# Patient Record
Sex: FEMALE | Race: WHITE | NOT HISPANIC OR LATINO | Employment: FULL TIME | URBAN - METROPOLITAN AREA
[De-identification: names, ages, dates, MRNs, and addresses within clinical notes are randomized per-mention and may not be internally consistent; named-entity substitution may affect disease eponyms.]

---

## 2017-11-06 ENCOUNTER — OFFICE VISIT (OUTPATIENT)
Dept: BARIATRICS/WEIGHT MGMT | Facility: CLINIC | Age: 37
End: 2017-11-06

## 2017-11-06 ENCOUNTER — APPOINTMENT (OUTPATIENT)
Dept: LAB | Facility: HOSPITAL | Age: 37
End: 2017-11-06

## 2017-11-06 VITALS
BODY MASS INDEX: 27.99 KG/M2 | WEIGHT: 168 LBS | HEART RATE: 91 BPM | HEIGHT: 65 IN | SYSTOLIC BLOOD PRESSURE: 131 MMHG | TEMPERATURE: 98.5 F | DIASTOLIC BLOOD PRESSURE: 78 MMHG | RESPIRATION RATE: 16 BRPM

## 2017-11-06 DIAGNOSIS — E66.9 OBESITY, CLASS I, BMI 30-34.9: Primary | ICD-10-CM

## 2017-11-06 DIAGNOSIS — E55.9 VITAMIN D DEFICIENCY: ICD-10-CM

## 2017-11-06 DIAGNOSIS — Z71.3 DIETARY COUNSELING: ICD-10-CM

## 2017-11-06 PROCEDURE — 83013 H PYLORI (C-13) BREATH: CPT | Performed by: NURSE PRACTITIONER

## 2017-11-06 PROCEDURE — 99213 OFFICE O/P EST LOW 20 MIN: CPT | Performed by: NURSE PRACTITIONER

## 2017-11-06 NOTE — PROGRESS NOTES
[facility] BARIATRIC [facility] BARIATRIC SURGERY  [address]  [facility] [address]  [phone]  [address]  [facility] [id number] [phone]  Dept: [phone]  11/6/2017      Christy Castillo.  [id number]  [id number]  1980  female      Chief Complaint   Patient presents with   • Follow-up     1.5 year followup RNMEREDITH RAMIREZ Post-Op Bariatric Surgery:   Christy Castillo is status post Laparoscopic Bypass revision procedure, performed on 5/2/16     HPI:   Today's weight is 168 lb (76.2 kg) pounds, today's BMI is Body mass index is 27.96 kg/(m^2)., she has a  loss of 12 pounds since the last visit and her weight loss since surgery is 83 pounds. The patient reports a decreased portion size and loss of appetite.      Christy Castillo denies nausea, vomiting, dysphagia, abdominal pain or heartburn.     Diet and Exercise: Diet history reviewed and discussed with the patient. Weight loss/gains to date discussed with the patient. The patient states they are eating 60-80 grams of protein per day. She reports eating 3 meals per day, a typical portion size of 1 cup, eating 2 snacks per day, drinking 5 or more 8-oz. glasses of water per day, no carbonated beverage consumption and exercising regularly- yoga once per week    Reports right sided abdominal pain that was waking her from sleep.    Supplements: [employer] PATCH with elemental iron since about 1 mo ago.     Review of Systems   Constitutional: Positive for appetite change. Negative for fatigue and unexpected weight change.   HENT: Negative.    Eyes: Negative.    Respiratory: Negative.    Cardiovascular: Negative.  Negative for leg swelling.   Gastrointestinal: Positive for vomiting (occaionally if she eats too quickly). Negative for abdominal distention, abdominal pain, constipation, diarrhea and nausea.   Genitourinary: Negative for difficulty urinating, frequency and urgency.   Musculoskeletal: Negative for back pain.    Skin: Negative.    Psychiatric/Behavioral: Negative.    All other systems reviewed and are negative.      Patient Active Problem List   Diagnosis   • Dietary counseling   • Edema   • Pain in joint   • Obesity, Class I, BMI 30-34.9   • Vitamin D deficiency   • Pseudotumor cerebri syndrome   • History of hypertension   • Alopecia       Past Medical History:   Diagnosis Date   • Arthritis    • Edema    • Fatigue    • Gastric band slippage     RESOLVED   • Hypertension    • Joint pain    • Joint pain, knee    • Obesity, morbid    • Plantar fasciitis     HISTORY   • PONV (postoperative nausea and vomiting)     SEVERE N/V WITH GAS WITH GASTRIC BANDING   • Pseudotumor cerebri    • Urge and stress incontinence        The following portions of the patient's history were reviewed and updated as appropriate: allergies, current medications, past family history, past medical history, past social history, past surgical history and problem list.    Vitals:    11/06/17 1616   BP: 131/78   Pulse: 91   Resp: 16   Temp: 98.5 °F (36.9 °C)       Physical Exam   Constitutional: She appears well-developed and well-nourished.   Neck: No thyromegaly present.   Cardiovascular: Normal rate, regular rhythm and normal heart sounds.    Pulmonary/Chest: Effort normal and breath sounds normal. No respiratory distress. She has no wheezes.   Abdominal: Soft. Bowel sounds are normal. She exhibits no distension. There is no tenderness. There is no guarding. No hernia.   Musculoskeletal: She exhibits no edema or tenderness.   Neurological: She is alert.   Skin: Skin is warm and dry. No rash noted. No erythema.   Psychiatric: She has a normal mood and affect. Her behavior is normal.   Nursing note and vitals reviewed.        Assessment:   Post-op, the patient is doing well.     Encounter Diagnoses   Name Primary?   • Obesity, Class I, BMI 30-34.9 Yes   • Dietary counseling    • Vitamin D deficiency        Plan:     Encouraged patient to be sure to get  plenty of lean protein per day through small frequent meals all with a protein source.   Activity restrictions: none.   Recommended patient be sure to get at least 70 grams of protein per day by eating small, frequent meals all with high lean protein choices. Be sure to limit/cut back on daily carbohydrate intake. Discussed with the patient the recommended amount of water per day to intake- half of body weight in ounces. Reviewed vitamin requirements. Be sure to do routine exercise, 150 minutes per week minimum, including both cardio and strength training.     Instructions / Recommendations: dietary counseling recommended, recommended a daily protein intake of  grams, vitamin supplement(s) recommended, recommended exercising at least 150 minutes per week, behavior modifications recommended and instructed to call the office for concerns, questions, or problems.     The patient was instructed to follow up in 6 months .     The patient was counseled regarding lab work, follow up, exercise, protein intake, fluid intake. Total time spent face to face was 20 minutes and 15 minutes was spent counseling.

## 2017-11-09 LAB — UREA BREATH TEST QL: NEGATIVE

## 2018-02-07 ENCOUNTER — APPOINTMENT (OUTPATIENT)
Dept: CT IMAGING | Facility: HOSPITAL | Age: 38
End: 2018-02-07

## 2018-02-07 ENCOUNTER — HOSPITAL ENCOUNTER (EMERGENCY)
Facility: HOSPITAL | Age: 38
Discharge: HOME | End: 2018-02-08
Attending: EMERGENCY MEDICINE | Admitting: EMERGENCY MEDICINE

## 2018-02-07 DIAGNOSIS — K80.50 BILIARY COLIC: Primary | ICD-10-CM

## 2018-02-07 LAB
ALBUMIN SERPL-MCNC: 4.1 G/DL (ref 3.5–5.2)
ALBUMIN/GLOB SERPL: 1.4 G/DL
ALP SERPL-CCNC: 65 U/L (ref 39–117)
ALT SERPL W P-5'-P-CCNC: 12 U/L (ref 1–33)
ANION GAP SERPL CALCULATED.3IONS-SCNC: 10 MMOL/L
AST SERPL-CCNC: 16 U/L (ref 1–32)
BACTERIA UR QL AUTO: ABNORMAL /HPF
BASOPHILS # BLD AUTO: 0.02 10*3/MM3 (ref 0–0.2)
BASOPHILS NFR BLD AUTO: 0.2 % (ref 0–1.5)
BILIRUB SERPL-MCNC: 0.2 MG/DL (ref 0.1–1.2)
BILIRUB UR QL STRIP: NEGATIVE
BUN BLD-MCNC: 15 MG/DL (ref 6–20)
BUN/CREAT SERPL: 20.5 (ref 7–25)
CALCIUM SPEC-SCNC: 8.8 MG/DL (ref 8.6–10.5)
CHLORIDE SERPL-SCNC: 104 MMOL/L (ref 98–107)
CLARITY UR: CLEAR
CO2 SERPL-SCNC: 28 MMOL/L (ref 22–29)
COLOR UR: YELLOW
CREAT BLD-MCNC: 0.73 MG/DL (ref 0.57–1)
DEPRECATED RDW RBC AUTO: 60.1 FL (ref 37–54)
EOSINOPHIL # BLD AUTO: 0.11 10*3/MM3 (ref 0–0.7)
EOSINOPHIL NFR BLD AUTO: 1.2 % (ref 0.3–6.2)
ERYTHROCYTE [DISTWIDTH] IN BLOOD BY AUTOMATED COUNT: 17.8 % (ref 11.7–13)
GFR SERPL CREATININE-BSD FRML MDRD: 89 ML/MIN/1.73
GLOBULIN UR ELPH-MCNC: 2.9 GM/DL
GLUCOSE BLD-MCNC: 89 MG/DL (ref 65–99)
GLUCOSE UR STRIP-MCNC: NEGATIVE MG/DL
HCG SERPL QL: NEGATIVE
HCT VFR BLD AUTO: 39.7 % (ref 35.6–45.5)
HGB BLD-MCNC: 12.5 G/DL (ref 11.9–15.5)
HGB UR QL STRIP.AUTO: ABNORMAL
HOLD SPECIMEN: NORMAL
HOLD SPECIMEN: NORMAL
HYALINE CASTS UR QL AUTO: ABNORMAL /LPF
IMM GRANULOCYTES # BLD: 0 10*3/MM3 (ref 0–0.03)
IMM GRANULOCYTES NFR BLD: 0 % (ref 0–0.5)
KETONES UR QL STRIP: NEGATIVE
LEUKOCYTE ESTERASE UR QL STRIP.AUTO: NEGATIVE
LIPASE SERPL-CCNC: 28 U/L (ref 13–60)
LYMPHOCYTES # BLD AUTO: 2.39 10*3/MM3 (ref 0.9–4.8)
LYMPHOCYTES NFR BLD AUTO: 27 % (ref 19.6–45.3)
MCH RBC QN AUTO: 28.3 PG (ref 26.9–32)
MCHC RBC AUTO-ENTMCNC: 31.5 G/DL (ref 32.4–36.3)
MCV RBC AUTO: 89.8 FL (ref 80.5–98.2)
MONOCYTES # BLD AUTO: 0.68 10*3/MM3 (ref 0.2–1.2)
MONOCYTES NFR BLD AUTO: 7.7 % (ref 5–12)
NEUTROPHILS # BLD AUTO: 5.66 10*3/MM3 (ref 1.9–8.1)
NEUTROPHILS NFR BLD AUTO: 63.9 % (ref 42.7–76)
NITRITE UR QL STRIP: NEGATIVE
PH UR STRIP.AUTO: 6 [PH] (ref 5–8)
PLATELET # BLD AUTO: 235 10*3/MM3 (ref 140–500)
PMV BLD AUTO: 9.8 FL (ref 6–12)
POTASSIUM BLD-SCNC: 4.2 MMOL/L (ref 3.5–5.2)
PROT SERPL-MCNC: 7 G/DL (ref 6–8.5)
PROT UR QL STRIP: NEGATIVE
RBC # BLD AUTO: 4.42 10*6/MM3 (ref 3.9–5.2)
RBC # UR: ABNORMAL /HPF
REF LAB TEST METHOD: ABNORMAL
SODIUM BLD-SCNC: 142 MMOL/L (ref 136–145)
SP GR UR STRIP: 1.02 (ref 1–1.03)
SQUAMOUS #/AREA URNS HPF: ABNORMAL /HPF
UROBILINOGEN UR QL STRIP: ABNORMAL
WBC NRBC COR # BLD: 8.86 10*3/MM3 (ref 4.5–10.7)
WBC UR QL AUTO: ABNORMAL /HPF
WHOLE BLOOD HOLD SPECIMEN: NORMAL
WHOLE BLOOD HOLD SPECIMEN: NORMAL

## 2018-02-07 PROCEDURE — 99284 EMERGENCY DEPT VISIT MOD MDM: CPT

## 2018-02-07 PROCEDURE — 80053 COMPREHEN METABOLIC PANEL: CPT | Performed by: EMERGENCY MEDICINE

## 2018-02-07 PROCEDURE — 81003 URINALYSIS AUTO W/O SCOPE: CPT | Performed by: EMERGENCY MEDICINE

## 2018-02-07 PROCEDURE — 36415 COLL VENOUS BLD VENIPUNCTURE: CPT | Performed by: EMERGENCY MEDICINE

## 2018-02-07 PROCEDURE — 84703 CHORIONIC GONADOTROPIN ASSAY: CPT | Performed by: EMERGENCY MEDICINE

## 2018-02-07 PROCEDURE — 83690 ASSAY OF LIPASE: CPT | Performed by: EMERGENCY MEDICINE

## 2018-02-07 PROCEDURE — 81015 MICROSCOPIC EXAM OF URINE: CPT | Performed by: EMERGENCY MEDICINE

## 2018-02-07 PROCEDURE — 85025 COMPLETE CBC W/AUTO DIFF WBC: CPT | Performed by: EMERGENCY MEDICINE

## 2018-02-07 RX ORDER — SODIUM CHLORIDE 0.9 % (FLUSH) 0.9 %
10 SYRINGE (ML) INJECTION AS NEEDED
Status: DISCONTINUED | OUTPATIENT
Start: 2018-02-07 | End: 2018-02-08 | Stop reason: HOSPADM

## 2018-02-08 ENCOUNTER — APPOINTMENT (OUTPATIENT)
Dept: CT IMAGING | Facility: HOSPITAL | Age: 38
End: 2018-02-08

## 2018-02-08 VITALS
WEIGHT: 175 LBS | RESPIRATION RATE: 16 BRPM | DIASTOLIC BLOOD PRESSURE: 81 MMHG | OXYGEN SATURATION: 97 % | TEMPERATURE: 98.4 F | HEART RATE: 53 BPM | SYSTOLIC BLOOD PRESSURE: 117 MMHG | BODY MASS INDEX: 29.16 KG/M2 | HEIGHT: 65 IN

## 2018-02-08 PROCEDURE — 0 IOPAMIDOL 61 % SOLUTION: Performed by: EMERGENCY MEDICINE

## 2018-02-08 PROCEDURE — 74177 CT ABD & PELVIS W/CONTRAST: CPT

## 2018-02-08 RX ORDER — DICYCLOMINE HCL 20 MG
20 TABLET ORAL EVERY 6 HOURS PRN
Qty: 30 TABLET | Refills: 0 | Status: SHIPPED | OUTPATIENT
Start: 2018-02-08 | End: 2022-02-11

## 2018-02-08 RX ORDER — ONDANSETRON 4 MG/1
4 TABLET, ORALLY DISINTEGRATING ORAL EVERY 8 HOURS PRN
Qty: 15 TABLET | Refills: 0 | Status: SHIPPED | OUTPATIENT
Start: 2018-02-08 | End: 2022-02-11

## 2018-02-08 RX ADMIN — IOPAMIDOL 85 ML: 612 INJECTION, SOLUTION INTRAVENOUS at 00:53

## 2018-02-08 NOTE — ED NOTES
Pt c/o RUQ pain. Hx of gastric bypass in 2016 and gall stones. Pain began last night after she ate and again tonight after she ate with vomiting. Denies nausea or diarrhea. Feels better after vomiting.      Cinda Agudelo RN  02/07/18 1953

## 2018-02-08 NOTE — ED PROVIDER NOTES
EMERGENCY DEPARTMENT ENCOUNTER    CHIEF COMPLAINT  Chief Complaint: RUQ abd pain   History given by: Patient  History limited by:  N/A  Room Number: 34/34  PMD: ROYCE Doe      HPI:  Pt is a 37 y.o. female who presents complaining of RUQ pain, which began a couple days ago, but worsened tonight after eating dinner.  Pt also c/o vomiting bile, and constipation, but denies bowel problem or fever.  Pt reports a hx of gastric bypass surgery performed by Dr. Enrique (surgeon) in 2016, losing 100 lbs.     Duration:  Couple days, worsened tonight  Onset: gradual  Timing: waxing and waning, now constant  Location: RUQ abd  Radiation: none  Quality: pain  Intensity/Severity: moderate  Progression: unchanged  Associated Symptoms: vomiting bile, constipation  Aggravating Factors: eating  Alleviating Factors: none  Previous Episodes: none  Treatment before arrival: none    PAST MEDICAL HISTORY  Active Ambulatory Problems     Diagnosis Date Noted   • Dietary counseling 02/15/2016   • Edema 04/14/2016   • Pain in joint 04/14/2016   • Obesity, Class I, BMI 30-34.9 07/27/2016   • Vitamin D deficiency 07/27/2016   • Pseudotumor cerebri syndrome 03/25/2015   • History of hypertension 10/26/2016   • Alopecia 10/26/2016     Resolved Ambulatory Problems     Diagnosis Date Noted   • Morbid obesity with BMI of 40.0-44.9, adult 02/15/2016   • Essential hypertension 02/15/2016   • Fatigue 04/14/2016   • Heartburn 04/14/2016   • Post-op pain 05/05/2016   • Obesity, Class II, BMI 35-39.9 06/09/2016     Past Medical History:   Diagnosis Date   • Arthritis    • Edema    • Fatigue    • Gall stones    • Gastric band slippage    • Hypertension    • Joint pain    • Joint pain, knee    • Obesity, morbid    • Plantar fasciitis    • PONV (postoperative nausea and vomiting)    • Pseudotumor cerebri    • Urge and stress incontinence        PAST SURGICAL HISTORY  Past Surgical History:   Procedure Laterality Date   • COLONOSCOPY       Fiberoptic   • GASTRIC BANDING REMOVAL      Laparosc Restrictive Proc Adjustable Gastric Band Removal   • LAPAROSCOPIC GASTRIC BANDING      Laparosc Restrictive Proc Adjustable Gastric Band Placement   • CA LAP GASTRIC BYPASS/ANCA-EN-Y N/A 5/2/2016    Procedure: REVISION GASTRIC BYPASS LAPAROSCOPIC , PREVIOUS LAP BAND,WITH LYSIS OF ADHESIONS;  Surgeon: Quinn Enrique Jr., MD;  [place] [facility] [place]  Service: [place]       FAMILY HISTORY  Family History   Problem Relation Age of Onset   • Heart disease Other    • Hypertension Other    • Heart attack Other    • Sleep apnea Other    • Obesity Other      Morbid       SOCIAL HISTORY  Social History     Social History   • Marital status:      Spouse name: N/A   • Number of children: N/A   • Years of education: N/A     Occupational History   • Not on file.     Social History Main Topics   • Smoking status: Never Smoker   • Smokeless tobacco: Never Used   • Alcohol use Yes      Comment: Occasional use   • Drug use: No   • Sexual activity: Not on file     Other Topics Concern   • Not on file     Social History Narrative   • No narrative on file       ALLERGIES  Review of patient's allergies indicates no known allergies.    REVIEW OF SYSTEMS  Review of Systems   Constitutional: Negative for fever.   HENT: Negative for sore throat.    Eyes: Negative.    Respiratory: Negative for cough and shortness of breath.    Cardiovascular: Negative for chest pain.   Gastrointestinal: Positive for abdominal pain (RUQ), constipation and vomiting. Negative for diarrhea.   Genitourinary: Negative for dysuria.   Musculoskeletal: Negative for neck pain.   Skin: Negative for rash.   Allergic/Immunologic: Negative.    Neurological: Negative for weakness, numbness and headaches.   Hematological: Negative.    Psychiatric/Behavioral: Negative.    All other systems reviewed and are negative.      PHYSICAL EXAM  ED Triage Vitals   Temp Heart Rate Resp BP SpO2   02/07/18 1943 02/07/18  1943 02/07/18 1943 02/07/18 1953 02/07/18 1943   97.9 °F (36.6 °C) 59 16 158/91 100 %      Temp src Heart Rate Source Patient Position BP Location FiO2 (%)   02/07/18 1943 02/07/18 1943 02/07/18 2314 02/07/18 2314 --   Tympanic Monitor Lying Right arm        Physical Exam   Constitutional: She is oriented to person, place, and time and well-developed, well-nourished, and in no distress. No distress.   HENT:   Head: Normocephalic and atraumatic.   Eyes: EOM are normal. Pupils are equal, round, and reactive to light.   Neck: Normal range of motion. Neck supple.   Cardiovascular: Normal rate, regular rhythm and normal heart sounds.    Pulmonary/Chest: Effort normal and breath sounds normal. No respiratory distress.   Abdominal: Soft. There is no tenderness. There is no rebound and no guarding.   Musculoskeletal: Normal range of motion. She exhibits no edema.   Neurological: She is alert and oriented to person, place, and time. She has normal sensation and normal strength.   Skin: Skin is warm and dry. No rash noted.   Psychiatric: Mood and affect normal.   Nursing note and vitals reviewed.      LAB RESULTS  Lab Results (last 24 hours)     Procedure Component Value Units Date/Time    CBC & Differential [[id number]] Collected:  02/07/18 2001    Specimen:  Blood Updated:  02/07/18 2016    Narrative:       The following orders were created for panel order CBC & Differential.  Procedure                               Abnormality         Status                     ---------                               -----------         ------                     CBC Auto Differential[[id number]]        Abnormal            Final result                 Please view results for these tests on the individual orders.    Comprehensive Metabolic Panel [[id number]] Collected:  02/07/18 2001    Specimen:  Blood Updated:  02/07/18 2037     Glucose 89 mg/dL      BUN 15 mg/dL      Creatinine 0.73 mg/dL      Sodium 142 mmol/L      Potassium 4.2 mmol/L       Chloride 104 mmol/L      CO2 28.0 mmol/L      Calcium 8.8 mg/dL      Total Protein 7.0 g/dL      Albumin 4.10 g/dL      ALT (SGPT) 12 U/L      AST (SGOT) 16 U/L      Alkaline Phosphatase 65 U/L      Total Bilirubin 0.2 mg/dL      eGFR Non African Amer 89 mL/min/1.73      Globulin 2.9 gm/dL      A/G Ratio 1.4 g/dL      BUN/Creatinine Ratio 20.5     Anion Gap 10.0 mmol/L     Lipase [[id number]]  (Normal) Collected:  02/07/18 2001    Specimen:  Blood Updated:  02/07/18 2037     Lipase 28 U/L     hCG, Serum, Qualitative [[id number]]  (Normal) Collected:  02/07/18 2001    Specimen:  Blood Updated:  02/07/18 2046     HCG Qualitative Negative    CBC Auto Differential [[id number]]  (Abnormal) Collected:  02/07/18 2001    Specimen:  Blood Updated:  02/07/18 2016     WBC 8.86 10*3/mm3      RBC 4.42 10*6/mm3      Hemoglobin 12.5 g/dL      Hematocrit 39.7 %      MCV 89.8 fL      MCH 28.3 pg      MCHC 31.5 (L) g/dL      RDW 17.8 (H) %      RDW-SD 60.1 (H) fl      MPV 9.8 fL      Platelets 235 10*3/mm3      Neutrophil % 63.9 %      Lymphocyte % 27.0 %      Monocyte % 7.7 %      Eosinophil % 1.2 %      Basophil % 0.2 %      Immature Grans % 0.0 %      Neutrophils, Absolute 5.66 10*3/mm3      Lymphocytes, Absolute 2.39 10*3/mm3      Monocytes, Absolute 0.68 10*3/mm3      Eosinophils, Absolute 0.11 10*3/mm3      Basophils, Absolute 0.02 10*3/mm3      Immature Grans, Absolute 0.00 10*3/mm3     Urinalysis With / Culture If Indicated - Urine, Clean Catch [[id number]]  (Abnormal) Collected:  02/07/18 2008    Specimen:  Urine from Urine, Clean Catch Updated:  02/07/18 2025     Color, UA Yellow     Appearance, UA Clear     pH, UA 6.0     Specific Gravity, UA 1.020     Glucose, UA Negative     Ketones, UA Negative     Bilirubin, UA Negative     Blood, UA Moderate (2+) (A)     Protein, UA Negative     Leuk Esterase, UA Negative     Nitrite, UA Negative     Urobilinogen, UA 0.2 E.U./dL    Urinalysis, Microscopic Only - Urine, Clean Catch  [[id number]]  (Abnormal) Collected:  02/07/18 2008    Specimen:  Urine from Urine, Clean Catch Updated:  02/07/18 2038     RBC, UA 6-12 (A) /HPF      WBC, UA 0-2 /HPF      Bacteria, UA None Seen /HPF      Squamous Epithelial Cells, UA 0-2 /HPF      Hyaline Casts, UA 0-2 /LPF      Methodology Manual Light Microscopy          I ordered the above labs and reviewed the results    RADIOLOGY  CT Abdomen Pelvis With Contrast   Final Result   IMPRESSION :    1. Minimal diverticulosis, Severe constipation without obstruction.   2. Tiny nonenhancing right renal lesions, likely cysts.           This report was finalized on 2/8/2018 1:02 AM by Darinel Walls MD.          NM HIDA scan with pharmacological intervention    (Results Pending)        I ordered the above noted radiological studies. Interpreted by radiologist. Reviewed by me in PACS.       PROCEDURES  Procedures      PROGRESS AND CONSULTS  ED Course     0146  Discussed normal lab results and CT results showing no presence of obstruction from the gastric bypass surgery.  Discussed plan for discharge to get an outpatient HIDA scan and f/u with Dr. Enrique (surgeon) to further evaluate her gall bladder.  Pt understands and agrees with the plan, all questions answered.      MEDICAL DECISION MAKING  Results were reviewed/discussed with the patient and they were also made aware of online access. Pt also made aware that some labs, such as cultures, will not be resulted during ER visit and follow up with PMD is necessary.     MDM  Number of Diagnoses or Management Options  Biliary colic:      Amount and/or Complexity of Data Reviewed  Clinical lab tests: ordered and reviewed (UA: moderate blood.  Lipase: 28.   CBC: WBC 8.86.)  Tests in the radiology section of CPT®: ordered and reviewed (CT Abd/Pelvis shows minimal diverticulosis, Severe constipation without obstruction.)    Patient Progress  Patient progress: stable         DIAGNOSIS  Final diagnoses:   Biliary colic        DISPOSITION  DISCHARGE    Patient discharged in stable condition.    Reviewed implications of results, diagnosis, meds, responsibility to follow up, warning signs and symptoms of possible worsening, potential complications and reasons to return to ER.    Patient/Family voiced understanding of above instructions.    Discussed plan for discharge, as there is no emergent indication for admission.  Pt/family is agreeable and understands need for follow up and repeat testing.  Pt is aware that discharge does not mean that nothing is wrong but it indicates no emergency is present that requires admission and they must continue care with follow-up as given below or physician of their choice.     FOLLOW-UP  Juju Yuan, APRN  [address]  [place] [address]  [phone]               Medication List      New Prescriptions          dicyclomine 20 MG tablet   Commonly known as:  BENTYL   Take 1 tablet by mouth Every 6 (Six) Hours As Needed (cramping).       ondansetron ODT 4 MG disintegrating tablet   Commonly known as:  ZOFRAN-ODT   Take 1 tablet by mouth Every 8 (Eight) Hours As Needed for Nausea or   Vomiting.               Latest Documented Vital Signs:  As of 2:03 AM  BP- 118/84 HR- 53 Temp- 97.9 °F (36.6 °C) (Tympanic) O2 sat- 99%    --  Documentation assistance provided by deanne Astorga for Dr. Siegel.  Information recorded by the deanne was done at my direction and has been verified and validated by me.     Danae Astorga  02/08/18 0206       Hilton Siegel MD  02/08/18 0212

## 2018-02-09 ENCOUNTER — APPOINTMENT (OUTPATIENT)
Dept: NUCLEAR MEDICINE | Facility: HOSPITAL | Age: 38
End: 2018-02-09
Attending: EMERGENCY MEDICINE

## 2022-02-15 ENCOUNTER — OFFICE VISIT (OUTPATIENT)
Dept: PODIATRY | Facility: CLINIC | Age: 42
End: 2022-02-15

## 2022-02-15 VITALS
WEIGHT: 210 LBS | DIASTOLIC BLOOD PRESSURE: 93 MMHG | HEART RATE: 60 BPM | HEIGHT: 65 IN | BODY MASS INDEX: 34.99 KG/M2 | SYSTOLIC BLOOD PRESSURE: 134 MMHG

## 2022-02-15 DIAGNOSIS — M79.671 RIGHT FOOT PAIN: Primary | ICD-10-CM

## 2022-02-15 DIAGNOSIS — S92.351A CLOSED DISPLACED FRACTURE OF FIFTH METATARSAL BONE OF RIGHT FOOT, INITIAL ENCOUNTER: ICD-10-CM

## 2022-02-15 PROCEDURE — 99204 OFFICE O/P NEW MOD 45 MIN: CPT | Performed by: PODIATRIST

## 2022-02-15 NOTE — PROGRESS NOTES
02/15/2022  Foot and Ankle Surgery - New Patient   Provider: Dr. Juan Nesbitt DPM  Location: [facility]    Subjective:  Christy Castillo is a 41 y.o. female.     Chief Complaint   Patient presents with   • Right Foot - Pain   • Establish Care     No pcp per patient        HPI: Patient is a 41-year-old female that presents after recent injury involving her right foot.  Patient accidentally kicked an object causing pain to the foot.  She reported to the urgent care center after the injury.  Imaging was performed showing fifth metatarsal fracture.  She was placed into a cam boot and referred to me for management.  Today, she continues to have pain and swelling.  She has noted bruising to the plantar aspect of the foot.  She denies any previous injuries.    No Known Allergies    Past Medical History:   Diagnosis Date   • Arthritis    • Edema    • Fatigue    • Gall stones    • Gastric band slippage     RESOLVED   • Hypertension    • Joint pain    • Joint pain, knee    • Obesity, morbid (HCC)    • Plantar fasciitis     HISTORY   • PONV (postoperative nausea and vomiting)     SEVERE N/V WITH GAS WITH GASTRIC BANDING   • Pseudotumor cerebri    • Urge and stress incontinence        Past Surgical History:   Procedure Laterality Date   • COLONOSCOPY      Fiberoptic   • GASTRIC BANDING REMOVAL      Laparosc Restrictive Proc Adjustable Gastric Band Removal   • LAPAROSCOPIC GASTRIC BANDING      Laparosc Restrictive Proc Adjustable Gastric Band Placement   • [place] LAP GASTRIC BYPASS/ANCA-EN-Y N/A 5/2/2016    Procedure: REVISION GASTRIC BYPASS LAPAROSCOPIC , PREVIOUS LAP BAND,WITH LYSIS OF ADHESIONS;  Surgeon: Quinn Enrique Jr., MD;  [place] [facility] [place]  Service: General       Family History   Problem Relation Age of Onset   • Heart disease Other    • Hypertension Other    • Heart attack Other    • Sleep apnea Other    • Obesity Other         Morbid       Social History     Socioeconomic History   • Marital  "status:    Tobacco Use   • Smoking status: Never Smoker   • Smokeless tobacco: Never Used   Vaping Use   • Vaping Use: Never used   Substance and Sexual Activity   • Alcohol use: Yes     Comment: Occasional use   • Drug use: No   • Sexual activity: Defer        Current Outpatient Medications on File Prior to Visit   Medication Sig Dispense Refill   • Multiple Vitamins-Minerals (MULTIVITAMIN ADULT PO) Take  by mouth Daily.     • omeprazole (priLOSEC) 20 MG capsule Take 20 mg by mouth Daily.       No current facility-administered medications on file prior to visit.       Review of Systems:  General: Denies fever, chills, fatigue, and weakness.  Eyes: Denies vision loss, blurry vision, and excessive redness.  ENT: Denies hearing issues and difficulty swallowing.  Cardiovascular: Denies palpitations, chest pain, or syncopal episodes.  Respiratory: Denies shortness of breath, wheezing, and coughing.  GI: Denies abdominal pain, nausea, and vomiting.   : Denies frequency, hematuria, and urgency.  Musculoskeletal: + Right foot pain  Derm: Denies rash, open wounds, or suspicious lesions.  Neuro: Denies headaches, numbness, loss of coordination, and tremors.  Psych: Denies anxiety and depression.  Endocrine: Denies temperature intolerance and changes in appetite.  Heme: Denies bleeding disorders or abnormal bruising.     Objective   /93   Pulse 60   Ht 165.1 cm (65\")   Wt 95.3 kg (210 lb)   BMI 34.95 kg/m²     Foot/Ankle Exam:       General:   Appearance: appears stated age and healthy    Orientation: AAOx3    Affect: appropriate    Gait: antalgic      VASCULAR      Right Foot Vascularity   Normal vascular exam    Dorsalis pedis:  2+  Posterior tibial:  2+  Skin Temperature: warm    Edema Grading:  None  CFT:  < 3 seconds  Pedal Hair Growth:  Present  Varicosities: none        NEUROLOGIC     Right Foot Neurologic   Light touch sensation:  Normal  Hot/Cold sensation: normal    Achilles reflex:  2+     " MUSCULOSKELETAL      Right Foot Musculoskeletal   Ecchymosis:  Plantar fascia and dorsal foot  Tenderness: 5th MTPJ    Arch:  Normal     DERMATOLOGIC     Right Foot Dermatologic   Skin: skin intact        Right Foot Additional Comments Instability, muscle strength, and range of motion testing deferred secondary to guarding.  No ankle or proximal leg pain.  No deformity noted.      Assessment/Plan   Diagnoses and all orders for this visit:    1. Right foot pain (Primary)    2. Closed displaced fracture of fifth metatarsal bone of right foot, initial encounter  -     COVID PRE-OP / PRE-PROCEDURE SCREENING ORDER (NO ISOLATION) - Swab, Nasopharynx; Future  -     CBC (No Diff); Future  -     Basic Metabolic Panel; Future  -     ECG 12 Lead; Future  -     XR Chest 2 View; Future  -     Case Request; Standing  -     Case Request    Other orders  -     Follow Anesthesia Guidelines / Standing Orders; Future  -     Obtain Informed Consent; Future  -     Provide NPO Instructions to Patient; Future  -     Chlorhexidine Skin Prep; Future      Patient presents after recent injury involving her right foot.  Imaging was independently reviewed showing displaced fracture involving the distal shaft of the fifth metatarsal.  I discussed the imaging, diagnosis, and treatment options at length.  We reviewed operative and nonoperative management for this fracture.  I have recommended that we proceed with open reduction internal fixation given the fracture gapping, displacement, and patient's activity level.  I do feel that surgery will allow for more predictable recovery.  We did discuss procedure, risk, goals, and recovery at length.  Patient understands and agrees.  We will plan for the near future.  I have asked that she remain mostly off weightbearing at this time.  She is to call with any additional issues or concerns.  Greater than 45 minutes was spent before, during, and after evaluation for patient care.    Orders Placed This  Encounter   Procedures   • COVID PRE-OP / PRE-PROCEDURE SCREENING ORDER (NO ISOLATION) - Swab, Nasopharynx     Standing Status:   Future     Standing Expiration Date:   2/15/2023     Order Specific Question:   Please select your location:     Answer:    [place]     Order Specific Question:   COVID Screening Order:     Answer:   In-House: EMERGENT/PREOP-CEPHEID, 3-4 HR TAT [[id number]]     Order Specific Question:   Employed in healthcare setting?     Answer:   No     Order Specific Question:   Symptomatic for COVID-19 as defined by CDC?     Answer:   No     Order Specific Question:   Hospitalized for COVID-19?     Answer:   No     Order Specific Question:   Admitted to ICU for COVID-19?     Answer:   No     Order Specific Question:   Resident in a congregate (group) care setting?     Answer:   No     Order Specific Question:   Pregnant?     Answer:   Unknown     Order Specific Question:   Release to patient     Answer:   Immediate   • XR Chest 2 View     Standing Status:   Future     Standing Expiration Date:   2/15/2023     Order Specific Question:   Reason for Exam:     Answer:   Preop     Order Specific Question:   Patient Pregnant     Answer:   Unknown   • CBC (No Diff)     Standing Status:   Future     Standing Expiration Date:   2/15/2023     Order Specific Question:   Release to patient     Answer:   Immediate   • Basic Metabolic Panel     Standing Status:   Future     Standing Expiration Date:   2/15/2023     Order Specific Question:   Release to patient     Answer:   Immediate   • Follow Anesthesia Guidelines / Standing Orders     Standing Status:   Future   • Obtain Informed Consent     Standing Status:   Future     Order Specific Question:   Informed Consent Given For     Answer:   Open reduction internal fixation of right fifth metatarsal fracture.   • Provide NPO Instructions to Patient     Standing Status:   Future   • Chlorhexidine Skin Prep     Chlorhexidine Skin Prep and Instructions For All Patients  Having A Procedure Requiring an Outward Incision if Not Allergic. If Allergic, Give Antibacterial Skin Wipes and Instructions. Do Not Use For Facial Cases or on Any Mucus Membranes.     Standing Status:   Future   • ECG 12 Lead     Standing Status:   Future     Standing Expiration Date:   2/15/2023     Order Specific Question:   Reason for Exam:     Answer:   Preop        Note is dictated utilizing voice recognition software. Unfortunately this leads to occasional typographical errors. I apologize in advance if the situation occurs. If questions occur please do not hesitate to call our office.

## 2022-02-17 PROBLEM — S92.351A CLOSED DISPLACED FRACTURE OF FIFTH METATARSAL BONE OF RIGHT FOOT: Status: ACTIVE | Noted: 2022-02-17

## 2022-02-18 ENCOUNTER — PATIENT ROUNDING (BHMG ONLY) (OUTPATIENT)
Dept: PODIATRY | Facility: CLINIC | Age: 42
End: 2022-02-18

## 2022-02-18 NOTE — PROGRESS NOTES
A my chart message has been sent to the patient for [place] [facility] [place]

## 2022-02-21 ENCOUNTER — HOSPITAL ENCOUNTER (OUTPATIENT)
Dept: GENERAL RADIOLOGY | Facility: HOSPITAL | Age: 42
Discharge: HOME | End: 2022-02-21

## 2022-02-21 ENCOUNTER — LAB (OUTPATIENT)
Dept: LAB | Facility: HOSPITAL | Age: 42
End: 2022-02-21

## 2022-02-21 ENCOUNTER — HOSPITAL ENCOUNTER (OUTPATIENT)
Dept: CARDIOLOGY | Facility: HOSPITAL | Age: 42
Discharge: HOME | End: 2022-02-21

## 2022-02-21 DIAGNOSIS — S92.351A CLOSED DISPLACED FRACTURE OF FIFTH METATARSAL BONE OF RIGHT FOOT, INITIAL ENCOUNTER: ICD-10-CM

## 2022-02-21 LAB
ANION GAP SERPL CALCULATED.3IONS-SCNC: 10.3 MMOL/L (ref 5–15)
BUN SERPL-MCNC: 9 MG/DL (ref 6–20)
BUN/CREAT SERPL: 13.4 (ref 7–25)
CALCIUM SPEC-SCNC: 9.2 MG/DL (ref 8.6–10.5)
CHLORIDE SERPL-SCNC: 100 MMOL/L (ref 98–107)
CO2 SERPL-SCNC: 27.7 MMOL/L (ref 22–29)
CREAT SERPL-MCNC: 0.67 MG/DL (ref 0.57–1)
DEPRECATED RDW RBC AUTO: 42.2 FL (ref 37–54)
ERYTHROCYTE [DISTWIDTH] IN BLOOD BY AUTOMATED COUNT: 15.4 % (ref 12.3–15.4)
GFR SERPL CREATININE-BSD FRML MDRD: 96 ML/MIN/1.73
GLUCOSE SERPL-MCNC: 82 MG/DL (ref 65–99)
HCT VFR BLD AUTO: 32.3 % (ref 34–46.6)
HGB BLD-MCNC: 9.5 G/DL (ref 12–15.9)
MCH RBC QN AUTO: 22.9 PG (ref 26.6–33)
MCHC RBC AUTO-ENTMCNC: 29.4 G/DL (ref 31.5–35.7)
MCV RBC AUTO: 77.8 FL (ref 79–97)
MRSA DNA SPEC QL NAA+PROBE: NORMAL
PLATELET # BLD AUTO: 372 10*3/MM3 (ref 140–450)
PMV BLD AUTO: 9.8 FL (ref 6–12)
POTASSIUM SERPL-SCNC: 4 MMOL/L (ref 3.5–5.2)
QT INTERVAL: 433 MS
RBC # BLD AUTO: 4.15 10*6/MM3 (ref 3.77–5.28)
SODIUM SERPL-SCNC: 138 MMOL/L (ref 136–145)
WBC NRBC COR # BLD: 8.19 10*3/MM3 (ref 3.4–10.8)

## 2022-02-21 PROCEDURE — 71046 X-RAY EXAM CHEST 2 VIEWS: CPT

## 2022-02-21 PROCEDURE — 93005 ELECTROCARDIOGRAM TRACING: CPT | Performed by: PODIATRIST

## 2022-02-21 PROCEDURE — 36415 COLL VENOUS BLD VENIPUNCTURE: CPT

## 2022-02-21 PROCEDURE — 87641 MR-STAPH DNA AMP PROBE: CPT

## 2022-02-21 PROCEDURE — 80048 BASIC METABOLIC PNL TOTAL CA: CPT

## 2022-02-21 PROCEDURE — 85027 COMPLETE CBC AUTOMATED: CPT

## 2022-02-21 PROCEDURE — 93010 ELECTROCARDIOGRAM REPORT: CPT | Performed by: INTERNAL MEDICINE

## 2022-03-14 ENCOUNTER — OFFICE VISIT (OUTPATIENT)
Dept: PODIATRY | Facility: CLINIC | Age: 42
End: 2022-03-14

## 2022-03-14 VITALS
WEIGHT: 210 LBS | SYSTOLIC BLOOD PRESSURE: 131 MMHG | BODY MASS INDEX: 34.99 KG/M2 | HEIGHT: 65 IN | DIASTOLIC BLOOD PRESSURE: 84 MMHG | HEART RATE: 52 BPM

## 2022-03-14 DIAGNOSIS — S92.351D CLOSED DISPLACED FRACTURE OF FIFTH METATARSAL BONE OF RIGHT FOOT WITH ROUTINE HEALING, SUBSEQUENT ENCOUNTER: ICD-10-CM

## 2022-03-14 DIAGNOSIS — M79.671 RIGHT FOOT PAIN: Primary | ICD-10-CM

## 2022-03-14 PROCEDURE — 99213 OFFICE O/P EST LOW 20 MIN: CPT | Performed by: PODIATRIST

## 2022-03-15 NOTE — PROGRESS NOTES
"03/14/2022  Foot and Ankle Surgery - Established Patient/Follow-up  Provider: Dr. Juan Nesbitt DPM  Location: [facility]    Subjective:  Christy Castillo is a 41 y.o. female.     Chief Complaint   Patient presents with   • Right Foot - Pain   • Follow-up     No pcp per patient        HPI: Patient returns approximately 4 weeks after injury for evaluation of her right fifth metatarsal fracture.  Patient states that she is doing well.  She has remained in the cam boot with decreased overall activity.  She did perform preoperative testing, but states that her hemoglobin was 9.5 at that time.  She has noticed less pain to the foot and no other issues today.    Allergies   Allergen Reactions   • Other Nausea And Vomiting     Anesthesia gas     Only gas-- not iv       Current Outpatient Medications on File Prior to Visit   Medication Sig Dispense Refill   • Multiple Vitamins-Minerals (MULTIVITAMIN ADULT PO) Take  by mouth Daily.     • omeprazole (priLOSEC) 20 MG capsule Take 20 mg by mouth Daily.       No current facility-administered medications on file prior to visit.       Objective   /84   Pulse 52   Ht 165.1 cm (65\")   Wt 95.3 kg (210 lb)   BMI 34.95 kg/m²     General:   Appearance: appears stated age and healthy    Orientation: AAOx3    Affect: appropriate    Gait: antalgic       VASCULAR       Right Foot Vascularity   Normal vascular exam    Dorsalis pedis:  2+  Posterior tibial:  2+  Skin Temperature: warm    Edema Grading:  None  CFT:  < 3 seconds  Pedal Hair Growth:  Present  Varicosities: none        NEUROLOGIC      Right Foot Neurologic   Light touch sensation:  Normal  Hot/Cold sensation: normal    Achilles reflex:  2+      MUSCULOSKELETAL       Right Foot Musculoskeletal   Ecchymosis:  Plantar fascia and dorsal foot  Tenderness: 5th MTPJ    Arch:  Normal      DERMATOLOGIC      Right Foot Dermatologic   Skin: skin intact       No progressive deformity.  Mild discomfort with palpation to " the lateral column of the foot    Assessment/Plan   Diagnoses and all orders for this visit:    1. Right foot pain (Primary)  -     XR Foot 3+ View Right    2. Closed displaced fracture of fifth metatarsal bone of right foot with routine healing, subsequent encounter      Imaging was performed and independently reviewed showing no progressive displacement.  Minimal interval signs of healing are noted to the distal aspect of the fracture.  Given that we are more than 4 weeks out from surgery, I do feel that proceeding with conservative care is the most appropriate option.  I have asked that she remain in the cam boot with decreased overall activity.  She is to continue range of motion manual therapy exercises.  She is to avoid high-impact and excessive activity.  I would like to see her in 4 weeks for reevaluation and imaging.  Greater than 20 minutes was spent before, during, and after evaluation for patient care.    Orders Placed This Encounter   Procedures   • XR Foot 3+ View Right     Order Specific Question:   Reason for Exam:     Answer:   right 5th  metararsal pain. room 14 wb     Order Specific Question:   Patient Pregnant     Answer:   No     Order Specific Question:   Does this patient have a diabetic monitoring/medication delivering device on?     Answer:   No     Order Specific Question:   Release to patient     Answer:   Immediate          Note is dictated utilizing voice recognition software. Unfortunately this leads to occasional typographical errors. I apologize in advance if the situation occurs. If questions occur please do not hesitate to call our office.

## 2022-04-07 ENCOUNTER — OFFICE VISIT (OUTPATIENT)
Dept: FAMILY MEDICINE CLINIC | Facility: CLINIC | Age: 42
End: 2022-04-07

## 2022-04-07 VITALS
HEART RATE: 64 BPM | RESPIRATION RATE: 18 BRPM | OXYGEN SATURATION: 100 % | BODY MASS INDEX: 35.35 KG/M2 | HEIGHT: 65 IN | WEIGHT: 212.2 LBS | SYSTOLIC BLOOD PRESSURE: 140 MMHG | DIASTOLIC BLOOD PRESSURE: 95 MMHG

## 2022-04-07 DIAGNOSIS — D50.8 OTHER IRON DEFICIENCY ANEMIA: ICD-10-CM

## 2022-04-07 DIAGNOSIS — E55.9 VITAMIN D DEFICIENCY: ICD-10-CM

## 2022-04-07 DIAGNOSIS — Z13.29 SCREENING FOR HYPOTHYROIDISM: ICD-10-CM

## 2022-04-07 DIAGNOSIS — Z00.00 PREVENTATIVE HEALTH CARE: ICD-10-CM

## 2022-04-07 DIAGNOSIS — Z13.1 SCREENING FOR DIABETES MELLITUS: ICD-10-CM

## 2022-04-07 DIAGNOSIS — E53.8 VITAMIN B12 DEFICIENCY: ICD-10-CM

## 2022-04-07 DIAGNOSIS — Z12.31 SCREENING MAMMOGRAM FOR BREAST CANCER: Primary | ICD-10-CM

## 2022-04-07 LAB
25(OH)D3 SERPL-MCNC: 25.3 NG/ML (ref 30–100)
ALBUMIN SERPL-MCNC: 4 G/DL (ref 3.5–5.2)
ALBUMIN/GLOB SERPL: 1.3 G/DL
ALP SERPL-CCNC: 82 U/L (ref 39–117)
ALT SERPL W P-5'-P-CCNC: 11 U/L (ref 1–33)
ANION GAP SERPL CALCULATED.3IONS-SCNC: 13.5 MMOL/L (ref 5–15)
AST SERPL-CCNC: 17 U/L (ref 1–32)
BASOPHILS # BLD AUTO: 0.04 10*3/MM3 (ref 0–0.2)
BASOPHILS NFR BLD AUTO: 0.5 % (ref 0–1.5)
BILIRUB SERPL-MCNC: 0.3 MG/DL (ref 0–1.2)
BUN SERPL-MCNC: 7 MG/DL (ref 6–20)
BUN/CREAT SERPL: 12.3 (ref 7–25)
CALCIUM SPEC-SCNC: 9.1 MG/DL (ref 8.6–10.5)
CHLORIDE SERPL-SCNC: 102 MMOL/L (ref 98–107)
CHOLEST SERPL-MCNC: 153 MG/DL (ref 0–200)
CO2 SERPL-SCNC: 24.5 MMOL/L (ref 22–29)
CREAT SERPL-MCNC: 0.57 MG/DL (ref 0.57–1)
DEPRECATED RDW RBC AUTO: 43.4 FL (ref 37–54)
EGFRCR SERPLBLD CKD-EPI 2021: 115.8 ML/MIN/1.73
EOSINOPHIL # BLD AUTO: 0.11 10*3/MM3 (ref 0–0.4)
EOSINOPHIL NFR BLD AUTO: 1.5 % (ref 0.3–6.2)
ERYTHROCYTE [DISTWIDTH] IN BLOOD BY AUTOMATED COUNT: 16 % (ref 12.3–15.4)
FERRITIN SERPL-MCNC: 6.31 NG/ML (ref 13–150)
GLOBULIN UR ELPH-MCNC: 3 GM/DL
GLUCOSE SERPL-MCNC: 78 MG/DL (ref 65–99)
HBA1C MFR BLD: 5.5 % (ref 3.5–5.6)
HCT VFR BLD AUTO: 34 % (ref 34–46.6)
HDLC SERPL-MCNC: 39 MG/DL (ref 40–60)
HGB BLD-MCNC: 10.1 G/DL (ref 12–15.9)
IMM GRANULOCYTES # BLD AUTO: 0.01 10*3/MM3 (ref 0–0.05)
IMM GRANULOCYTES NFR BLD AUTO: 0.1 % (ref 0–0.5)
IRON 24H UR-MRATE: 27 MCG/DL (ref 37–145)
IRON SATN MFR SERPL: 5 % (ref 20–50)
LDLC SERPL CALC-MCNC: 96 MG/DL (ref 0–100)
LDLC/HDLC SERPL: 2.44 {RATIO}
LYMPHOCYTES # BLD AUTO: 1.63 10*3/MM3 (ref 0.7–3.1)
LYMPHOCYTES NFR BLD AUTO: 21.8 % (ref 19.6–45.3)
MCH RBC QN AUTO: 22.6 PG (ref 26.6–33)
MCHC RBC AUTO-ENTMCNC: 29.7 G/DL (ref 31.5–35.7)
MCV RBC AUTO: 76.2 FL (ref 79–97)
MONOCYTES # BLD AUTO: 0.63 10*3/MM3 (ref 0.1–0.9)
MONOCYTES NFR BLD AUTO: 8.4 % (ref 5–12)
NEUTROPHILS NFR BLD AUTO: 5.06 10*3/MM3 (ref 1.7–7)
NEUTROPHILS NFR BLD AUTO: 67.7 % (ref 42.7–76)
NRBC BLD AUTO-RTO: 0 /100 WBC (ref 0–0.2)
PLATELET # BLD AUTO: 379 10*3/MM3 (ref 140–450)
PMV BLD AUTO: 10.1 FL (ref 6–12)
POTASSIUM SERPL-SCNC: 4 MMOL/L (ref 3.5–5.2)
PROT SERPL-MCNC: 7 G/DL (ref 6–8.5)
RBC # BLD AUTO: 4.46 10*6/MM3 (ref 3.77–5.28)
SODIUM SERPL-SCNC: 140 MMOL/L (ref 136–145)
T4 FREE SERPL-MCNC: 1.03 NG/DL (ref 0.93–1.7)
TIBC SERPL-MCNC: 550 MCG/DL (ref 298–536)
TRANSFERRIN SERPL-MCNC: 369 MG/DL (ref 200–360)
TRIGL SERPL-MCNC: 95 MG/DL (ref 0–150)
TSH SERPL DL<=0.05 MIU/L-ACNC: 1.01 UIU/ML (ref 0.27–4.2)
VIT B12 BLD-MCNC: 336 PG/ML (ref 211–946)
VLDLC SERPL-MCNC: 18 MG/DL (ref 5–40)
WBC NRBC COR # BLD: 7.48 10*3/MM3 (ref 3.4–10.8)

## 2022-04-07 PROCEDURE — 82607 VITAMIN B-12: CPT | Performed by: NURSE PRACTITIONER

## 2022-04-07 PROCEDURE — 80061 LIPID PANEL: CPT | Performed by: NURSE PRACTITIONER

## 2022-04-07 PROCEDURE — 99203 OFFICE O/P NEW LOW 30 MIN: CPT | Performed by: NURSE PRACTITIONER

## 2022-04-07 PROCEDURE — 83540 ASSAY OF IRON: CPT | Performed by: NURSE PRACTITIONER

## 2022-04-07 PROCEDURE — 82306 VITAMIN D 25 HYDROXY: CPT | Performed by: NURSE PRACTITIONER

## 2022-04-07 PROCEDURE — 82728 ASSAY OF FERRITIN: CPT | Performed by: NURSE PRACTITIONER

## 2022-04-07 PROCEDURE — 84466 ASSAY OF TRANSFERRIN: CPT | Performed by: NURSE PRACTITIONER

## 2022-04-07 PROCEDURE — 83036 HEMOGLOBIN GLYCOSYLATED A1C: CPT | Performed by: NURSE PRACTITIONER

## 2022-04-07 PROCEDURE — 84439 ASSAY OF FREE THYROXINE: CPT | Performed by: NURSE PRACTITIONER

## 2022-04-07 PROCEDURE — 80050 GENERAL HEALTH PANEL: CPT | Performed by: NURSE PRACTITIONER

## 2022-04-12 ENCOUNTER — OFFICE VISIT (OUTPATIENT)
Dept: PODIATRY | Facility: CLINIC | Age: 42
End: 2022-04-12

## 2022-04-12 VITALS
HEIGHT: 65 IN | HEART RATE: 89 BPM | DIASTOLIC BLOOD PRESSURE: 87 MMHG | SYSTOLIC BLOOD PRESSURE: 153 MMHG | BODY MASS INDEX: 35.32 KG/M2 | WEIGHT: 212 LBS

## 2022-04-12 DIAGNOSIS — M79.671 RIGHT FOOT PAIN: Primary | ICD-10-CM

## 2022-04-12 DIAGNOSIS — S92.351D CLOSED DISPLACED FRACTURE OF FIFTH METATARSAL BONE OF RIGHT FOOT WITH ROUTINE HEALING, SUBSEQUENT ENCOUNTER: ICD-10-CM

## 2022-04-12 PROCEDURE — 99213 OFFICE O/P EST LOW 20 MIN: CPT | Performed by: PODIATRIST

## 2022-04-12 NOTE — PROGRESS NOTES
"04/12/2022  Foot and Ankle Surgery - Established Patient/Follow-up  Provider: Dr. Juan Nesbitt DPM  Location: [facility]    Subjective:  Christy Castillo is a 41 y.o. female.     Chief Complaint   Patient presents with   • Right Foot - Pain   • Follow-up     Last pcp appt with amilcar mojica  4/7/2022       HPI: Patient returns for follow-up regarding her right fifth metatarsal fracture.  She states that she has been wearing the cam boot and is doing quite well.  She is very eager to return to her regular shoe and normal activity.    Allergies   Allergen Reactions   • Other Nausea And Vomiting     Anesthesia gas     Only gas-- not iv       Current Outpatient Medications on File Prior to Visit   Medication Sig Dispense Refill   • Multiple Vitamins-Minerals (MULTIVITAMIN ADULT PO) Take  by mouth Daily.     • omeprazole (priLOSEC) 20 MG capsule Take 20 mg by mouth Daily.       No current facility-administered medications on file prior to visit.       Objective   /87   Pulse 89   Ht 165.1 cm (65\")   Wt 96.2 kg (212 lb)   BMI 35.28 kg/m²     General:   Appearance: appears stated age and healthy    Orientation: AAOx3    Affect: appropriate    Gait: antalgic       VASCULAR       Right Foot Vascularity   Normal vascular exam    Dorsalis pedis:  2+  Posterior tibial:  2+  Skin Temperature: warm    Edema Grading:  None  CFT:  < 3 seconds  Pedal Hair Growth:  Present  Varicosities: none        NEUROLOGIC      Right Foot Neurologic   Light touch sensation:  Normal  Hot/Cold sensation: normal    Achilles reflex:  2+      MUSCULOSKELETAL       Right Foot Musculoskeletal   Ecchymosis: Resolved  Tenderness: Mostly resolved  Arch:  Normal      DERMATOLOGIC      Right Foot Dermatologic   Skin: skin intact       No significant pain with palpation    Assessment/Plan   Diagnoses and all orders for this visit:    1. Right foot pain (Primary)    2. Closed displaced fracture of fifth metatarsal bone of right foot " with routine healing, subsequent encounter  -     XR Foot 3+ View Right      Patient is doing quite well at this time.  Imaging was reviewed showing no significant changes and mild interval signs of healing.  She is doing much better.  I do feel that she can discontinue the cam boot and return to her regular shoe with baseline activity.  She is to call with any progressive issues or concerns.  I have recommended that she return in 6 weeks for reevaluation and imaging.  Greater than 20 minutes was spent before, during, and after evaluation for patient care.    Orders Placed This Encounter   Procedures   • XR Foot 3+ View Right     Order Specific Question:   Reason for Exam:     Answer:   closed displaced fx 5th metatarsal bone right foot   room 15 wb     Order Specific Question:   Patient Pregnant     Answer:   No     Order Specific Question:   Does this patient have a diabetic monitoring/medication delivering device on?     Answer:   No     Order Specific Question:   Release to patient     Answer:   Immediate          Note is dictated utilizing voice recognition software. Unfortunately this leads to occasional typographical errors. I apologize in advance if the situation occurs. If questions occur please do not hesitate to call our office.

## 2022-04-20 ENCOUNTER — APPOINTMENT (OUTPATIENT)
Dept: MAMMOGRAPHY | Facility: HOSPITAL | Age: 42
End: 2022-04-20

## 2022-04-20 ENCOUNTER — HOSPITAL ENCOUNTER (OUTPATIENT)
Dept: MAMMOGRAPHY | Facility: HOSPITAL | Age: 42
Discharge: HOME | End: 2022-04-20
Admitting: NURSE PRACTITIONER

## 2022-04-20 DIAGNOSIS — Z12.31 SCREENING MAMMOGRAM FOR BREAST CANCER: ICD-10-CM

## 2022-04-20 PROCEDURE — 77063 BREAST TOMOSYNTHESIS BI: CPT

## 2022-04-20 PROCEDURE — 77067 SCR MAMMO BI INCL CAD: CPT

## 2022-05-05 ENCOUNTER — OFFICE VISIT (OUTPATIENT)
Dept: FAMILY MEDICINE CLINIC | Facility: CLINIC | Age: 42
End: 2022-05-05

## 2022-05-05 VITALS
TEMPERATURE: 98 F | SYSTOLIC BLOOD PRESSURE: 132 MMHG | HEART RATE: 74 BPM | OXYGEN SATURATION: 100 % | BODY MASS INDEX: 35.32 KG/M2 | WEIGHT: 212 LBS | RESPIRATION RATE: 18 BRPM | DIASTOLIC BLOOD PRESSURE: 92 MMHG | HEIGHT: 65 IN

## 2022-05-05 DIAGNOSIS — Z12.31 SCREENING MAMMOGRAM FOR BREAST CANCER: ICD-10-CM

## 2022-05-05 DIAGNOSIS — B37.9 YEAST INFECTION: ICD-10-CM

## 2022-05-05 DIAGNOSIS — D50.8 OTHER IRON DEFICIENCY ANEMIA: Primary | ICD-10-CM

## 2022-05-05 PROCEDURE — 99213 OFFICE O/P EST LOW 20 MIN: CPT | Performed by: NURSE PRACTITIONER

## 2022-05-05 RX ORDER — FLUCONAZOLE 150 MG/1
150 TABLET ORAL ONCE
Qty: 2 TABLET | Refills: 0 | Status: SHIPPED | OUTPATIENT
Start: 2022-05-05 | End: 2022-05-05

## 2022-05-05 NOTE — PROGRESS NOTES
"Chief Complaint  Follow-up (Labs and Mammo )    Subjective          Christy Castillo presents to [facility]  HTN monitor pressures for consistent elevations  Anemia r/t Gastric bypass has seen hematology  Is a nurse works at [employer]  Here to f/u for mammogram and labs reviewed in office  Have yeast infection symptoms will treat        Objective   Vital Signs:   /92   Pulse 74   Temp 98 °F (36.7 °C)   Resp 18   Ht 165.1 cm (65\")   Wt 96.2 kg (212 lb)   SpO2 100%   BMI 35.28 kg/m²     Physical Exam  Vitals and nursing note reviewed.   Constitutional:       Appearance: Normal appearance. She is well-developed.   HENT:      Head: Normocephalic and atraumatic.   Eyes:      Conjunctiva/sclera: Conjunctivae normal.      Pupils: Pupils are equal, round, and reactive to light.   Cardiovascular:      Rate and Rhythm: Normal rate and regular rhythm.      Heart sounds: Normal heart sounds.   Pulmonary:      Effort: Pulmonary effort is normal.      Breath sounds: Normal breath sounds.   Abdominal:      General: Bowel sounds are normal.      Palpations: Abdomen is soft.   Musculoskeletal:         General: Normal range of motion.      Cervical back: Normal range of motion and neck supple.   Skin:     General: Skin is warm and dry.   Neurological:      Mental Status: She is alert and oriented to person, place, and time.   Psychiatric:         Behavior: Behavior normal.         Thought Content: Thought content normal.         Judgment: Judgment normal.        Result Review :     MAMMO SCREENING DIGITAL TOMOSYNTHESIS BILATERAL W CAD-     Date of Exam: 4/20/2022 8:55 AM     Indication: Screening.  History of benign biopsy right breast  calcifications at [facility].     Comparison: 11/01/2016, 11/04/2016 at [facility].     Technique: MLO and CC views of bilateral breast(s) were obtained  according to routine screening breast mammography protocol with  tomosynthesis.       The " mammographic images were interpreted with the assistance of a  computer aided detection system.      FINDINGS:  There are scattered areas of fibroglandular density.      Benign-appearing coarse calcifications in the right breast. There are no  suspicious masses, suspicious microcalcifications, or architectural  distortion in either breast.      IMPRESSION:  No mammographic signs of malignancy. Recommend routine mammographic  screening.     BI-RADS ASSESSMENT: BI-RADS 2. Benign findings.     The patient's information is entered into a computerized reminder system  with a targeted due date for the next mammogram.     Note:  It has been reported that there is approximately a 15% false  negative in mammography.  Therefore, management of a palpable  abnormality should not be deferred because of a negative mammogram.           Electronically Signed By-Dara Schmitt MD On:4/20/2022 10:12 AM  This report was finalized on [id number] by  Dara Schmitt MD            Assessment and Plan    Diagnoses and all orders for this visit:    1. Other iron deficiency anemia (Primary)  -     Ambulatory Referral to Hematology    Other orders  -     fluconazole (Diflucan) 150 MG tablet; Take 1 tablet by mouth 1 (One) Time for 1 dose. May repeat in 72 hours if not effective  Dispense: 2 tablet; Refill: 0               Follow Up   Return in about 3 months (around 8/5/2022) for Recheck.  Patient was given instructions and counseling regarding her condition or for health maintenance advice. Please see specific information pulled into the AVS if appropriate.

## 2022-05-13 ENCOUNTER — TELEPHONE (OUTPATIENT)
Dept: FAMILY MEDICINE CLINIC | Facility: CLINIC | Age: 42
End: 2022-05-13

## 2022-05-26 ENCOUNTER — CONSULT (OUTPATIENT)
Dept: ONCOLOGY | Facility: CLINIC | Age: 42
End: 2022-05-26

## 2022-05-26 VITALS
TEMPERATURE: 98.8 F | HEIGHT: 65 IN | RESPIRATION RATE: 18 BRPM | HEART RATE: 68 BPM | WEIGHT: 207 LBS | DIASTOLIC BLOOD PRESSURE: 84 MMHG | OXYGEN SATURATION: 98 % | BODY MASS INDEX: 34.49 KG/M2 | SYSTOLIC BLOOD PRESSURE: 145 MMHG

## 2022-05-26 DIAGNOSIS — D50.9 IRON DEFICIENCY ANEMIA, UNSPECIFIED IRON DEFICIENCY ANEMIA TYPE: Primary | ICD-10-CM

## 2022-05-26 PROCEDURE — 99204 OFFICE O/P NEW MOD 45 MIN: CPT | Performed by: INTERNAL MEDICINE

## 2022-06-06 RX ORDER — ERGOCALCIFEROL 1.25 MG/1
50000 CAPSULE ORAL
Qty: 8 CAPSULE | Refills: 0 | Status: SHIPPED | OUTPATIENT
Start: 2022-06-06 | End: 2022-07-26

## 2022-06-07 PROBLEM — D50.9 IDA (IRON DEFICIENCY ANEMIA): Status: ACTIVE | Noted: 2022-06-07

## 2022-06-07 PROBLEM — K90.9 MALABSORPTION OF IRON: Status: ACTIVE | Noted: 2022-06-07

## 2022-06-15 DIAGNOSIS — D50.9 IRON DEFICIENCY ANEMIA, UNSPECIFIED IRON DEFICIENCY ANEMIA TYPE: Primary | ICD-10-CM

## 2022-06-15 NOTE — PROGRESS NOTES
Received a message from Juju saying that the pt needs updated lab work to move forward on [employer] auth. Per Dr. Lipscomb orders fro new labs have been placed. Attempted to call the pt to make her aware and make an apt for her. No answer. V/m was left with call back information.

## 2022-06-20 ENCOUNTER — TELEPHONE (OUTPATIENT)
Dept: ONCOLOGY | Facility: CLINIC | Age: 42
End: 2022-06-20

## 2022-06-20 NOTE — TELEPHONE ENCOUNTER
Called pt to make a lab apt to get updated iron levels for insurance to approve her IV iron. Pt is scheduled to come in for labs on 6/21. Pt follow up apt moved from 6/28 to 8/1 due to scheduling conflicts.

## 2022-06-21 ENCOUNTER — LAB (OUTPATIENT)
Dept: LAB | Facility: HOSPITAL | Age: 42
End: 2022-06-21

## 2022-06-21 DIAGNOSIS — D50.9 IRON DEFICIENCY ANEMIA, UNSPECIFIED IRON DEFICIENCY ANEMIA TYPE: ICD-10-CM

## 2022-06-21 LAB
BASOPHILS # BLD AUTO: 0.05 10*3/MM3 (ref 0–0.2)
BASOPHILS NFR BLD AUTO: 0.7 % (ref 0–1.5)
DEPRECATED RDW RBC AUTO: 46 FL (ref 37–54)
EOSINOPHIL # BLD AUTO: 0.16 10*3/MM3 (ref 0–0.4)
EOSINOPHIL NFR BLD AUTO: 2.3 % (ref 0.3–6.2)
ERYTHROCYTE [DISTWIDTH] IN BLOOD BY AUTOMATED COUNT: 18.1 % (ref 12.3–15.4)
FERRITIN SERPL-MCNC: 13.16 NG/ML (ref 13–150)
HCT VFR BLD AUTO: 31.2 % (ref 34–46.6)
HGB BLD-MCNC: 9.1 G/DL (ref 12–15.9)
IRON 24H UR-MRATE: 24 MCG/DL (ref 37–145)
IRON SATN MFR SERPL: 4 % (ref 20–50)
LYMPHOCYTES # BLD AUTO: 1.65 10*3/MM3 (ref 0.7–3.1)
LYMPHOCYTES NFR BLD AUTO: 24.1 % (ref 19.6–45.3)
MCH RBC QN AUTO: 21.3 PG (ref 26.6–33)
MCHC RBC AUTO-ENTMCNC: 29.2 G/DL (ref 31.5–35.7)
MCV RBC AUTO: 72.9 FL (ref 79–97)
MONOCYTES # BLD AUTO: 0.85 10*3/MM3 (ref 0.1–0.9)
MONOCYTES NFR BLD AUTO: 12.4 % (ref 5–12)
NEUTROPHILS NFR BLD AUTO: 4.15 10*3/MM3 (ref 1.7–7)
NEUTROPHILS NFR BLD AUTO: 60.5 % (ref 42.7–76)
PLATELET # BLD AUTO: 391 10*3/MM3 (ref 140–450)
PMV BLD AUTO: 8.3 FL (ref 6–12)
RBC # BLD AUTO: 4.28 10*6/MM3 (ref 3.77–5.28)
TIBC SERPL-MCNC: 547 MCG/DL (ref 298–536)
TRANSFERRIN SERPL-MCNC: 367 MG/DL (ref 200–360)
WBC NRBC COR # BLD: 6.86 10*3/MM3 (ref 3.4–10.8)

## 2022-06-21 PROCEDURE — 36415 COLL VENOUS BLD VENIPUNCTURE: CPT

## 2022-06-21 PROCEDURE — 83540 ASSAY OF IRON: CPT

## 2022-06-21 PROCEDURE — 84466 ASSAY OF TRANSFERRIN: CPT

## 2022-06-21 PROCEDURE — 82728 ASSAY OF FERRITIN: CPT

## 2022-06-21 PROCEDURE — 85025 COMPLETE CBC W/AUTO DIFF WBC: CPT

## 2022-07-01 ENCOUNTER — TELEPHONE (OUTPATIENT)
Dept: ONCOLOGY | Facility: CLINIC | Age: 42
End: 2022-07-01

## 2022-07-06 ENCOUNTER — TELEPHONE (OUTPATIENT)
Dept: ONCOLOGY | Facility: CLINIC | Age: 42
End: 2022-07-06

## 2022-07-29 ENCOUNTER — HOSPITAL ENCOUNTER (OUTPATIENT)
Dept: ONCOLOGY | Facility: HOSPITAL | Age: 42
Setting detail: INFUSION SERIES
Discharge: HOME | End: 2022-07-29

## 2022-07-29 VITALS
DIASTOLIC BLOOD PRESSURE: 81 MMHG | OXYGEN SATURATION: 100 % | HEIGHT: 65 IN | BODY MASS INDEX: 35.16 KG/M2 | RESPIRATION RATE: 18 BRPM | SYSTOLIC BLOOD PRESSURE: 129 MMHG | TEMPERATURE: 96.9 F | HEART RATE: 64 BPM | WEIGHT: 211 LBS

## 2022-07-29 DIAGNOSIS — K90.9 MALABSORPTION OF IRON: ICD-10-CM

## 2022-07-29 DIAGNOSIS — D50.9 IRON DEFICIENCY ANEMIA, UNSPECIFIED IRON DEFICIENCY ANEMIA TYPE: Primary | ICD-10-CM

## 2022-07-29 PROCEDURE — 25010000002 IRON SUCROSE PER 1 MG: Performed by: INTERNAL MEDICINE

## 2022-07-29 PROCEDURE — 96365 THER/PROPH/DIAG IV INF INIT: CPT

## 2022-07-29 PROCEDURE — 96366 THER/PROPH/DIAG IV INF ADDON: CPT

## 2022-07-29 RX ORDER — SODIUM CHLORIDE 9 MG/ML
250 INJECTION, SOLUTION INTRAVENOUS ONCE
Status: COMPLETED | OUTPATIENT
Start: 2022-07-29 | End: 2022-07-29

## 2022-07-29 RX ADMIN — SODIUM CHLORIDE 250 ML: 9 INJECTION, SOLUTION INTRAVENOUS at 10:42

## 2022-07-29 RX ADMIN — IRON SUCROSE 300 MG: 20 INJECTION, SOLUTION INTRAVENOUS at 10:46

## 2022-08-01 ENCOUNTER — APPOINTMENT (OUTPATIENT)
Dept: LAB | Facility: HOSPITAL | Age: 42
End: 2022-08-01

## 2022-08-05 ENCOUNTER — HOSPITAL ENCOUNTER (OUTPATIENT)
Dept: ONCOLOGY | Facility: HOSPITAL | Age: 42
Setting detail: INFUSION SERIES
Discharge: HOME | End: 2022-08-05

## 2022-08-05 VITALS
BODY MASS INDEX: 35.16 KG/M2 | DIASTOLIC BLOOD PRESSURE: 80 MMHG | WEIGHT: 211 LBS | SYSTOLIC BLOOD PRESSURE: 127 MMHG | HEIGHT: 65 IN | TEMPERATURE: 96.6 F | HEART RATE: 61 BPM

## 2022-08-05 DIAGNOSIS — D50.9 IRON DEFICIENCY ANEMIA, UNSPECIFIED IRON DEFICIENCY ANEMIA TYPE: Primary | ICD-10-CM

## 2022-08-05 DIAGNOSIS — K90.9 MALABSORPTION OF IRON: ICD-10-CM

## 2022-08-05 PROCEDURE — 96366 THER/PROPH/DIAG IV INF ADDON: CPT

## 2022-08-05 PROCEDURE — 96365 THER/PROPH/DIAG IV INF INIT: CPT

## 2022-08-05 PROCEDURE — 25010000002 IRON SUCROSE PER 1 MG: Performed by: INTERNAL MEDICINE

## 2022-08-05 RX ORDER — SODIUM CHLORIDE 9 MG/ML
250 INJECTION, SOLUTION INTRAVENOUS ONCE
Status: COMPLETED | OUTPATIENT
Start: 2022-08-05 | End: 2022-08-05

## 2022-08-05 RX ADMIN — SODIUM CHLORIDE 250 ML: 9 INJECTION, SOLUTION INTRAVENOUS at 09:14

## 2022-08-05 RX ADMIN — IRON SUCROSE 300 MG: 20 INJECTION, SOLUTION INTRAVENOUS at 09:15

## 2022-08-05 NOTE — PROGRESS NOTES
Patient here for IV venofer, patient has no complaints at this time, patient has AVS for next infusion.

## 2022-08-12 ENCOUNTER — HOSPITAL ENCOUNTER (OUTPATIENT)
Dept: ONCOLOGY | Facility: HOSPITAL | Age: 42
Setting detail: INFUSION SERIES
Discharge: HOME | End: 2022-08-12

## 2022-08-12 VITALS
HEIGHT: 65 IN | RESPIRATION RATE: 18 BRPM | HEART RATE: 66 BPM | TEMPERATURE: 97.1 F | DIASTOLIC BLOOD PRESSURE: 88 MMHG | SYSTOLIC BLOOD PRESSURE: 134 MMHG | BODY MASS INDEX: 35.16 KG/M2 | OXYGEN SATURATION: 97 % | WEIGHT: 211 LBS

## 2022-08-12 DIAGNOSIS — D50.9 IRON DEFICIENCY ANEMIA, UNSPECIFIED IRON DEFICIENCY ANEMIA TYPE: Primary | ICD-10-CM

## 2022-08-12 DIAGNOSIS — K90.9 MALABSORPTION OF IRON: ICD-10-CM

## 2022-08-12 PROCEDURE — 25010000002 IRON SUCROSE PER 1 MG: Performed by: INTERNAL MEDICINE

## 2022-08-12 PROCEDURE — 96365 THER/PROPH/DIAG IV INF INIT: CPT

## 2022-08-12 PROCEDURE — 96366 THER/PROPH/DIAG IV INF ADDON: CPT

## 2022-08-12 RX ORDER — SODIUM CHLORIDE 9 MG/ML
250 INJECTION, SOLUTION INTRAVENOUS ONCE
Status: COMPLETED | OUTPATIENT
Start: 2022-08-12 | End: 2022-08-12

## 2022-08-12 RX ADMIN — SODIUM CHLORIDE 250 ML: 9 INJECTION, SOLUTION INTRAVENOUS at 13:23

## 2022-08-12 RX ADMIN — IRON SUCROSE 300 MG: 20 INJECTION, SOLUTION INTRAVENOUS at 13:23

## 2022-08-19 ENCOUNTER — HOSPITAL ENCOUNTER (OUTPATIENT)
Dept: ONCOLOGY | Facility: HOSPITAL | Age: 42
Setting detail: INFUSION SERIES
Discharge: HOME | End: 2022-08-19

## 2022-08-19 VITALS
OXYGEN SATURATION: 98 % | BODY MASS INDEX: 35.49 KG/M2 | HEART RATE: 60 BPM | HEIGHT: 65 IN | DIASTOLIC BLOOD PRESSURE: 91 MMHG | TEMPERATURE: 97.1 F | SYSTOLIC BLOOD PRESSURE: 153 MMHG | WEIGHT: 213 LBS | RESPIRATION RATE: 18 BRPM

## 2022-08-19 DIAGNOSIS — K90.9 MALABSORPTION OF IRON: ICD-10-CM

## 2022-08-19 DIAGNOSIS — D50.9 IRON DEFICIENCY ANEMIA, UNSPECIFIED IRON DEFICIENCY ANEMIA TYPE: Primary | ICD-10-CM

## 2022-08-19 PROCEDURE — 25010000002 IRON SUCROSE PER 1 MG: Performed by: INTERNAL MEDICINE

## 2022-08-19 PROCEDURE — 96365 THER/PROPH/DIAG IV INF INIT: CPT

## 2022-08-19 PROCEDURE — 96366 THER/PROPH/DIAG IV INF ADDON: CPT

## 2022-08-19 RX ORDER — SODIUM CHLORIDE 9 MG/ML
250 INJECTION, SOLUTION INTRAVENOUS ONCE
Status: COMPLETED | OUTPATIENT
Start: 2022-08-19 | End: 2022-08-19

## 2022-08-19 RX ADMIN — IRON SUCROSE 300 MG: 20 INJECTION, SOLUTION INTRAVENOUS at 13:20

## 2022-08-19 RX ADMIN — SODIUM CHLORIDE 250 ML: 900 INJECTION, SOLUTION INTRAVENOUS at 13:20

## 2022-08-19 NOTE — PROGRESS NOTES
Pt here for C1 D4 venofer infusion. She denies having any issues. Peripheral IV site obtained and infusion administered without difficulty. AVS given at discharge and pt verbalized understanding.

## 2022-08-24 NOTE — PROGRESS NOTES
HEMATOLOGY ONCOLOGY OUTPATIENT FOLLOWUP      Patient name: Christy Castillo  : 1980  MRN: [id number]  Primary Care Physician: Alda Cardona APRN  Referring Physician: Alda Cardona AP*  Reason For Consult:     Chief Complaint   Patient presents with   • Follow-up     Iron deficiency Anemia       HPI:   History of Present Illness:  Christy Castillo is 42 y.o. female who presented to our office on 22 for consultation regarding Iron deficiency Anemia    Patient had labs drawn in 2022 -     Hb 9.5 , hct 32.3, MCV 77.8, plt 372, no prior CBC recently. 3-4 yrs ago hemoglobin was normal.  Patient was given oral iron.    2022 - Hb 10.1, hct 34, MCV 76.2, plt 379, vitamin B12 336, ferritin 6.31, iron sat 5, TIBC 550  Patient has been taking oral iron. No rectal bleeding.   2022 - iv venofer x4     2022 - Hb 11.6, WBC 7.94      Subjective:  • Fatigue has imrpoved.    The following portions of the patient's history were reviewed and updated as appropriate: allergies, current medications, past family history, past medical history, past social history, past surgical history and problem list.    Past Medical History:   Diagnosis Date   • Anemia ?    RNY gastric bypass. Periodically get ferritin infusions   • Edema    • Fatigue    • Gall stones    • Gastric band slippage     RESOLVED   • Hypertension     Atypical. Was in pain with this BP   • Joint pain    • Joint pain, knee    • Obesity, morbid (HCC)    • Plantar fasciitis     HISTORY   • PONV (postoperative nausea and vomiting)     SEVERE N/V WITH GAS WITH GASTRIC BANDING   • Pseudotumor cerebri    • Urge and stress incontinence        Past Surgical History:   Procedure Laterality Date   • BREAST BIOPSY Right     @[facility]---no clip placed   • CHOLECYSTECTOMY     • COLONOSCOPY      Fiberoptic   • GASTRIC BANDING REMOVAL      Laparosc Restrictive Proc Adjustable Gastric Band Removal   • LAPAROSCOPIC GASTRIC BANDING  "     Laparosc Restrictive Proc Adjustable Gastric Band Placement   • [place] LAP GASTRIC BYPASS/ANCA-EN-Y N/A 05/02/2016    Procedure: REVISION GASTRIC BYPASS LAPAROSCOPIC , PREVIOUS LAP BAND,WITH LYSIS OF ADHESIONS;  Surgeon: Quinn Enrique Jr., MD;  [place]: [facility] [place] [facility] [place];  Service: General         Current Outpatient Medications:   •  Cyanocobalamin 1000 MCG/ML kit, Inject  as directed., Disp: , Rfl:   •  Multiple Vitamins-Minerals (MULTIVITAMIN ADULT PO), Take  by mouth Daily., Disp: , Rfl:   •  omeprazole (priLOSEC) 20 MG capsule, Take 20 mg by mouth Daily., Disp: , Rfl:     Allergies   Allergen Reactions   • Other Nausea And Vomiting     Anesthesia gas     Only gas-- not iv       Family History   Problem Relation Age of Onset   • Heart disease Other    • Hypertension Other    • Heart attack Other    • Sleep apnea Other    • Obesity Other         Morbid   • Hypertension Mother    • Hypertension Father        Cancer-related family history is not on file.    Social History     Tobacco Use   • Smoking status: Never Smoker   • Smokeless tobacco: Never Used   Vaping Use   • Vaping Use: Never used   Substance Use Topics   • Alcohol use: Yes     Alcohol/week: 2.0 standard drinks     Types: 2 Drinks containing 0.5 oz of alcohol per week     Comment: Have cut down to zero   • Drug use: No     Social History     Social History Narrative   • Not on file          Objective:    Vitals:    08/25/22 1044   BP: 144/82   Pulse: 62   Resp: 18   Temp: 96.8 °F (36 °C)   SpO2: 100%   Weight: 96.6 kg (213 lb)   Height: 165.1 cm (65\")   PainSc: 0-No pain     Body mass index is 35.45 kg/m².  ECOG  (0) Fully active, able to carry on all predisease performance without restriction    Physical Exam:     Physical Exam  Constitutional:       Appearance: Normal appearance.   HENT:      Head: Normocephalic and atraumatic.      Mouth/Throat:      Mouth: Mucous membranes are moist.   Eyes:      Pupils: Pupils are equal, round, and reactive to " light.   Cardiovascular:      Rate and Rhythm: Normal rate and regular rhythm.      Pulses: Normal pulses.      Heart sounds: No murmur heard.  Pulmonary:      Effort: Pulmonary effort is normal.      Breath sounds: Normal breath sounds.   Abdominal:      General: There is no distension.      Palpations: Abdomen is soft. There is no mass.      Tenderness: There is no abdominal tenderness.   Musculoskeletal:         General: Normal range of motion.      Cervical back: Normal range of motion and neck supple.   Skin:     General: Skin is warm.   Neurological:      General: No focal deficit present.      Mental Status: She is alert.   Psychiatric:         Mood and Affect: Mood normal.           Lab Results - Last 18 Months   Lab Units 08/25/22  1041 06/21/22  0916 04/07/22  0932   WBC 10*3/mm3 7.94 6.86 7.48   HEMOGLOBIN g/dL 11.6* 9.1* 10.1*   HEMATOCRIT % 37.9 31.2* 34.0   PLATELETS 10*3/mm3 223 391 379   MCV fL 83.1 72.9* 76.2*     Lab Results - Last 18 Months   Lab Units 04/07/22  0932 02/21/22  1550   SODIUM mmol/L 140 138   POTASSIUM mmol/L 4.0 4.0   CHLORIDE mmol/L 102 100   CO2 mmol/L 24.5 27.7   BUN mg/dL 7 9   CREATININE mg/dL 0.57 0.67   CALCIUM mg/dL 9.1 9.2   BILIRUBIN mg/dL 0.3  --    ALK PHOS U/L 82  --    ALT (SGPT) U/L 11  --    AST (SGOT) U/L 17  --    GLUCOSE mg/dL 78 82       Lab Results   Component Value Date    GLUCOSE 78 04/07/2022    BUN 7 04/07/2022    CREATININE 0.57 04/07/2022    EGFRIFNONA 96 02/21/2022    BCR 12.3 04/07/2022    K 4.0 04/07/2022    CO2 24.5 04/07/2022    CALCIUM 9.1 04/07/2022    ALBUMIN 4.00 04/07/2022    AST 17 04/07/2022    ALT 11 04/07/2022       No results for input(s): APTT, INR, PTT in the last 29109 hours.    Lab Results   Component Value Date    IRON 24 (L) 06/21/2022    TIBC 547 (H) 06/21/2022    FERRITIN 13.16 06/21/2022       Lab Results   Component Value Date    FOLATE 5.70 11/04/2016       No results found for: OCCULTBLD    No results found for:  RETICCTPCT    Lab Results   Component Value Date    ATKSFJVZ70 336 04/07/2022     No results found for: SPEP, UPEP  No results found for: LDH, URICACID  No results found for: MARTIN, RF, SEDRATE  No results found for: FIBRINOGEN, HAPTOGLOBIN    No results found for: PTT, INR  No results found for:   No results found for: CEA  No components found for: CA-19-9  No results found for: PSA   No results found for: AFPTM, EV8491, HCGTM, SPEP, MIKEY         Assessment & Plan     Patient is a 42 yr old female with microcytic anemia secondary to iron deficiency.     Iron deficiency Anemia  Microcytic anemia with elevated TIBC and low iron sat points to severe iron deficiency.  With oral iron malabsorption, was given iv iron infusion with venofer for 4 doses.  Now improvement   Likely reason is poor absorption with gastric bypass.  Now repeat in 4 months    Vitamin B12 injection  Currently on monthly B12  Repeat levels prior       Thank you very much for providing the opportunity to participate in this patient’s care. Please do not hesitate to call if there are any other questions  George Lipscomb MD

## 2022-08-25 ENCOUNTER — OFFICE VISIT (OUTPATIENT)
Dept: ONCOLOGY | Facility: CLINIC | Age: 42
End: 2022-08-25

## 2022-08-25 ENCOUNTER — APPOINTMENT (OUTPATIENT)
Dept: LAB | Facility: HOSPITAL | Age: 42
End: 2022-08-25

## 2022-08-25 VITALS
HEIGHT: 65 IN | DIASTOLIC BLOOD PRESSURE: 82 MMHG | HEART RATE: 62 BPM | TEMPERATURE: 96.8 F | BODY MASS INDEX: 35.49 KG/M2 | WEIGHT: 213 LBS | OXYGEN SATURATION: 100 % | SYSTOLIC BLOOD PRESSURE: 144 MMHG | RESPIRATION RATE: 18 BRPM

## 2022-08-25 DIAGNOSIS — D50.9 IRON DEFICIENCY ANEMIA, UNSPECIFIED IRON DEFICIENCY ANEMIA TYPE: Primary | ICD-10-CM

## 2022-08-25 LAB
HOLD SPECIMEN: NORMAL
HOLD SPECIMEN: NORMAL

## 2022-08-25 PROCEDURE — 99213 OFFICE O/P EST LOW 20 MIN: CPT | Performed by: INTERNAL MEDICINE

## 2022-09-29 ENCOUNTER — APPOINTMENT (OUTPATIENT)
Dept: VACCINE CLINIC | Facility: HOSPITAL | Age: 42
End: 2022-09-29

## 2022-10-06 ENCOUNTER — HOSPITAL ENCOUNTER (EMERGENCY)
Facility: HOSPITAL | Age: 42
Discharge: HOME | End: 2022-10-06
Attending: EMERGENCY MEDICINE | Admitting: EMERGENCY MEDICINE

## 2022-10-06 ENCOUNTER — OFFICE VISIT (OUTPATIENT)
Dept: FAMILY MEDICINE CLINIC | Facility: CLINIC | Age: 42
End: 2022-10-06

## 2022-10-06 ENCOUNTER — APPOINTMENT (OUTPATIENT)
Dept: CT IMAGING | Facility: HOSPITAL | Age: 42
End: 2022-10-06

## 2022-10-06 VITALS
RESPIRATION RATE: 18 BRPM | HEIGHT: 65 IN | SYSTOLIC BLOOD PRESSURE: 180 MMHG | WEIGHT: 213 LBS | DIASTOLIC BLOOD PRESSURE: 120 MMHG | BODY MASS INDEX: 35.49 KG/M2 | HEART RATE: 83 BPM | TEMPERATURE: 98.2 F | OXYGEN SATURATION: 97 %

## 2022-10-06 VITALS
HEART RATE: 93 BPM | DIASTOLIC BLOOD PRESSURE: 84 MMHG | SYSTOLIC BLOOD PRESSURE: 145 MMHG | WEIGHT: 213.41 LBS | TEMPERATURE: 98 F | RESPIRATION RATE: 17 BRPM | OXYGEN SATURATION: 100 % | BODY MASS INDEX: 35.56 KG/M2 | HEIGHT: 65 IN

## 2022-10-06 DIAGNOSIS — K57.92 DIVERTICULITIS: Primary | ICD-10-CM

## 2022-10-06 DIAGNOSIS — R10.32 LEFT LOWER QUADRANT PAIN: ICD-10-CM

## 2022-10-06 DIAGNOSIS — R10.32 LLQ ABDOMINAL PAIN: ICD-10-CM

## 2022-10-06 DIAGNOSIS — R19.15 HYPOACTIVE BOWEL SOUNDS: Primary | ICD-10-CM

## 2022-10-06 DIAGNOSIS — R03.0 ELEVATED BLOOD PRESSURE READING: ICD-10-CM

## 2022-10-06 LAB
ALBUMIN SERPL-MCNC: 3.9 G/DL (ref 3.5–5.2)
ALBUMIN/GLOB SERPL: 1.1 G/DL
ALP SERPL-CCNC: 82 U/L (ref 39–117)
ALT SERPL W P-5'-P-CCNC: 15 U/L (ref 1–33)
ANION GAP SERPL CALCULATED.3IONS-SCNC: 12 MMOL/L (ref 5–15)
ANISOCYTOSIS BLD QL: NORMAL
AST SERPL-CCNC: 17 U/L (ref 1–32)
B-HCG UR QL: NEGATIVE
BACTERIA UR QL AUTO: ABNORMAL /HPF
BASOPHILS # BLD AUTO: 0 10*3/MM3 (ref 0–0.2)
BASOPHILS NFR BLD AUTO: 0.4 % (ref 0–1.5)
BILIRUB SERPL-MCNC: 0.7 MG/DL (ref 0–1.2)
BILIRUB UR QL STRIP: NEGATIVE
BUN SERPL-MCNC: 7 MG/DL (ref 6–20)
BUN/CREAT SERPL: 10.1 (ref 7–25)
CALCIUM SPEC-SCNC: 9.4 MG/DL (ref 8.6–10.5)
CHLORIDE SERPL-SCNC: 98 MMOL/L (ref 98–107)
CLARITY UR: CLEAR
CO2 SERPL-SCNC: 27 MMOL/L (ref 22–29)
COLOR UR: ABNORMAL
CREAT SERPL-MCNC: 0.69 MG/DL (ref 0.57–1)
DEPRECATED RDW RBC AUTO: 68.3 FL (ref 37–54)
EGFRCR SERPLBLD CKD-EPI 2021: 109.9 ML/MIN/1.73
EOSINOPHIL # BLD AUTO: 0 10*3/MM3 (ref 0–0.4)
EOSINOPHIL NFR BLD AUTO: 0.4 % (ref 0.3–6.2)
ERYTHROCYTE [DISTWIDTH] IN BLOOD BY AUTOMATED COUNT: 22.6 % (ref 12.3–15.4)
GLOBULIN UR ELPH-MCNC: 3.6 GM/DL
GLUCOSE SERPL-MCNC: 100 MG/DL (ref 65–99)
GLUCOSE UR STRIP-MCNC: NEGATIVE MG/DL
HCT VFR BLD AUTO: 39.9 % (ref 34–46.6)
HGB BLD-MCNC: 12.6 G/DL (ref 12–15.9)
HGB UR QL STRIP.AUTO: ABNORMAL
HOLD SPECIMEN: NORMAL
HYALINE CASTS UR QL AUTO: ABNORMAL /LPF
KETONES UR QL STRIP: ABNORMAL
LEUKOCYTE ESTERASE UR QL STRIP.AUTO: NEGATIVE
LIPASE SERPL-CCNC: 16 U/L (ref 13–60)
LYMPHOCYTES # BLD AUTO: 1.8 10*3/MM3 (ref 0.7–3.1)
LYMPHOCYTES NFR BLD AUTO: 15.3 % (ref 19.6–45.3)
MCH RBC QN AUTO: 28 PG (ref 26.6–33)
MCHC RBC AUTO-ENTMCNC: 31.6 G/DL (ref 31.5–35.7)
MCV RBC AUTO: 88.7 FL (ref 79–97)
MONOCYTES # BLD AUTO: 1 10*3/MM3 (ref 0.1–0.9)
MONOCYTES NFR BLD AUTO: 8.2 % (ref 5–12)
NEUTROPHILS NFR BLD AUTO: 75.7 % (ref 42.7–76)
NEUTROPHILS NFR BLD AUTO: 8.9 10*3/MM3 (ref 1.7–7)
NITRITE UR QL STRIP: NEGATIVE
NRBC BLD AUTO-RTO: 0 /100 WBC (ref 0–0.2)
PH UR STRIP.AUTO: 6.5 [PH] (ref 5–8)
PLAT MORPH BLD: NORMAL
PLATELET # BLD AUTO: 273 10*3/MM3 (ref 140–450)
PMV BLD AUTO: 7.3 FL (ref 6–12)
POTASSIUM SERPL-SCNC: 3.7 MMOL/L (ref 3.5–5.2)
PROT SERPL-MCNC: 7.5 G/DL (ref 6–8.5)
PROT UR QL STRIP: NEGATIVE
RBC # BLD AUTO: 4.5 10*6/MM3 (ref 3.77–5.28)
RBC # UR STRIP: ABNORMAL /HPF
REF LAB TEST METHOD: ABNORMAL
SODIUM SERPL-SCNC: 137 MMOL/L (ref 136–145)
SP GR UR STRIP: 1.02 (ref 1–1.03)
SQUAMOUS #/AREA URNS HPF: ABNORMAL /HPF
UROBILINOGEN UR QL STRIP: ABNORMAL
WBC # UR STRIP: ABNORMAL /HPF
WBC MORPH BLD: NORMAL
WBC NRBC COR # BLD: 11.7 10*3/MM3 (ref 3.4–10.8)
WHOLE BLOOD HOLD COAG: NORMAL

## 2022-10-06 PROCEDURE — 81001 URINALYSIS AUTO W/SCOPE: CPT | Performed by: PHYSICIAN ASSISTANT

## 2022-10-06 PROCEDURE — 25010000002 PIPERACILLIN SOD-TAZOBACTAM PER 1 G: Performed by: PHYSICIAN ASSISTANT

## 2022-10-06 PROCEDURE — 85007 BL SMEAR W/DIFF WBC COUNT: CPT | Performed by: PHYSICIAN ASSISTANT

## 2022-10-06 PROCEDURE — 96365 THER/PROPH/DIAG IV INF INIT: CPT

## 2022-10-06 PROCEDURE — 80053 COMPREHEN METABOLIC PANEL: CPT | Performed by: PHYSICIAN ASSISTANT

## 2022-10-06 PROCEDURE — 99284 EMERGENCY DEPT VISIT MOD MDM: CPT

## 2022-10-06 PROCEDURE — 99213 OFFICE O/P EST LOW 20 MIN: CPT | Performed by: NURSE PRACTITIONER

## 2022-10-06 PROCEDURE — 87040 BLOOD CULTURE FOR BACTERIA: CPT | Performed by: PHYSICIAN ASSISTANT

## 2022-10-06 PROCEDURE — 83690 ASSAY OF LIPASE: CPT | Performed by: PHYSICIAN ASSISTANT

## 2022-10-06 PROCEDURE — 81025 URINE PREGNANCY TEST: CPT | Performed by: PHYSICIAN ASSISTANT

## 2022-10-06 PROCEDURE — 85025 COMPLETE CBC W/AUTO DIFF WBC: CPT | Performed by: PHYSICIAN ASSISTANT

## 2022-10-06 PROCEDURE — 36415 COLL VENOUS BLD VENIPUNCTURE: CPT

## 2022-10-06 PROCEDURE — 74176 CT ABD & PELVIS W/O CONTRAST: CPT

## 2022-10-06 RX ORDER — AMOXICILLIN AND CLAVULANATE POTASSIUM 875; 125 MG/1; MG/1
1 TABLET, FILM COATED ORAL 3 TIMES DAILY
Qty: 30 TABLET | Refills: 0 | Status: SHIPPED | OUTPATIENT
Start: 2022-10-06 | End: 2022-10-16

## 2022-10-06 RX ORDER — TRAMADOL HYDROCHLORIDE 50 MG/1
50 TABLET ORAL EVERY 6 HOURS PRN
Qty: 12 TABLET | Refills: 0 | Status: SHIPPED | OUTPATIENT
Start: 2022-10-06 | End: 2023-02-20

## 2022-10-06 RX ORDER — FLUCONAZOLE 150 MG/1
150 TABLET ORAL ONCE
Qty: 1 TABLET | Refills: 0 | Status: SHIPPED | OUTPATIENT
Start: 2022-10-06 | End: 2022-10-07

## 2022-10-06 RX ORDER — SODIUM CHLORIDE 0.9 % (FLUSH) 0.9 %
10 SYRINGE (ML) INJECTION AS NEEDED
Status: DISCONTINUED | OUTPATIENT
Start: 2022-10-06 | End: 2022-10-06 | Stop reason: HOSPADM

## 2022-10-06 RX ADMIN — PIPERACILLIN AND TAZOBACTAM 3.38 G: 3; .375 INJECTION, POWDER, FOR SOLUTION INTRAVENOUS at 16:20

## 2022-10-06 NOTE — ED PROVIDER NOTES
Subjective   History of Present Illness  Chief Complaint: Abdominal pain    Patient is a 42-year-old  female history of anemia, hypertension, presents to the ER with complaints of left lower quadrant abdominal pain for few days.  Patient describes pain as a constant aching pain in the left lower quadrant that does not radiate that she currently rates a 5/10.  She reports nausea, no vomiting.  Few episodes of diarrhea yesterday, not today.  No dysuria or hematuria.  No vaginal bleeding or discharge.  No chest pain shortness of breath headache or fever or chills.  Patient has had previous gastric bypass and cholecystectomy.    Location: Left lower quadrant    Quality: Aching    Duration: Few days    Timing: Intermittent    Severity: Mild to mod    Associated Symptoms: Diarrhea    PCP: Alda Cardona    History provided by:  Patient      Review of Systems   Constitutional: Positive for chills. Negative for fever.   HENT: Negative for sore throat and trouble swallowing.    Eyes: Negative.    Respiratory: Negative for shortness of breath and wheezing.    Cardiovascular: Negative for chest pain.   Gastrointestinal: Positive for abdominal pain and diarrhea. Negative for rectal pain and vomiting.   Endocrine: Negative.    Genitourinary: Negative for dysuria.   Musculoskeletal: Negative for myalgias.   Skin: Negative for rash.   Allergic/Immunologic: Negative.    Neurological: Negative for weakness and headaches.   Psychiatric/Behavioral: Negative for behavioral problems.   All other systems reviewed and are negative.      Past Medical History:   Diagnosis Date   • Anemia 2017?    RNY gastric bypass. Periodically get ferritin infusions   • Edema    • Fatigue    • Gall stones    • Gastric band slippage     RESOLVED   • Hypertension     Atypical. Was in pain with this BP   • Joint pain    • Joint pain, knee    • Obesity, morbid (HCC)    • Plantar fasciitis     HISTORY   • PONV (postoperative nausea and vomiting)      SEVERE N/V WITH GAS WITH GASTRIC BANDING   • Pseudotumor cerebri    • Urge and stress incontinence        Allergies   Allergen Reactions   • Other Nausea And Vomiting     Anesthesia gas     Only gas-- not iv       Past Surgical History:   Procedure Laterality Date   • BREAST BIOPSY Right     @[facility]---no clip placed   • CHOLECYSTECTOMY     • COLONOSCOPY      Fiberoptic   • GASTRIC BANDING REMOVAL      Laparosc Restrictive Proc Adjustable Gastric Band Removal   • LAPAROSCOPIC GASTRIC BANDING      Laparosc Restrictive Proc Adjustable Gastric Band Placement   • [place] LAP GASTRIC BYPASS/ANCA-EN-Y N/A 05/02/2016    Procedure: REVISION GASTRIC BYPASS LAPAROSCOPIC , PREVIOUS LAP BAND,WITH LYSIS OF ADHESIONS;  Surgeon: Quinn Enrique Jr., MD;  [place] [facility] [place] [facility] [place]  Service: General       Family History   Problem Relation Age of Onset   • Heart disease Other    • Hypertension Other    • Heart attack Other    • Sleep apnea Other    • Obesity Other         Morbid   • Hypertension Mother    • Hypertension Father        Social History     Socioeconomic History   • Marital status:    Tobacco Use   • Smoking status: Never Smoker   • Smokeless tobacco: Never Used   Vaping Use   • Vaping Use: Never used   Substance and Sexual Activity   • Alcohol use: Yes     Alcohol/week: 2.0 standard drinks     Types: 2 Drinks containing 0.5 oz of alcohol per week     Comment: Have cut down to zero   • Drug use: No   • Sexual activity: Yes     Partners: Male     Birth control/protection: Surgical           Objective   Physical Exam  Vitals and nursing note reviewed.   Constitutional:       Appearance: Normal appearance. She is well-developed and normal weight. She is not ill-appearing or toxic-appearing.   HENT:      Head: Normocephalic and atraumatic.   Eyes:      Extraocular Movements: Extraocular movements intact.      Pupils: Pupils are equal, round, and reactive to light.   Cardiovascular:      Rate and Rhythm: Normal rate and  "regular rhythm.      Heart sounds: Normal heart sounds.   Pulmonary:      Effort: Pulmonary effort is normal. No respiratory distress.      Breath sounds: Normal breath sounds. No wheezing.   Abdominal:      General: Abdomen is flat. Bowel sounds are normal. There is no distension.      Palpations: Abdomen is soft.      Tenderness: There is abdominal tenderness in the left lower quadrant. There is no right CVA tenderness, left CVA tenderness, guarding or rebound.   Skin:     General: Skin is warm and dry.      Capillary Refill: Capillary refill takes less than 2 seconds.      Findings: No rash.   Neurological:      General: No focal deficit present.      Mental Status: She is alert and oriented to person, place, and time.   Psychiatric:         Behavior: Behavior normal.         Procedures           ED Course  ED Course as of 10/06/22 1815   Thu Oct 06, 2022   1345 RBC, UA(!): 31-50  Patient reports she is currently at the end of her menstrual cycle. [MC]      ED Course User Index  [MC] Dana Castaneda TONY Mitchell    /84   Pulse 93   Temp 98 °F (36.7 °C) (Oral)   Resp 17   Ht 165.1 cm (65\")   Wt 96.8 kg (213 lb 6.5 oz)   LMP 10/03/2022 (Exact Date)   SpO2 100%   BMI 35.51 kg/m²   Labs Reviewed   COMPREHENSIVE METABOLIC PANEL - Abnormal; Notable for the following components:       Result Value    Glucose 100 (*)     All other components within normal limits    Narrative:     GFR Normal >60  Chronic Kidney Disease <60  Kidney Failure <15     URINALYSIS W/ MICROSCOPIC IF INDICATED (NO CULTURE) - Abnormal; Notable for the following components:    Color, UA Dark Yellow (*)     Ketones, UA 15 mg/dL (1+) (*)     Blood, UA Moderate (2+) (*)     All other components within normal limits   CBC WITH AUTO DIFFERENTIAL - Abnormal; Notable for the following components:    WBC 11.70 (*)     RDW 22.6 (*)     RDW-SD 68.3 (*)     Lymphocyte % 15.3 (*)     Neutrophils, Absolute 8.90 (*)     Monocytes, Absolute 1.00 (*)     All " other components within normal limits    Narrative:     Appended report. These results have been appended to a previously verified report.   URINALYSIS, MICROSCOPIC ONLY - Abnormal; Notable for the following components:    RBC, UA 31-50 (*)     WBC, UA 0-2 (*)     All other components within normal limits   LIPASE - Normal   PREGNANCY, URINE - Normal   BLOOD CULTURE   BLOOD CULTURE   SCAN SLIDE    Narrative:     Slide Reviewed     CBC AND DIFFERENTIAL    Narrative:     The following orders were created for panel order CBC & Differential.  Procedure                               Abnormality         Status                     ---------                               -----------         ------                     CBC Auto Differential[[id number]]        Abnormal            Final result               Scan Slide[[id number]]                                       Final result                 Please view results for these tests on the individual orders.   EXTRA TUBES    Narrative:     The following orders were created for panel order Extra Tubes.  Procedure                               Abnormality         Status                     ---------                               -----------         ------                     Gold Top - SST[[id number]]                                   Final result               Light Blue Top[[id number]]                                   Final result                 Please view results for these tests on the individual orders.   GOLD TOP - SST   LIGHT BLUE TOP     Medications   sodium chloride 0.9 % flush 10 mL (has no administration in time range)   piperacillin-tazobactam (ZOSYN) IVPB 3.375 g in 100 mL NS (CD) (0 g Intravenous Stopped 10/6/22 1650)     CT Abdomen Pelvis Without Contrast    Result Date: 10/6/2022  1. Acute diverticulitis at junction of sigmoid and descending colon. Small adjacent free fluid. No organized abscess. 2. Fatty liver. 3. Prior cholecystectomy and prior gastric bypass.   Electronically Signed By-Panfilo Ochoa MD On:10/6/2022 1:24 PM This report was finalized on [id number] by  Panfilo Ocoha MD.                                           MDM  Number of Diagnoses or Management Options  Diverticulitis  LLQ abdominal pain  Diagnosis management comments: MEDICAL DECISION  Epic Chart Review: No recent admissions  Comorbidities: Anemia, hypertension  Differentials: Diverticulitis, pyelonephritis, UTI, kidney stone; this list is not all inclusive and does not constitute the entirety of considered causes  Radiology interpretation:  Images reviewed by me and interpreted by radiologist, as above  Lab interpretation:  Labs viewed by me significant for, as above    While in the ED IV was placed and labs were obtained appropriate PPE was worn during exam and throughout all encounters with the patient.  Patient had the above evaluation.  IV established, lab work obtained.  Patient offered pain medication, she declined.  Urinalysis negative for UTI.  Urine pregnancy negative.  CBC mild leukocytosis 11,000.  CMP within normal limits.  Lipase normal.  CT imaging was obtained without contrast due to current IV contrast shortage and hospital protocol.  CT abdomen pelvis shows acute diverticulitis at junction of the sigmoid and descending colon, no organized abscess or perforation.  Blood cultures obtained and currently pending.  Patient given IV Zosyn while in the ER.  Patient discharged with prescription for Augmentin and tramadol.  Inspect reviewed.  Patient to follow-up with primary care for recheck.    Discharge plan and instructions were discussed with the patient who verbalized understanding and is in agreement with the plan, all questions were answered at this time.  Patient is aware of signs symptoms that would require immediate return to the emergency room.  Patient understands importance of following up with primary care provider for further evaluation and worsening concerns as well as blood  pressure recheck in the next 4 weeks.    Patient was discharged in improved stable condition with an upright steady gait.       Amount and/or Complexity of Data Reviewed  Clinical lab tests: reviewed and ordered  Tests in the radiology section of CPT®: reviewed and ordered    Patient Progress  Patient progress: stable      Final diagnoses:   Diverticulitis   LLQ abdominal pain       ED Disposition  ED Disposition     ED Disposition   Discharge    Condition   Stable    Comment   --             Alda Cardona, ROYCE  [address] [facility] [place] [address]  [phone]    Schedule an appointment as soon as possible for a visit in 2 days  Return to the ER for new or worsening symptoms, As needed, If symptoms worsen    Bhaskar Ireland MD  [address] [facility] [place] [address] [phone]    Schedule an appointment as soon as possible for a visit in 2 days  As needed, If symptoms worsen         Medication List      New Prescriptions    amoxicillin-clavulanate 875-125 MG per tablet  Commonly known as: AUGMENTIN  Take 1 tablet by mouth 3 (Three) Times a Day for 10 days.     fluconazole 150 MG tablet  Commonly known as: DIFLUCAN  Take 1 tablet by mouth 1 (One) Time for 1 dose.     traMADol 50 MG tablet  Commonly known as: ULTRAM  Take 1 tablet by mouth Every 6 (Six) Hours As Needed for Moderate Pain.           Where to Get Your Medications      These medications were sent to [facility] [address] [place] [address]    Hours: Mon-Fri 7:00AM-7:00PM Phone: [phone]   · amoxicillin-clavulanate 875-125 MG per tablet  · fluconazole 150 MG tablet  · traMADol 50 MG tablet          Dana Castaneda PA  10/06/22 2407

## 2022-10-06 NOTE — PROGRESS NOTES
Chief Complaint   Patient presents with   • Abdominal Pain     Patient is having lower left quadrant pain, radiating around to the back, this starting yesterday.         Subjective   Christy Castillo is a 42 y.o.  female who presents today for left lower quadrant pain   HPI   Pt diaphoretic,  hypoactive bowel sounds with guarding and severe pain blood pressure elevated  Recommend going to ER for work up due to severity of symtoms  This started last night and only has been worsening  Pt with hx of gastric bypass does not feel its related  Pt used heating pad with no relief.  No diarrhea no vomiting      Christy Castillo  has a past medical history of Anemia (2017?), Edema, Fatigue, Gall stones, Gastric band slippage, Hypertension, Joint pain, Joint pain, knee, Obesity, morbid (HCC), Plantar fasciitis, PONV (postoperative nausea and vomiting), Pseudotumor cerebri, and Urge and stress incontinence.    Allergies   Allergen Reactions   • Other Nausea And Vomiting     Anesthesia gas     Only gas-- not iv       Current Outpatient Medications:   •  Cyanocobalamin 1000 MCG/ML kit, Inject  as directed., Disp: , Rfl:   •  Multiple Vitamins-Minerals (MULTIVITAMIN ADULT PO), Take  by mouth Daily., Disp: , Rfl:   •  omeprazole (priLOSEC) 20 MG capsule, Take 20 mg by mouth Daily., Disp: , Rfl:   Past Medical History:   Diagnosis Date   • Anemia 2017?   • Edema    • Fatigue    • Gall stones    • Gastric band slippage    • Hypertension    • Joint pain    • Joint pain, knee    • Obesity, morbid (HCC)    • Plantar fasciitis    • PONV (postoperative nausea and vomiting)    • Pseudotumor cerebri    • Urge and stress incontinence      Past Surgical History:   Procedure Laterality Date   • BREAST BIOPSY Right     @[facility]---no clip placed   • CHOLECYSTECTOMY     • COLONOSCOPY      Fiberoptic   • GASTRIC BANDING REMOVAL      Laparosc Restrictive Proc Adjustable Gastric Band Removal   • LAPAROSCOPIC GASTRIC BANDING      Laparosc Restrictive Proc  Adjustable Gastric Band Placement   • DE LAP GASTRIC BYPASS/ANCA-EN-Y N/A 05/02/2016    Procedure: REVISION GASTRIC BYPASS LAPAROSCOPIC , PREVIOUS LAP BAND,WITH LYSIS OF ADHESIONS;  Surgeon: Quinn Enrique Jr., MD;  [place] [facility] [place] [facility] [place]: General     Social History     Socioeconomic History   • Marital status:    Tobacco Use   • Smoking status: Never Smoker   • Smokeless tobacco: Never Used   Vaping Use   • Vaping Use: Never used   Substance and Sexual Activity   • Alcohol use: Yes     Alcohol/week: 2.0 standard drinks     Types: 2 Drinks containing 0.5 oz of alcohol per week     Comment: Have cut down to zero   • Drug use: No   • Sexual activity: Yes     Partners: Male     Birth control/protection: Surgical     Family History   Problem Relation Age of Onset   • Heart disease Other    • Hypertension Other    • Heart attack Other    • Sleep apnea Other    • Obesity Other         Morbid   • Hypertension Mother    • Hypertension Father      PHQ-2 Depression Screening  Little interest or pleasure in doing things?     Feeling down, depressed, or hopeless?     PHQ-2 Total Score       PHQ-9 Depression Screening  Little interest or pleasure in doing things?     Feeling down, depressed, or hopeless?     Trouble falling or staying asleep, or sleeping too much?     Feeling tired or having little energy?     Poor appetite or overeating?     Feeling bad about yourself - or that you are a failure or have let yourself or your family down?     Trouble concentrating on things, such as reading the newspaper or watching television?     Moving or speaking so slowly that other people could have noticed? Or the opposite - being so fidgety or restless that you have been moving around a lot more than usual?     Thoughts that you would be better off dead, or of hurting yourself in some way?     PHQ-9 Total Score     If you checked off any problems, how difficult have these problems made it for you to do your work,  "take care of things at home, or get along with other people?         Family history, surgical history, past medical history, Allergies and med's reviewed with patient today and updated in [employer] EMR.     ROS:  Review of Systems    OBJECTIVE:  Vitals:    10/06/22 0957 10/06/22 1000 10/06/22 1003   BP: (!) 170/120 (!) 160/110 (!) 180/120   BP Location: Left arm Left arm Right arm   Patient Position: Sitting Sitting Sitting   Cuff Size: Adult Adult Adult   Pulse: 79 83 83   Resp: 18     Temp: 98.2 °F (36.8 °C)     TempSrc: Temporal     SpO2: 97%     Weight: 96.6 kg (213 lb)     Height: 165.1 cm (65\")       Body mass index is 35.45 kg/m².  Physical Exam  Vitals and nursing note reviewed.   Constitutional:       Appearance: Normal appearance. She is well-developed.   HENT:      Head: Normocephalic.   Eyes:      Pupils: Pupils are equal, round, and reactive to light.   Cardiovascular:      Rate and Rhythm: Normal rate and regular rhythm.   Pulmonary:      Effort: Pulmonary effort is normal.      Breath sounds: Normal breath sounds.   Abdominal:      General: Bowel sounds are normal.      Palpations: Abdomen is soft.      Tenderness: There is abdominal tenderness. There is guarding. There is no rebound.      Comments: Hypoactive bowel sounds left  Has not eaten today    Musculoskeletal:         General: Normal range of motion.      Cervical back: Normal range of motion and neck supple.   Skin:     General: Skin is warm and dry.   Neurological:      Mental Status: She is alert and oriented to person, place, and time.   Psychiatric:         Behavior: Behavior normal.         Thought Content: Thought content normal.         Judgment: Judgment normal.         ASSESSMENT/ PLAN:    Diagnoses and all orders for this visit:    1. Hypoactive bowel sounds (Primary)    2. Left lower quadrant pain        Orders Placed Today:     No orders of the defined types were placed in this encounter.       Management Plan:   Called and give report " to ER to [facility]  Pt felt comfortable enough to drive self  An After Visit Summary was printed and given to the patient at discharge.    Follow-up: No follow-ups on file.    ROYCE Arellano 10/6/2022 10:40 EDT  This note was electronically signed.

## 2022-10-06 NOTE — DISCHARGE INSTRUCTIONS
Take Augmentin antibiotics to completion.  Take tramadol as needed for pain.    Drink plenty of fluids  Follow-up with primary care for recheck return to the ER for new or worsening symptoms

## 2022-10-11 LAB
BACTERIA SPEC AEROBE CULT: NORMAL
BACTERIA SPEC AEROBE CULT: NORMAL

## 2022-12-13 ENCOUNTER — HOSPITAL ENCOUNTER (OUTPATIENT)
Facility: HOSPITAL | Age: 42
Discharge: HOME | End: 2022-12-13
Attending: EMERGENCY MEDICINE

## 2022-12-13 ENCOUNTER — APPOINTMENT (OUTPATIENT)
Dept: GENERAL RADIOLOGY | Facility: HOSPITAL | Age: 42
End: 2022-12-13

## 2022-12-13 VITALS
BODY MASS INDEX: 34.99 KG/M2 | OXYGEN SATURATION: 97 % | DIASTOLIC BLOOD PRESSURE: 94 MMHG | RESPIRATION RATE: 18 BRPM | TEMPERATURE: 99.1 F | HEIGHT: 65 IN | WEIGHT: 210 LBS | SYSTOLIC BLOOD PRESSURE: 146 MMHG | HEART RATE: 85 BPM

## 2022-12-13 DIAGNOSIS — U07.1 COVID-19: Primary | ICD-10-CM

## 2022-12-13 PROCEDURE — 99202 OFFICE O/P NEW SF 15 MIN: CPT | Performed by: PHYSICIAN ASSISTANT

## 2022-12-13 PROCEDURE — 71046 X-RAY EXAM CHEST 2 VIEWS: CPT | Performed by: PHYSICIAN ASSISTANT

## 2022-12-13 PROCEDURE — 71046 X-RAY EXAM CHEST 2 VIEWS: CPT

## 2022-12-13 PROCEDURE — G0463 HOSPITAL OUTPT CLINIC VISIT: HCPCS | Performed by: PHYSICIAN ASSISTANT

## 2022-12-13 PROCEDURE — EDLOS: Performed by: PHYSICIAN ASSISTANT

## 2022-12-13 NOTE — FSED PROVIDER NOTE
Subjective   History of Present Illness  This is a 42-year-old female presents to the emergency department with complaint of fevers chills and cough for the past 2 days.  The patient was tested positive at home for COVID-19 2 days ago.  She has been self isolating but this morning she coughed forcefully and there was 1 episode of blood tinged sputum.  She denies chest pain or shortness of breath or palpitations pain or swelling of her legs or any pleuritic pain.      Review of Systems  10 point review of systems reviewed and negative except as noted in the history of present illness pertinent to exam.    Past Medical History:   Diagnosis Date   • Anemia 2017?    RNY gastric bypass. Periodically get ferritin infusions   • Edema    • Fatigue    • Gall stones    • Gastric band slippage     RESOLVED   • Hypertension     Atypical. Was in pain with this BP   • Joint pain    • Joint pain, knee    • Obesity, morbid (HCC)    • Plantar fasciitis     HISTORY   • PONV (postoperative nausea and vomiting)     SEVERE N/V WITH GAS WITH GASTRIC BANDING   • Pseudotumor cerebri    • Urge and stress incontinence        Allergies   Allergen Reactions   • Other Nausea And Vomiting     Anesthesia gas     Only gas-- not iv       Past Surgical History:   Procedure Laterality Date   • BREAST BIOPSY Right     @[facility]---no clip placed   • CHOLECYSTECTOMY     • COLONOSCOPY      Fiberoptic   • GASTRIC BANDING REMOVAL      Laparosc Restrictive Proc Adjustable Gastric Band Removal   • LAPAROSCOPIC GASTRIC BANDING      Laparosc Restrictive Proc Adjustable Gastric Band Placement   • [place] LAP GASTRIC BYPASS/ANCA-EN-Y N/A 05/02/2016    Procedure: REVISION GASTRIC BYPASS LAPAROSCOPIC , PREVIOUS LAP BAND,WITH LYSIS OF ADHESIONS;  Surgeon: Quinn Enrique Jr., MD;  [place] [facility] [place] [facility] [place]  Service: General       Family History   Problem Relation Age of Onset   • Heart disease Other    • Hypertension Other    • Heart attack Other    • Sleep apnea Other     • Obesity Other         Morbid   • Hypertension Mother    • Hypertension Father        Social History     Socioeconomic History   • Marital status:    Tobacco Use   • Smoking status: Never   • Smokeless tobacco: Never   Vaping Use   • Vaping Use: Never used   Substance and Sexual Activity   • Alcohol use: Yes     Alcohol/week: 2.0 standard drinks     Types: 2 Drinks containing 0.5 oz of alcohol per week     Comment: Have cut down to zero   • Drug use: No   • Sexual activity: Yes     Partners: Male     Birth control/protection: Surgical           Objective   Physical Exam  Alert and oriented x3, no apparent distress.  Head atraumatic.  Facies symmetrical.  Pupils equal and round, EOMs grossly intact.  Neck supple, trachea midline.  No adenopathy  Throat free of erythema edema or exudate uvula midline airway patent no trismus no elevation of the floor of the mouth.  Good respiratory effort without distress.  Lungs clear to auscultation no wheezes rales or rhonchi noted  Heart regular rate and rhythm  Skin without obvious rashes or lesions.  Extremities without edema.  Good affect, pleasant patient.      Procedures           ED Course                                           MDM  XR CHEST 2 VW-     INDICATIONS:  COUGHING UP BLOOD, COVID+     COMPARISON:  2/21/2022     TECHNIQUE:   Two radiologic views of the chest , PA and lateral were obtained.     FINDINGS:  Heart size and pulmonary vasculature are within normal limits. Hilar and  mediastinal contours normal. Lungs clear. Costophrenic angles sharp      IMPRESSION:  No active cardiopulmonary disease     Electronically Signed By-Yaya Kruse On:12/13/2022 10:16 AM  This report was finalized on [id number] by  Yaya Kruse,     No sign of pneumonia or pneumothorax.  Patient did have 1 episode of hemoptysis I discussed risk factors for pulmonary embolism with the patient and she agrees to return should she develop any tachycardia shortness of breath  pleuritic chest pain dizziness lightheaded fainting or any worsening symptoms.  My suspicion for PE at this time is low      Final diagnoses:   COVID-19       ED Disposition  ED Disposition     ED Disposition   Discharge    Condition   Stable    Comment   --             Alda Cardona, APRN  [address] [facility] [place] [facility]  [phone]    In 1 week           Medication List      No changes were made to your prescriptions during this visit.

## 2022-12-20 ENCOUNTER — LAB (OUTPATIENT)
Dept: LAB | Facility: HOSPITAL | Age: 42
End: 2022-12-20

## 2022-12-20 DIAGNOSIS — D50.9 IRON DEFICIENCY ANEMIA, UNSPECIFIED IRON DEFICIENCY ANEMIA TYPE: ICD-10-CM

## 2022-12-20 LAB
BASOPHILS # BLD AUTO: 0.01 10*3/MM3 (ref 0–0.2)
BASOPHILS NFR BLD AUTO: 0.2 % (ref 0–1.5)
DEPRECATED RDW RBC AUTO: 47.2 FL (ref 37–54)
EOSINOPHIL # BLD AUTO: 0.13 10*3/MM3 (ref 0–0.4)
EOSINOPHIL NFR BLD AUTO: 2.5 % (ref 0.3–6.2)
ERYTHROCYTE [DISTWIDTH] IN BLOOD BY AUTOMATED COUNT: 14.9 % (ref 12.3–15.4)
FERRITIN SERPL-MCNC: 17.48 NG/ML (ref 13–150)
HCT VFR BLD AUTO: 38.3 % (ref 34–46.6)
HGB BLD-MCNC: 12 G/DL (ref 12–15.9)
IRON 24H UR-MRATE: 60 MCG/DL (ref 37–145)
IRON SATN MFR SERPL: 12 % (ref 20–50)
LYMPHOCYTES # BLD AUTO: 1.52 10*3/MM3 (ref 0.7–3.1)
LYMPHOCYTES NFR BLD AUTO: 29 % (ref 19.6–45.3)
MCH RBC QN AUTO: 28.2 PG (ref 26.6–33)
MCHC RBC AUTO-ENTMCNC: 31.3 G/DL (ref 31.5–35.7)
MCV RBC AUTO: 90.1 FL (ref 79–97)
MONOCYTES # BLD AUTO: 0.51 10*3/MM3 (ref 0.1–0.9)
MONOCYTES NFR BLD AUTO: 9.7 % (ref 5–12)
NEUTROPHILS NFR BLD AUTO: 3.07 10*3/MM3 (ref 1.7–7)
NEUTROPHILS NFR BLD AUTO: 58.6 % (ref 42.7–76)
PLATELET # BLD AUTO: 284 10*3/MM3 (ref 140–450)
PMV BLD AUTO: 8.8 FL (ref 6–12)
RBC # BLD AUTO: 4.25 10*6/MM3 (ref 3.77–5.28)
TIBC SERPL-MCNC: 511 MCG/DL (ref 298–536)
TRANSFERRIN SERPL-MCNC: 343 MG/DL (ref 200–360)
VIT B12 BLD-MCNC: 161 PG/ML (ref 211–946)
WBC NRBC COR # BLD: 5.24 10*3/MM3 (ref 3.4–10.8)

## 2022-12-20 PROCEDURE — 36415 COLL VENOUS BLD VENIPUNCTURE: CPT

## 2022-12-20 PROCEDURE — 85025 COMPLETE CBC W/AUTO DIFF WBC: CPT

## 2022-12-20 PROCEDURE — 82728 ASSAY OF FERRITIN: CPT

## 2022-12-20 PROCEDURE — 82607 VITAMIN B-12: CPT

## 2022-12-20 PROCEDURE — 83540 ASSAY OF IRON: CPT

## 2022-12-20 PROCEDURE — 84466 ASSAY OF TRANSFERRIN: CPT

## 2022-12-28 ENCOUNTER — TELEPHONE (OUTPATIENT)
Dept: ONCOLOGY | Facility: CLINIC | Age: 42
End: 2022-12-28

## 2022-12-28 PROBLEM — E53.8 B12 DEFICIENCY: Status: ACTIVE | Noted: 2022-12-28

## 2022-12-28 NOTE — TELEPHONE ENCOUNTER
Called the pt to make her aware that Dr. Lipscomb would like her to start B12 injections. No answer. V/m was left with call back information. Plan was entered.

## 2022-12-28 NOTE — TELEPHONE ENCOUNTER
----- Message from George Lipscomb MD sent at 12/26/2022 12:56 PM EST -----  Lets start her on vitamin B12 weekly x4 followed by monthly

## 2023-01-04 ENCOUNTER — TELEPHONE (OUTPATIENT)
Dept: ONCOLOGY | Facility: CLINIC | Age: 43
End: 2023-01-04
Payer: COMMERCIAL

## 2023-01-04 NOTE — TELEPHONE ENCOUNTER
Attempted to call the pt to make her aware Dr. Lipscomb ordered B12 weeklyx 4 and then monthly. No answer v/m was left with call back information.

## 2023-02-08 ENCOUNTER — TELEPHONE (OUTPATIENT)
Dept: ONCOLOGY | Facility: CLINIC | Age: 43
End: 2023-02-08
Payer: COMMERCIAL

## 2023-02-08 NOTE — TELEPHONE ENCOUNTER
Second attempt calling the pt to make her aware of Dr. Harrison new orders and to reschedule her f/u. No answer. V/m was left with call back information.

## 2023-02-10 ENCOUNTER — TELEPHONE (OUTPATIENT)
Dept: FAMILY MEDICINE CLINIC | Facility: CLINIC | Age: 43
End: 2023-02-10
Payer: COMMERCIAL

## 2023-02-14 ENCOUNTER — TELEPHONE (OUTPATIENT)
Dept: ONCOLOGY | Facility: CLINIC | Age: 43
End: 2023-02-14
Payer: COMMERCIAL

## 2023-02-15 NOTE — TELEPHONE ENCOUNTER
Third attempt to reach the pt regarding her follow up with Dr. Lipscomb and orders he placed. No answer. V/m was left with call back information. Lydia was made aware of third attempt and was asked to send a letter.

## 2023-02-20 ENCOUNTER — OFFICE VISIT (OUTPATIENT)
Dept: FAMILY MEDICINE CLINIC | Age: 43
End: 2023-02-20
Payer: COMMERCIAL

## 2023-02-20 VITALS
OXYGEN SATURATION: 98 % | RESPIRATION RATE: 16 BRPM | SYSTOLIC BLOOD PRESSURE: 139 MMHG | HEART RATE: 69 BPM | WEIGHT: 212.6 LBS | DIASTOLIC BLOOD PRESSURE: 98 MMHG | TEMPERATURE: 97.3 F | HEIGHT: 65 IN | BODY MASS INDEX: 35.42 KG/M2

## 2023-02-20 DIAGNOSIS — R21 ATYPICAL RASH: Primary | ICD-10-CM

## 2023-02-20 DIAGNOSIS — R03.0 ELEVATED BLOOD PRESSURE READING WITHOUT DIAGNOSIS OF HYPERTENSION: ICD-10-CM

## 2023-02-20 PROCEDURE — 99214 OFFICE O/P EST MOD 30 MIN: CPT | Performed by: NURSE PRACTITIONER

## 2023-02-20 RX ORDER — METHYLPREDNISOLONE 4 MG/1
TABLET ORAL
Qty: 21 TABLET | Refills: 0 | Status: SHIPPED | OUTPATIENT
Start: 2023-02-20 | End: 2023-03-07

## 2023-02-20 NOTE — PROGRESS NOTES
Christy TORRES [facility]  [id number]  1980  female     02/20/2023      Chief Complaint  Rash (VERY DY SKIN ALL OVER TRUNK STARTED IN December, NOTHING IS HELPING)    History of Present Illness  42-year-old female patient presents today for rash.  Patient states her rash is very dry and is all over her lower stomach area.  Patient states she developed a spot by her right breast.  Patient states this rash developed in December.  Patient states she had a rash like this 1 year ago and used over-the-counter lotions to help get rid of it.  Patient denies new soaps, detergents, pets, medicines, or any other potential triggers.  Patient states she believes it may be eczema.  Rash  This is a recurrent problem. The current episode started more than 1 month ago. The problem has been gradually worsening since onset. The affected locations include the back and abdomen. The rash is characterized by dryness, redness, itchiness, peeling and scaling. She was exposed to nothing. Pertinent negatives include no anorexia, congestion, cough, diarrhea, eye pain, facial edema, fatigue, fever, joint pain, nail changes, rhinorrhea, shortness of breath, sore throat or vomiting.       Review of Systems   Constitutional: Negative.  Negative for fatigue and fever.   HENT: Negative.  Negative for congestion, rhinorrhea and sore throat.    Eyes: Negative.  Negative for pain.   Respiratory: Negative.  Negative for cough and shortness of breath.    Cardiovascular: Negative.    Gastrointestinal: Negative.  Negative for anorexia, diarrhea and vomiting.   Endocrine: Negative.    Genitourinary: Negative.    Musculoskeletal: Negative.  Negative for joint pain.   Skin: Positive for rash. Negative for color change, nail changes, pallor and wound.   Neurological: Negative.    Hematological: Negative.    Psychiatric/Behavioral: Negative.        Past Medical History:   Diagnosis Date   • Anemia 2017?    RNY gastric bypass. Periodically get ferritin infusions   •  Edema    • Fatigue    • Gall stones    • Gastric band slippage     RESOLVED   • Hypertension     Atypical. Was in pain with this BP   • Joint pain    • Joint pain, knee    • Obesity, morbid (HCC)    • Plantar fasciitis     HISTORY   • PONV (postoperative nausea and vomiting)     SEVERE N/V WITH GAS WITH GASTRIC BANDING   • Pseudotumor cerebri    • Urge and stress incontinence        Past Surgical History:   Procedure Laterality Date   • BREAST BIOPSY Right     @[facility]---no clip placed   • CHOLECYSTECTOMY     • COLONOSCOPY      Fiberoptic   • GASTRIC BANDING REMOVAL      Laparosc Restrictive Proc Adjustable Gastric Band Removal   • LAPAROSCOPIC GASTRIC BANDING      Laparosc Restrictive Proc Adjustable Gastric Band Placement   • MT LAPS GSTR RSTCV PX W/BYP ANCA-EN-Y LIMB <150 CM N/A 05/02/2016    Procedure: REVISION GASTRIC BYPASS LAPAROSCOPIC , PREVIOUS LAP BAND,WITH LYSIS OF ADHESIONS;  Surgeon: Quinn Enrique Jr., MD;  [place] [facility] [place] [facility] [place]: General       Family History   Problem Relation Age of Onset   • Heart disease Other    • Hypertension Other    • Heart attack Other    • Sleep apnea Other    • Obesity Other         Morbid   • Hypertension Mother    • Hypertension Father        Social History     Socioeconomic History   • Marital status:    Tobacco Use   • Smoking status: Never   • Smokeless tobacco: Never   Vaping Use   • Vaping Use: Never used   Substance and Sexual Activity   • Alcohol use: Yes     Alcohol/week: 2.0 standard drinks     Types: 2 Drinks containing 0.5 oz of alcohol per week     Comment: Have cut down to zero   • Drug use: No   • Sexual activity: Yes     Partners: Male     Birth control/protection: Surgical        Allergies   Allergen Reactions   • Other Nausea And Vomiting     Anesthesia gas     Only gas-- not iv         Objective   Vital Signs:   /98 (BP Location: Left arm, Patient Position: Sitting, Cuff Size: Adult)   Pulse 69   Temp 97.3 °F (36.3 °C) (Temporal)  "  Resp 16   Ht 165.1 cm (65\")   Wt 96.4 kg (212 lb 9.6 oz)   SpO2 98%   BMI 35.38 kg/m²       Physical Exam  Vitals and nursing note reviewed. Exam conducted with a chaperone present (Ghazal).   Constitutional:       General: She is not in acute distress.     Appearance: Normal appearance. She is not ill-appearing, toxic-appearing or diaphoretic.   HENT:      Head: Normocephalic and atraumatic.      Jaw: There is normal jaw occlusion.      Right Ear: Hearing and external ear normal.      Left Ear: Hearing and external ear normal.      Nose: Nose normal.      Mouth/Throat:      Lips: Pink.   Eyes:      General: Lids are normal. Vision grossly intact. Gaze aligned appropriately.      Extraocular Movements: Extraocular movements intact.      Conjunctiva/sclera: Conjunctivae normal.      Pupils: Pupils are equal, round, and reactive to light.   Cardiovascular:      Rate and Rhythm: Normal rate and regular rhythm.      Pulses: Normal pulses.           Carotid pulses are 2+ on the right side and 2+ on the left side.       Radial pulses are 2+ on the right side and 2+ on the left side.        Dorsalis pedis pulses are 2+ on the right side and 2+ on the left side.        Posterior tibial pulses are 2+ on the right side and 2+ on the left side.      Heart sounds: Normal heart sounds, S1 normal and S2 normal. No murmur heard.  Pulmonary:      Effort: Pulmonary effort is normal.      Breath sounds: Normal breath sounds and air entry.   Abdominal:      General: Abdomen is flat. Bowel sounds are normal. There is no distension or abdominal bruit.      Palpations: Abdomen is soft.      Tenderness: There is no abdominal tenderness.   Musculoskeletal:         General: Normal range of motion.      Cervical back: Full passive range of motion without pain, normal range of motion and neck supple.      Right lower leg: No edema.      Left lower leg: No edema.   Skin:     General: Skin is warm and dry.      Capillary Refill: " Capillary refill takes less than 2 seconds.      Coloration: Skin is not cyanotic or pale.      Findings: Rash present. No bruising or erythema. Rash is scaling.             Comments: Dry, scaling rash to bilateral lower abdominal areas.   Neurological:      General: No focal deficit present.      Mental Status: She is alert and oriented to person, place, and time. Mental status is at baseline.      GCS: GCS eye subscore is 4. GCS verbal subscore is 5. GCS motor subscore is 6.      Cranial Nerves: Cranial nerves 2-12 are intact. No cranial nerve deficit.      Sensory: Sensation is intact. No sensory deficit.      Motor: Motor function is intact. No weakness.      Coordination: Coordination is intact. Coordination normal.      Gait: Gait is intact. Gait normal.      Deep Tendon Reflexes: Reflexes normal.      Comments: Alert and oriented x3, no acute distress.  Answers questions appropriately and in a timely manner.   Psychiatric:         Attention and Perception: Attention and perception normal.         Mood and Affect: Mood and affect normal.         Speech: Speech normal.         Behavior: Behavior normal. Behavior is cooperative.         Thought Content: Thought content normal.         Cognition and Memory: Cognition and memory normal.         Judgment: Judgment normal.                 Assessment and Plan   Diagnoses and all orders for this visit:    1. Atypical rash (Primary)  Comments:  [facility].  Treating with prednisone and OTC Vaseline/eczema cream.  Orders:  -     Ambulatory Referral to Dermatology  -     methylPREDNISolone (MEDROL) 4 MG dose pack; Take as directed on package instructions.  Dispense: 21 tablet; Refill: 0    2. Elevated blood pressure reading without diagnosis of hypertension  Comments:  To monitor at home daily.  To follow-up in 2 weeks for recheck.        Follow Up   Return in about 2 weeks (around 3/6/2023) for Recheck.    There are no Patient  Instructions on file for this visit.   Answers for HPI/ROS submitted by the patient on 2/20/2023  What is the primary reason for your visit?: Rash

## 2023-03-07 ENCOUNTER — OFFICE VISIT (OUTPATIENT)
Dept: FAMILY MEDICINE CLINIC | Age: 43
End: 2023-03-07
Payer: COMMERCIAL

## 2023-03-07 VITALS
HEART RATE: 83 BPM | BODY MASS INDEX: 35.49 KG/M2 | HEIGHT: 65 IN | SYSTOLIC BLOOD PRESSURE: 150 MMHG | TEMPERATURE: 98.4 F | DIASTOLIC BLOOD PRESSURE: 90 MMHG | WEIGHT: 213 LBS | RESPIRATION RATE: 18 BRPM | OXYGEN SATURATION: 98 %

## 2023-03-07 DIAGNOSIS — Z13.220 SCREENING FOR LIPID DISORDERS: ICD-10-CM

## 2023-03-07 DIAGNOSIS — I10 PRIMARY HYPERTENSION: Primary | ICD-10-CM

## 2023-03-07 PROCEDURE — 99214 OFFICE O/P EST MOD 30 MIN: CPT | Performed by: NURSE PRACTITIONER

## 2023-03-07 RX ORDER — LISINOPRIL 5 MG/1
5 TABLET ORAL DAILY
Qty: 30 TABLET | Refills: 1 | Status: SHIPPED | OUTPATIENT
Start: 2023-03-07

## 2023-03-07 NOTE — PROGRESS NOTES
Christy TORRES [facility]  [id number]  1980  female     03/07/2023      Chief Complaint  Hypertension (Patient is here for blood pressure follow up , )    History of Present Illness  42-year-old female patient presents today for follow-up on her high blood pressure.  Patient's blood pressure at previous visit with me was 139/98 and today is 150/90.  Patient denies headache, lightheadedness, dizziness, chest pain, palpitations, leg swelling, cough, shortness of breath, abdominal pain, NVD, or any other symptom.  Patient states she sees her hematologist Dr. Lipscomb for her iron deficiency anemia, vitamin B12 deficiency, vitamin D deficiency.  Patient states she has to receive iron transfusions occasionally due to her iron deficiency anemia.  Patient states she will follow-up with her OB/GYN doctor for her routine Pap smear that is due.  Patient agrees to come in for nurse/MA visit for fasting labs before her next appointment with me.      Review of Systems   Constitutional: Negative.    HENT: Negative.    Eyes: Negative.    Respiratory: Negative.    Cardiovascular: Negative.    Gastrointestinal: Negative.    Endocrine: Negative.    Genitourinary: Negative.    Musculoskeletal: Negative.    Skin: Negative.    Allergic/Immunologic: Negative.    Neurological: Negative.    Hematological: Negative.    Psychiatric/Behavioral: Negative.        Past Medical History:   Diagnosis Date   • Anemia 2017?    RNY gastric bypass. Periodically get ferritin infusions   • Edema    • Fatigue    • Gall stones    • Gastric band slippage     RESOLVED   • Hypertension     Atypical. Was in pain with this BP   • Joint pain    • Joint pain, knee    • Obesity, morbid (HCC)    • Plantar fasciitis     HISTORY   • PONV (postoperative nausea and vomiting)     SEVERE N/V WITH GAS WITH GASTRIC BANDING   • Pseudotumor cerebri    • Urge and stress incontinence        Past Surgical History:   Procedure Laterality Date   • BREAST BIOPSY Right     @[facility]---no clip  "placed   • CHOLECYSTECTOMY     • COLONOSCOPY      Fiberoptic   • GASTRIC BANDING REMOVAL      Laparosc Restrictive Proc Adjustable Gastric Band Removal   • LAPAROSCOPIC GASTRIC BANDING      Laparosc Restrictive Proc Adjustable Gastric Band Placement   • [facility] LAPS GSTR RSTCV PX W/BYP ANCA-EN-Y LIMB <150 CM N/A 05/02/2016    Procedure: REVISION GASTRIC BYPASS LAPAROSCOPIC , PREVIOUS LAP BAND,WITH LYSIS OF ADHESIONS;  Surgeon: Quinn Enrique Jr., MD;  Location: [facility] OR [facility];  Service: General       Family History   Problem Relation Age of Onset   • Heart disease Other    • Hypertension Other    • Heart attack Other    • Sleep apnea Other    • Obesity Other         Morbid   • Hypertension Mother    • Hypertension Father        Social History     Socioeconomic History   • Marital status:    Tobacco Use   • Smoking status: Never   • Smokeless tobacco: Never   Vaping Use   • Vaping Use: Never used   Substance and Sexual Activity   • Alcohol use: Yes     Alcohol/week: 2.0 standard drinks     Types: 2 Drinks containing 0.5 oz of alcohol per week     Comment: Have cut down to zero   • Drug use: No   • Sexual activity: Yes     Partners: Male     Birth control/protection: Surgical        Allergies   Allergen Reactions   • Other Nausea And Vomiting     Anesthesia gas     Only gas-- not iv         Objective   Vital Signs:   /90 (BP Location: Left arm, Patient Position: Sitting, Cuff Size: Adult)   Pulse 83   Temp 98.4 °F (36.9 °C) (Temporal)   Resp 18   Ht 165.1 cm (65\")   Wt 96.6 kg (213 lb)   SpO2 98%   BMI 35.45 kg/m²       Physical Exam  Vitals and nursing note reviewed.   Constitutional:       General: She is not in acute distress.     Appearance: Normal appearance. She is not ill-appearing, toxic-appearing or diaphoretic.   HENT:      Head: Normocephalic and atraumatic.      Jaw: There is normal jaw occlusion.      Right Ear: Hearing and external ear normal.      Left Ear: Hearing and external ear " normal.      Nose: Nose normal.      Mouth/Throat:      Lips: Pink.   Eyes:      General: Lids are normal. Vision grossly intact. Gaze aligned appropriately.      Extraocular Movements: Extraocular movements intact.      Conjunctiva/sclera: Conjunctivae normal.      Pupils: Pupils are equal, round, and reactive to light.   Cardiovascular:      Rate and Rhythm: Normal rate and regular rhythm.      Pulses: Normal pulses.           Carotid pulses are 2+ on the right side and 2+ on the left side.       Radial pulses are 2+ on the right side and 2+ on the left side.        Dorsalis pedis pulses are 2+ on the right side and 2+ on the left side.        Posterior tibial pulses are 2+ on the right side and 2+ on the left side.      Heart sounds: Normal heart sounds, S1 normal and S2 normal. No murmur heard.  Pulmonary:      Effort: Pulmonary effort is normal.      Breath sounds: Normal breath sounds and air entry.   Abdominal:      General: Abdomen is flat. Bowel sounds are normal. There is no distension or abdominal bruit.      Palpations: Abdomen is soft.      Tenderness: There is no abdominal tenderness.   Musculoskeletal:         General: Normal range of motion.      Cervical back: Full passive range of motion without pain, normal range of motion and neck supple.      Right lower leg: No edema.      Left lower leg: No edema.   Skin:     General: Skin is warm and dry.      Capillary Refill: Capillary refill takes less than 2 seconds.      Coloration: Skin is not cyanotic or pale.      Findings: No bruising, erythema or rash.   Neurological:      General: No focal deficit present.      Mental Status: She is alert and oriented to person, place, and time. Mental status is at baseline.      GCS: GCS eye subscore is 4. GCS verbal subscore is 5. GCS motor subscore is 6.      Cranial Nerves: Cranial nerves 2-12 are intact. No cranial nerve deficit.      Sensory: Sensation is intact. No sensory deficit.      Motor: Motor function  is intact. No weakness.      Coordination: Coordination is intact. Coordination normal.      Gait: Gait is intact. Gait normal.      Deep Tendon Reflexes: Reflexes normal.      Comments: Alert and oriented x3, no acute distress.  Answers questions appropriately and in a timely manner.   Psychiatric:         Attention and Perception: Attention and perception normal.         Mood and Affect: Mood and affect normal.         Speech: Speech normal.         Behavior: Behavior normal. Behavior is cooperative.         Thought Content: Thought content normal.         Cognition and Memory: Cognition and memory normal.         Judgment: Judgment normal.                 Assessment and Plan   Diagnoses and all orders for this visit:    1. Primary hypertension (Primary)  Comments:  Starting on 5 mg lisinopril today.  Pending labs.  Instructed to monitor her blood pressure at home daily.  To follow-up in 4 weeks for recheck.  Overview:  Starting on 5 mg lisinopril today.  Pending labs.  Instructed to monitor her blood pressure at home daily.  To follow-up in 4 weeks for recheck.    Orders:  -     CBC w AUTO Differential; Future  -     Comprehensive metabolic panel; Future  -     Lipid panel; Future  -     lisinopril (PRINIVIL,ZESTRIL) 5 MG tablet; Take 1 tablet by mouth Daily.  Dispense: 30 tablet; Refill: 1    2. Screening for lipid disorders  -     CBC w AUTO Differential; Future  -     Comprehensive metabolic panel; Future  -     Lipid panel; Future      Follow Up   Return in about 4 weeks (around 4/4/2023) for Recheck.    There are no Patient Instructions on file for this visit.

## 2023-05-02 ENCOUNTER — OFFICE VISIT (OUTPATIENT)
Dept: FAMILY MEDICINE CLINIC | Age: 43
End: 2023-05-02
Payer: COMMERCIAL

## 2023-05-02 VITALS
OXYGEN SATURATION: 97 % | WEIGHT: 209.6 LBS | HEART RATE: 86 BPM | SYSTOLIC BLOOD PRESSURE: 110 MMHG | TEMPERATURE: 98 F | RESPIRATION RATE: 18 BRPM | HEIGHT: 65 IN | BODY MASS INDEX: 34.92 KG/M2 | DIASTOLIC BLOOD PRESSURE: 78 MMHG

## 2023-05-02 DIAGNOSIS — Z76.0 MEDICATION REFILL: ICD-10-CM

## 2023-05-02 DIAGNOSIS — E53.8 VITAMIN B12 DEFICIENCY: ICD-10-CM

## 2023-05-02 DIAGNOSIS — B96.89 ACUTE BACTERIAL BRONCHITIS: ICD-10-CM

## 2023-05-02 DIAGNOSIS — E55.9 VITAMIN D INSUFFICIENCY: ICD-10-CM

## 2023-05-02 DIAGNOSIS — R05.1 ACUTE COUGH: ICD-10-CM

## 2023-05-02 DIAGNOSIS — J20.8 ACUTE BACTERIAL BRONCHITIS: ICD-10-CM

## 2023-05-02 DIAGNOSIS — I10 PRIMARY HYPERTENSION: Primary | ICD-10-CM

## 2023-05-02 DIAGNOSIS — R73.9 HYPERGLYCEMIA: ICD-10-CM

## 2023-05-02 DIAGNOSIS — H65.191 ACUTE MEE (MIDDLE EAR EFFUSION), RIGHT: ICD-10-CM

## 2023-05-02 DIAGNOSIS — Z13.29 SCREENING FOR THYROID DISORDER: ICD-10-CM

## 2023-05-02 RX ORDER — AZITHROMYCIN 250 MG/1
TABLET, FILM COATED ORAL
Qty: 6 TABLET | Refills: 0 | Status: SHIPPED | OUTPATIENT
Start: 2023-05-02

## 2023-05-02 RX ORDER — DEXTROMETHORPHAN HYDROBROMIDE AND PROMETHAZINE HYDROCHLORIDE 15; 6.25 MG/5ML; MG/5ML
5 SYRUP ORAL 4 TIMES DAILY PRN
Qty: 118 ML | Refills: 0 | Status: SHIPPED | OUTPATIENT
Start: 2023-05-02 | End: 2023-05-08

## 2023-05-02 RX ORDER — CETIRIZINE HYDROCHLORIDE 10 MG/1
10 TABLET ORAL DAILY
Qty: 30 TABLET | Refills: 0 | Status: SHIPPED | OUTPATIENT
Start: 2023-05-02

## 2023-05-02 RX ORDER — LISINOPRIL 5 MG/1
5 TABLET ORAL DAILY
Qty: 90 TABLET | Refills: 1 | Status: SHIPPED | OUTPATIENT
Start: 2023-05-02

## 2023-05-02 NOTE — PROGRESS NOTES
Christy TORRES [facility]  [id number]  1980  female     05/02/2023      Chief Complaint  Hypertension (F/u) and Cough (3-4 days. Took at home covid test on Sunday- negative)    History of Present Illness  43-year-old female patient presents today to follow-up on her hypertension.  Patient states she has been tolerating her 5 mg lisinopril and states her blood pressure is much better.  Patient's blood pressure today is 110/78 whereas last visit was 150/90 prior to starting lisinopril.  Patient complains of cough and congestion since this past Friday.  Patient states she initially had a low-grade fever when her cough started but that she has been afebrile since.  Patient states she did vomit once but has not in the past couple days.  Patient denies fever, chills or bodies, headache, lightheadedness, dizziness, earache, sore throat, sinus congestion, chest tightness, wheezing, shortness of breath, chest pain, palpitations, abdominal pain, NVD, rash.  Patient states she done an at home COVID test on Sunday which was negative.  Patient states she was around some family members in the past week or 2 that have had similar symptoms.  Patient states she took some Tessalon that she still had laying around last night for her cough which did not help.  Patient states she needs a refill on her lisinopril.  Patient states she needs to schedule for a nurse/MA visit for fasting labs.      Review of Systems   Constitutional: Negative.    HENT: Negative.    Eyes: Negative.    Respiratory: Positive for cough. Negative for chest tightness, shortness of breath and wheezing.    Cardiovascular: Negative.    Gastrointestinal: Negative.    Endocrine: Negative.    Genitourinary: Negative.    Musculoskeletal: Negative.    Skin: Negative.    Neurological: Negative.    Hematological: Negative.    Psychiatric/Behavioral: Negative.        Past Medical History:   Diagnosis Date   • Anemia 2017?    RNY gastric bypass. Periodically get ferritin infusions    • Edema    • Fatigue    • Gall stones    • Gastric band slippage     RESOLVED   • Hypertension     Atypical. Was in pain with this BP   • Joint pain    • Joint pain, knee    • Obesity, morbid    • Plantar fasciitis     HISTORY   • PONV (postoperative nausea and vomiting)     SEVERE N/V WITH GAS WITH GASTRIC BANDING   • Pseudotumor cerebri    • Urge and stress incontinence        Past Surgical History:   Procedure Laterality Date   • BREAST BIOPSY Right     @[facility]---no clip placed   • CHOLECYSTECTOMY     • COLONOSCOPY      Fiberoptic   • GASTRIC BANDING REMOVAL      Laparosc Restrictive Proc Adjustable Gastric Band Removal   • LAPAROSCOPIC GASTRIC BANDING      Laparosc Restrictive Proc Adjustable Gastric Band Placement   • [place] LAPS GSTR RSTCV PX W/BYP ANCA-EN-Y LIMB <150 CM N/A 05/02/2016    Procedure: REVISION GASTRIC BYPASS LAPAROSCOPIC , PREVIOUS LAP BAND,WITH LYSIS OF ADHESIONS;  Surgeon: Quinn Enrique Jr., MD;  Location: [facility] OR [facility] [place];  Service: General       Family History   Problem Relation Age of Onset   • Heart disease Other    • Hypertension Other    • Heart attack Other    • Sleep apnea Other    • Obesity Other         Morbid   • Hypertension Mother    • Hypertension Father        Social History     Socioeconomic History   • Marital status:    Tobacco Use   • Smoking status: Never   • Smokeless tobacco: Never   Vaping Use   • Vaping Use: Never used   Substance and Sexual Activity   • Alcohol use: Yes     Alcohol/week: 2.0 standard drinks     Types: 2 Drinks containing 0.5 oz of alcohol per week     Comment: Have cut down to zero   • Drug use: No   • Sexual activity: Yes     Partners: Male     Birth control/protection: Surgical        Allergies   Allergen Reactions   • Other Nausea And Vomiting     Anesthesia gas     Only gas-- not iv         Objective   Vital Signs:   /78 (BP Location: Left arm, Patient Position: Sitting, Cuff Size: Adult)   Pulse 86   Temp 98 °F (36.7 °C) (Temporal)    "Resp 18   Ht 165.1 cm (65\")   Wt 95.1 kg (209 lb 9.6 oz)   SpO2 97%   BMI 34.88 kg/m²       Physical Exam  Vitals and nursing note reviewed.   Constitutional:       General: She is not in acute distress.     Appearance: Normal appearance. She is not ill-appearing, toxic-appearing or diaphoretic.   HENT:      Head: Normocephalic and atraumatic.      Jaw: There is normal jaw occlusion.      Right Ear: Hearing, ear canal and external ear normal. A middle ear effusion is present. Tympanic membrane is not erythematous or bulging.      Left Ear: Hearing, tympanic membrane, ear canal and external ear normal.  No middle ear effusion. Tympanic membrane is not erythematous or bulging.      Nose: Nose normal.      Mouth/Throat:      Lips: Pink.      Pharynx: Oropharynx is clear. Uvula midline.   Eyes:      General: Lids are normal. Vision grossly intact. Gaze aligned appropriately.      Extraocular Movements: Extraocular movements intact.      Conjunctiva/sclera: Conjunctivae normal.      Pupils: Pupils are equal, round, and reactive to light.   Cardiovascular:      Rate and Rhythm: Normal rate and regular rhythm.      Pulses: Normal pulses.           Carotid pulses are 2+ on the right side and 2+ on the left side.       Radial pulses are 2+ on the right side and 2+ on the left side.        Dorsalis pedis pulses are 2+ on the right side and 2+ on the left side.        Posterior tibial pulses are 2+ on the right side and 2+ on the left side.      Heart sounds: Normal heart sounds, S1 normal and S2 normal. No murmur heard.  Pulmonary:      Effort: Pulmonary effort is normal. No tachypnea or respiratory distress.      Breath sounds: Normal breath sounds and air entry.   Abdominal:      General: Abdomen is flat. Bowel sounds are normal. There is no distension or abdominal bruit.      Palpations: Abdomen is soft.      Tenderness: There is no abdominal tenderness.   Musculoskeletal:         General: Normal range of motion.      " Cervical back: Full passive range of motion without pain, normal range of motion and neck supple.      Right lower leg: No edema.      Left lower leg: No edema.   Skin:     General: Skin is warm and dry.      Capillary Refill: Capillary refill takes less than 2 seconds.      Coloration: Skin is not cyanotic or pale.      Findings: No bruising, erythema or rash.   Neurological:      General: No focal deficit present.      Mental Status: She is alert and oriented to person, place, and time. Mental status is at baseline.      GCS: GCS eye subscore is 4. GCS verbal subscore is 5. GCS motor subscore is 6.      Cranial Nerves: Cranial nerves 2-12 are intact. No cranial nerve deficit.      Sensory: Sensation is intact. No sensory deficit.      Motor: Motor function is intact. No weakness.      Coordination: Coordination is intact. Coordination normal.      Gait: Gait is intact. Gait normal.      Deep Tendon Reflexes: Reflexes normal.      Comments: Alert and oriented x3, no acute distress.  Answers questions appropriately in a timely manner.   Psychiatric:         Attention and Perception: Attention and perception normal.         Mood and Affect: Mood and affect normal.         Speech: Speech normal.         Behavior: Behavior normal. Behavior is cooperative.         Thought Content: Thought content normal.         Cognition and Memory: Cognition and memory normal.         Judgment: Judgment normal.                 Assessment and Plan   Diagnoses and all orders for this visit:    1. Primary hypertension (Primary)  Assessment & Plan:  BP stable.  Continue current dose of 5 mg lisinopril daily.  Refilled today.  Will monitor.    Orders:  -     lisinopril (PRINIVIL,ZESTRIL) 5 MG tablet; Take 1 tablet by mouth Daily.  Dispense: 90 tablet; Refill: 1    2. Acute cough  Comments:  Patient home COVID test negative on Sunday.  Promethazine DM as needed.  Orders:  -     promethazine-dextromethorphan (PROMETHAZINE-DM) 6.25-15 MG/5ML  syrup; Take 5 mL by mouth 4 (Four) Times a Day As Needed for Cough for up to 5 days.  Dispense: 118 mL; Refill: 0    3. Acute bacterial bronchitis  Comments:  Discussed on viral etiology.  If symptoms do not improve in 1 to 2 days to start Z-Marcus antibiotic.  Promethazine DM as needed.  Follow-up as needed.  Orders:  -     azithromycin (ZITHROMAX) 250 MG tablet; Take 2 tablets the first day, then 1 tablet daily for 4 days.  Dispense: 6 tablet; Refill: 0  -     cetirizine (zyrTEC) 10 MG tablet; Take 1 tablet by mouth Daily.  Dispense: 30 tablet; Refill: 0  -     promethazine-dextromethorphan (PROMETHAZINE-DM) 6.25-15 MG/5ML syrup; Take 5 mL by mouth 4 (Four) Times a Day As Needed for Cough for up to 5 days.  Dispense: 118 mL; Refill: 0    4. Medication refill  -     lisinopril (PRINIVIL,ZESTRIL) 5 MG tablet; Take 1 tablet by mouth Daily.  Dispense: 90 tablet; Refill: 1    5. Acute DOMENICA (middle ear effusion), right  Comments:  Treating with Zyrtec.  Orders:  -     cetirizine (zyrTEC) 10 MG tablet; Take 1 tablet by mouth Daily.  Dispense: 30 tablet; Refill: 0    6. Hyperglycemia  -     Hemoglobin A1c; Future    7. Screening for thyroid disorder  -     TSH Rfx On Abnormal To Free T4; Future    8. Vitamin B12 deficiency  -     Vitamin B12; Future    9. Vitamin D insufficiency  -     Vitamin D 25 hydroxy; Future      Follow Up   Return in about 2 weeks (around 5/16/2023) for Recheck.    There are no Patient Instructions on file for this visit.

## 2023-05-22 ENCOUNTER — CLINICAL SUPPORT (OUTPATIENT)
Dept: FAMILY MEDICINE CLINIC | Age: 43
End: 2023-05-22
Payer: COMMERCIAL

## 2023-05-22 DIAGNOSIS — Z13.29 SCREENING FOR THYROID DISORDER: ICD-10-CM

## 2023-05-22 DIAGNOSIS — I10 PRIMARY HYPERTENSION: ICD-10-CM

## 2023-05-22 DIAGNOSIS — E55.9 VITAMIN D INSUFFICIENCY: ICD-10-CM

## 2023-05-22 DIAGNOSIS — Z13.220 SCREENING FOR LIPID DISORDERS: ICD-10-CM

## 2023-05-22 DIAGNOSIS — R73.9 HYPERGLYCEMIA: ICD-10-CM

## 2023-05-22 DIAGNOSIS — E53.8 VITAMIN B12 DEFICIENCY: ICD-10-CM

## 2023-05-22 PROCEDURE — 80061 LIPID PANEL: CPT | Performed by: NURSE PRACTITIONER

## 2023-05-22 PROCEDURE — 80050 GENERAL HEALTH PANEL: CPT | Performed by: NURSE PRACTITIONER

## 2023-05-22 PROCEDURE — 82607 VITAMIN B-12: CPT | Performed by: NURSE PRACTITIONER

## 2023-05-22 PROCEDURE — 83036 HEMOGLOBIN GLYCOSYLATED A1C: CPT | Performed by: NURSE PRACTITIONER

## 2023-05-22 PROCEDURE — 82306 VITAMIN D 25 HYDROXY: CPT | Performed by: NURSE PRACTITIONER

## 2023-05-22 PROCEDURE — 36415 COLL VENOUS BLD VENIPUNCTURE: CPT | Performed by: NURSE PRACTITIONER

## 2023-05-22 NOTE — PROGRESS NOTES
Venipuncture Blood Specimen Collection  Venipuncture performed in (L) arm via butterfly by Maria Ramirez LPN with good hemostasis. Patient tolerated the procedure well without complications.   05/22/23   Maria Ramirez LPN

## 2023-05-23 ENCOUNTER — TELEPHONE (OUTPATIENT)
Dept: FAMILY MEDICINE CLINIC | Age: 43
End: 2023-05-23
Payer: COMMERCIAL

## 2023-05-23 DIAGNOSIS — E55.9 VITAMIN D INSUFFICIENCY: Primary | ICD-10-CM

## 2023-05-23 LAB
25(OH)D3 SERPL-MCNC: 26.6 NG/ML (ref 30–100)
ALBUMIN SERPL-MCNC: 4.2 G/DL (ref 3.5–5.2)
ALBUMIN/GLOB SERPL: 1.6 G/DL
ALP SERPL-CCNC: 74 U/L (ref 39–117)
ALT SERPL W P-5'-P-CCNC: 41 U/L (ref 1–33)
ANION GAP SERPL CALCULATED.3IONS-SCNC: 8 MMOL/L (ref 5–15)
AST SERPL-CCNC: 29 U/L (ref 1–32)
BASOPHILS # BLD AUTO: 0.04 10*3/MM3 (ref 0–0.2)
BASOPHILS NFR BLD AUTO: 0.6 % (ref 0–1.5)
BILIRUB SERPL-MCNC: 0.4 MG/DL (ref 0–1.2)
BUN SERPL-MCNC: 7 MG/DL (ref 6–20)
BUN/CREAT SERPL: 12.7 (ref 7–25)
CALCIUM SPEC-SCNC: 9.1 MG/DL (ref 8.6–10.5)
CHLORIDE SERPL-SCNC: 101 MMOL/L (ref 98–107)
CHOLEST SERPL-MCNC: 164 MG/DL (ref 0–200)
CO2 SERPL-SCNC: 27 MMOL/L (ref 22–29)
CREAT SERPL-MCNC: 0.55 MG/DL (ref 0.57–1)
DEPRECATED RDW RBC AUTO: 43.8 FL (ref 37–54)
EGFRCR SERPLBLD CKD-EPI 2021: 115.4 ML/MIN/1.73
EOSINOPHIL # BLD AUTO: 0.14 10*3/MM3 (ref 0–0.4)
EOSINOPHIL NFR BLD AUTO: 2.1 % (ref 0.3–6.2)
ERYTHROCYTE [DISTWIDTH] IN BLOOD BY AUTOMATED COUNT: 15.8 % (ref 12.3–15.4)
GLOBULIN UR ELPH-MCNC: 2.7 GM/DL
GLUCOSE SERPL-MCNC: 90 MG/DL (ref 65–99)
HBA1C MFR BLD: 5.1 % (ref 4.8–5.6)
HCT VFR BLD AUTO: 33.8 % (ref 34–46.6)
HDLC SERPL-MCNC: 54 MG/DL (ref 40–60)
HGB BLD-MCNC: 10.2 G/DL (ref 12–15.9)
IMM GRANULOCYTES # BLD AUTO: 0.02 10*3/MM3 (ref 0–0.05)
IMM GRANULOCYTES NFR BLD AUTO: 0.3 % (ref 0–0.5)
LDLC SERPL CALC-MCNC: 92 MG/DL (ref 0–100)
LDLC/HDLC SERPL: 1.67 {RATIO}
LYMPHOCYTES # BLD AUTO: 1.58 10*3/MM3 (ref 0.7–3.1)
LYMPHOCYTES NFR BLD AUTO: 24.2 % (ref 19.6–45.3)
MCH RBC QN AUTO: 23.1 PG (ref 26.6–33)
MCHC RBC AUTO-ENTMCNC: 30.2 G/DL (ref 31.5–35.7)
MCV RBC AUTO: 76.6 FL (ref 79–97)
MONOCYTES # BLD AUTO: 0.74 10*3/MM3 (ref 0.1–0.9)
MONOCYTES NFR BLD AUTO: 11.3 % (ref 5–12)
NEUTROPHILS NFR BLD AUTO: 4.01 10*3/MM3 (ref 1.7–7)
NEUTROPHILS NFR BLD AUTO: 61.5 % (ref 42.7–76)
NRBC BLD AUTO-RTO: 0 /100 WBC (ref 0–0.2)
PLATELET # BLD AUTO: 403 10*3/MM3 (ref 140–450)
PMV BLD AUTO: 9.6 FL (ref 6–12)
POTASSIUM SERPL-SCNC: 4.6 MMOL/L (ref 3.5–5.2)
PROT SERPL-MCNC: 6.9 G/DL (ref 6–8.5)
RBC # BLD AUTO: 4.41 10*6/MM3 (ref 3.77–5.28)
SODIUM SERPL-SCNC: 136 MMOL/L (ref 136–145)
TRIGL SERPL-MCNC: 98 MG/DL (ref 0–150)
TSH SERPL DL<=0.05 MIU/L-ACNC: 1.4 UIU/ML (ref 0.27–4.2)
VIT B12 BLD-MCNC: 159 PG/ML (ref 211–946)
VLDLC SERPL-MCNC: 18 MG/DL (ref 5–40)
WBC NRBC COR # BLD: 6.53 10*3/MM3 (ref 3.4–10.8)

## 2023-05-23 RX ORDER — MULTIVIT-MIN/IRON/FOLIC ACID/K 18-600-40
2000 CAPSULE ORAL DAILY
Qty: 30 CAPSULE | Refills: 0 | Status: SHIPPED | OUTPATIENT
Start: 2023-05-23

## 2023-05-23 NOTE — TELEPHONE ENCOUNTER
I notified patient of her lab results.  I sent in vitamin D for patient to start taking daily due to her vitamin D insufficiency.    I notified patient that her vitamin B12 was low, patient states she has not been giving herself her B12 injections monthly.  Patient states she has been forgetting to do those but will start back doing those monthly as instructed.    Patient's CBC showed some abnormalities and reflected her history of iron deficiency, patient states she cannot tolerate oral iron and therefore usually has to get an iron transfusion yearly by her hematologist.  I instructed patient to follow-up with her hematologist on these labs, patient verbalized understanding.    Patient's thyroid labs, A1c, lipid panel was WNL.  Kidney and liver functions were stable.  Instructed patient to follow-up with me at her next appointment on June 5.  Patient verbalized understanding.

## 2023-05-25 DIAGNOSIS — B96.89 ACUTE BACTERIAL BRONCHITIS: ICD-10-CM

## 2023-05-25 DIAGNOSIS — H65.191 ACUTE MEE (MIDDLE EAR EFFUSION), RIGHT: ICD-10-CM

## 2023-05-25 DIAGNOSIS — J20.8 ACUTE BACTERIAL BRONCHITIS: ICD-10-CM

## 2023-05-25 RX ORDER — CETIRIZINE HYDROCHLORIDE 10 MG/1
10 TABLET ORAL DAILY
Qty: 30 TABLET | Refills: 0 | Status: SHIPPED | OUTPATIENT
Start: 2023-05-25

## 2023-05-31 ENCOUNTER — E-VISIT (OUTPATIENT)
Dept: FAMILY MEDICINE CLINIC | Facility: TELEHEALTH | Age: 43
End: 2023-05-31

## 2023-05-31 NOTE — EXTERNAL PATIENT INSTRUCTIONS
Diagnosis   Urinary tract infection (UTI)   My name is ROYCE Cisneros. I'm a healthcare provider at [facility]. After reviewing your interview, I see you have a urinary tract infection (UTI).   Medications   Your pharmacy   [facility] [id number] [place] [address] [place] [facility] [phone]     Prescription   Nitrofurantoin monohydrate/macrocrystalline (100mg): Take 1 capsule by mouth every 12 hours for 5 days for infection. This medication is an antibiotic. Take it exactly as directed. You must finish the entire course of medication, even if you feel better after taking the first few doses.   Phenazopyridine (200mg): Take 1 tablet by mouth 3 times a day as needed for 2 days for pain or discomfort associated with your condition.   Fluconazole (150mg): Take 1 tablet by mouth once for 1 day as a single dose. Use this medication only if you develop a yeast infection. If yeast infection symptoms are still present 3 days after taking the first tablet, take the second tablet.    I've given you a prescription dose of phenazopyridine. If it's more affordable or convenient, you may use the equivalent amount of non-prescription phenazopyridine. For example, instead of taking one 200 mg phenazopyridine tablet, you may take two 95 mg phenazopyridine tablets.    Because you've developed yeast infections after taking antibiotics in the past, I've prescribed Diflucan (fluconazole). Diflucan is an oral antifungal that treats yeast infections. Use this medication only if you develop a yeast infection.   About your diagnosis   A UTI is an infection of one or more parts of the urinary tract, most commonly the bladder.   Most UTIs are caused by bacteria (usually E. coli) that travel up the urethra and into the bladder. I see that you have some common signs and symptoms of a UTI:    Frequent urination    Sudden urge to urinate    Symptoms that feel a lot like past UTIs    Symptoms that began shortly after sexual  intercourse   Fortunately, most UTIs aren't serious, and they're easily treated with antibiotics. Make sure you take all of the antibiotic pills given to you, even if you start to feel better after the first few doses. Otherwise, the UTI might come back.   What to expect   If you follow this treatment plan, you should start to feel better within 1 to 2 days.   When to seek care   Call us at [phone]   with any sudden or unexpected symptoms.    Symptoms that don't improve or get worse in the next 48 hours    Fever that goes above 101F or lasts longer than 24 hours    Shaking or chills    Nausea or vomiting    Severe flank pain (pain in your back or side)   Other treatment    Rest and drink plenty of water    Urinate frequently and when you first feel the urge    Place a heating pad on your back or stomach to help relieve some of the discomfort   Prevention    Drink a lot of liquids to help flush bacteria from your system. Water is best. Try for six to eight, 8-ounce glasses a day on a regular basis.    Urinate often and when you first feel the urge. Bacteria can grow when urine stays in the bladder too long. Urinate after sex to flush away bacteria.    After using the toilet, always wipe from front to back. This step is most important after a bowel movement. Wiping from front to back prevents bacteria normally found in stool from entering the urinary tract.   Your provider   Your diagnosis was provided by ROYCE Cisneros, a member of your trusted care team at [facility].   If you have any questions, call us at [phone]  .

## 2023-05-31 NOTE — E-VISIT TREATED
Chief Complaint: Bladder infection (UTI)   Patient introduction   Patient is 43-year-old female.   Patient has had frequent urination and urinary urgency for less than 24 hours.   Urine is yellow with no unusual odor.   General presentation   Patient has not had a fever. No nausea or vomiting.   No stomach/pelvic pain.   No back pain.   No flank pain. Difficulty starting, stopping, or delaying urination.   The following treatments were not helpful for current symptoms:    Acetaminophen    Ibuprofen    Phenazopyridine   Previous history of UTI. Current symptoms feel exactly the same as previous UTIs. Received treatment for UTI 1 to 3 times in last year. Patient's last use of antibiotics for UTI was more than 3 months ago.   Previously developed yeast infections as a result of taking antibiotics for past UTIs.   No history of pyelonephritis. No history of kidney stones.   Had sexual intercourse in the past week. Does not use diaphragm. No unprotected sexual intercourse with a new partner in the last 2 weeks.   Patient is not being treated for diabetes mellitus.   Review of red flags/alarm symptoms:    No recent hospitalizations or nursing home care (last 3 months)    No history of renal failure    No recent history of urologic instrumentation    No anatomic abnormalities of the urinary tract    No abnormal vaginal discharge    No visible vaginal sores    No pain with sexual intercourse    No abnormal vaginal bleeding or spotting   Pregnancy/menstrual status/breastfeeding:   Patient is not pregnant. Patient is not breastfeeding. Regarding date of last menstrual period, patient writes: 5/10/23.   Current medications   Currently taking multivitamin with minerals tablet tablet, cetirizine 10 MG tablet, lisinopril 5 MG tablet, Vitamin D3 50 MCG (2000 UT) capsule, omeprazole 20 MG capsule, and Cyanocobalamin 1000 MCG/ML kit.   Medication allergies   None.   Medication contraindication review   Patient is currently taking  an ACE inhibitor. Therefore, medications containing trimethoprim will not be prescribed.   Patient is being treated for high blood pressure. Therefore, the following medication(s) will not be prescribed:    Ciprofloxacin   No known history of amoxicillin-clavulanate-associated cholestatic jaundice or nitrofurantoin-associated cholestatic jaundice.   Past medical history   Immune conditions: No immunocompromising conditions.   No history of cancer.   Patient did not request an excuse note.   Assessment   Uncomplicated acute UTI.   This is the likely diagnosis based on patient's interview responses, including:    Symptom profile    Previous history of UTI    Current symptoms are exactly the same as previous UTIs   No recent history of kidney stones.   Plan   Medications:    nitrofurantoin monohydrate/macrocrystals 100 mg capsule RX 100mg 1 cap PO q12h 5d for infection. This medication is an antibiotic. Take it exactly as directed. You must finish the entire course of medication, even if you feel better after taking the first few doses. Amount is 10 cap.    phenazopyridine 200 mg tablet RX 200mg 1 tab PO tid PRN 2d for pain or discomfort associated with your condition. Amount is 6 tab.    fluconazole 150 mg tablet RX 150mg 1 tab PO once 1d as a single dose for vaginal yeast infection. Repeat in 72 hours if symptoms persist. Amount is 2 tab.   The patient's prescriptions will be sent to:   [facility] [id number]   [address] [place] [address] [place] [facility]   Phone: [phone]     Fax: [phone]   Education:    Condition and causes    Prevention    Treatment and self-care    When to call provider   Follow-up:   Patient to follow up as needed for progression or lack of improvement in symptoms within 3d.   ----------   Electronically signed by ROYCE Cisneros on 2023-05-31 at 08:49AM   ----------   Patient Interview Transcript:   Knowing about your anatomy is important for diagnosing and treating UTIs. The  gender we have on file for you is female, but we realize that this might not tell the whole story. Would you like to tell us more about your anatomy?    No   Not selected:    Yes   Which of these symptoms do you have? Select all that apply.    Frequent urination    Sudden urge to urinate and it's hard to hold the urine in   Not selected:    Pain or burning while urinating   How long have you had these symptoms? Select one.    Less than 24 hours   Not selected:    1 to 3 days    4 to 6 days    7 to 10 days    More than 10 days   Since your current symptoms started, has it been difficult to start, stop, or delay urination? Select one.    Yes   Not selected:    No   What color is your urine? Select one.    Yellow   Not selected:    Clear    Cloudy    Pink or red   Does your urine smell strange (like ammonia) or stronger than usual? Select one.    No   Not selected:    Yes   Do you also have any of these symptoms? Select all that apply.    No   Not selected:    Fever    Nausea    Vomiting    Pain, pressure, or discomfort in the lower abdomen    Back pain   Do you have any flank pain? The flank is the side of the body between the ribs and the hips.    No   Not selected:    Yes, in my left flank    Yes, in my right flank    Yes, in both my left and right flanks   Do you have any of these vaginal symptoms? Select all that apply.    No   Not selected:    Abnormal vaginal itching    Unscheduled or abnormal vaginal bleeding or spotting    Pain during sex    Visible sores on the vagina    Abnormal vaginal discharge   In the past 2 weeks, have you had a medical device or instrument placed in your urinary tract? Examples include catheters, stents, and nephrostomy tubes. Select one.    No   Not selected:    Yes   Have you recently been hospitalized or been a resident of a nursing home or other long-term care facility? This doesn't include emergency room (ER) visits. Select one.    No   Not selected:    Yes, within the last 2  weeks    Yes, within the last 3 months   Have you ever had severe problems with your kidneys, such as kidney failure? Select one.    No, not that I recall   Not selected:    Yes   Kidney stones    No   Not selected:    Within the last year    More than a year ago   Kidney infection (pyelonephritis)    No   Not selected:    Within the last year    More than a year ago   Have you ever been diagnosed with any of these? Select all that apply.    No   Not selected:    Urinary reflux    Bladder diverticula    Single (or horseshoe) kidney    Duplicated urethra   Have you recently held your urine for a long time after you felt the urge to go? Select one.    No   Not selected:    Yes   Have you recently avoided eating or drinking so you wouldn't have the urge to urinate as often? Select one.    No   Not selected:    Yes   Do you use a diaphragm? Select one.    No   Not selected:    Yes   Have you gone through menopause? Select one.    No   Not selected:    Yes    I'm going through it now   Are you pregnant? Select one.    No   Not selected:    Yes   When was your last menstrual period? If you don't currently have periods or no longer have periods, please briefly explain.    5/10/23   Are you breastfeeding? Select one.    No   Not selected:    Yes   Have you had sexual intercourse in the past week? Recent sexual intercourse is a risk factor for urinary tract infections. Select one.    Yes   Not selected:    No   Have you had unprotected sexual intercourse with a new partner in the last 2 weeks? Select one.    No   Not selected:    Yes   Have you traveled to any of these countries within the last 3 months? Recent travel to these countries may affect which medication we recommend for your symptoms. Select all that apply.    None of these   Not selected:    [place]    [place]    [place]    [place]   Acetaminophen (Tylenol)    Not helpful   Not selected:    Helpful   Ibuprofen (Advil, Motrin)    Not helpful   Not selected:    Helpful    Phenazopyridine (Azo, Baridium, Pyridium, Uricalm, Uristat)    Not helpful   Not selected:    Helpful   Have you ever had a urinary tract infection (UTI)? A UTI is often called a bladder infection or acute cystitis. Select one.    Yes   Not selected:    No, not that I know of   How much do your current symptoms feel like past UTIs? Select one.    Exactly the same   Not selected:    Mostly the same    Somewhat the same    Totally different   In the past year, how many times have you taken antibiotics for a UTI? Select one.    1 to 3   Not selected:    0    4 or more   When did you last take antibiotics for a UTI? Select one.    More than 3 months ago   Not selected:    Within the last 3 months   Have you ever developed a yeast infection as a result of taking antibiotics? Select one.    Yes   Not selected:    No, not that I know of   UTIs may be more serious when other factors are present. Let's address those now. Are you being treated for type 1 or type 2 diabetes? Select one.    No   Not selected:    Yes   Do you have any of these conditions that can affect the immune system? Scroll to see all options. Select all that apply.    None of these   Not selected:    History of bone marrow transplant    Chronic kidney disease    Chronic liver disease (including cirrhosis)    HIV/AIDS    Inflammatory bowel disease (Crohn's disease or ulcerative colitis)    Lupus    Moderate to severe plaque psoriasis    Multiple sclerosis    Rheumatoid arthritis    Sickle cell anemia    Alpha or beta thalassemia    History of solid organ transplant (kidney, liver, or heart)    History of spleen removal    An autoimmune disorder not listed here (specify)    A condition requiring treatment with long-term use of oral steroids (such as prednisone, prednisolone, or dexamethasone) (specify)   Have you ever been diagnosed with cancer? Select one.    No   Not selected:    Yes, I have cancer now    Yes, but I'm in remission   These last few  questions will help us create the right treatment plan for you. Are you being treated for any of these conditions? Select all that apply.    High blood pressure   Not selected:    Yamilka-Danlos syndrome    Folate deficiency    G6PD deficiency    History of aortic aneurysm or dissection    Marfan syndrome    Megaloblastic anemia    Mono (mononucleosis)    Myasthenia gravis    Oliguria or anuria    Peripheral vascular disease    No   Have you ever had jaundice as a result of taking amoxicillin-clavulanate (Augmentin) or nitrofurantoin (Macrobid)? Select all that apply.    No   Not selected:    Yes, from amoxicillin-clavulanate (Augmentin)    Yes, from nitrofurantoin (Macrobid, Macrodantin)   Are you taking any of these medications? Select all that apply.    An ACE inhibitor such as lisinopril, enalapril, captopril, or benazepril   Not selected:    An angiotensin II receptor blocker (ARB) such as candesartan, irbesartan, losartan, or valsartan    No   Are you still taking these medications listed in your medical record? If you're not taking any of these, click Next. Select all that apply.    multivitamin with minerals tablet tablet    cetirizine 10 MG tablet    lisinopril 5 MG tablet    Vitamin D3 50 MCG (2000 UT) capsule    omeprazole 20 MG capsule    Cyanocobalamin 1000 MCG/ML kit   Are you taking any other medications, vitamins, or supplements? Select one.    No   Not selected:    Yes   Have you ever had an allergic or bad reaction to any medication? Select one.    No   Not selected:    Yes   Do you need a doctor's note? A doctor's note confirms that you received care today and states when you can return to school or work. It does not contain information about your diagnosis or treatment plan. Your provider will make the final decision on whether to give you a doctor's note. Doctor's notes CANNOT be backdated. Select one.    No   Not selected:    Today only (1 day)    Today and tomorrow (2 days)    3 days   Is there  anything you'd like to add about your symptoms? Please limit your comments to the symptoms asked about in this interview. If you include comments about other concerns, your provider may recommend that you be seen in person.   The patient did not enter any additional information.   ----------   Medical history   The following information was received from the EMR on May 31, 2023.   Allergies:    OTHER   - Allergy Type:   - Reaction: Nausea And Vomiting   - Severity: None   - Clinical Status: Active   - Verification Status: Confirmed   Medications:    MULTIVITAMIN ADULT PO   - Route: Oral   - Start Date: February 08, 2018   - End Date: None   - Status: Active    cetirizine (zyrTEC) tablet   - Route: Oral   - Start Date: May 25, 2023   - End Date: None   - Status: Active    lisinopril (PRINIVIL,ZESTRIL) tablet   - Route: Oral   - Start Date: May 02, 2023   - End Date: None   - Status: Active    VITAMIN D 50 MCG (2000 UT) PO CAPS   - Route: Oral   - Start Date: May 23, 2023   - End Date: None   - Status: Active    omeprazole (priLOSEC) capsule   - Route: Oral   - Start Date: February 11, 2022   - End Date: None   - Status: Active    CYANOCOBALAMIN 1000 MCG/ML IJ KIT   - Route: Injection   - Start Date: May 26, 2022   - End Date: None   - Status: Active    azithromycin (ZITHROMAX) tablet   - Route:   - Start Date: May 02, 2023   - End Date: None   - Status: Active   Problem list:    Morbid obesity with BMI of 40.0-44.9, adult   - Category: Problem List Item   - Health Status:   - Start Date: February 15, 2016   - End Date: July 27, 2016   - Status: Resolved    Essential hypertension   - Category: Problem List Item   - Health Status:   - Start Date: February 15, 2016   - End Date: October 26, 2016   - Status: Resolved    Dietary counseling   - Category: Problem List Item   - Health Status:   - Start Date: February 15, 2016   - End Date: None   - Status: Active    Edema   - Category: Problem List Item   - Health Status:    - Start Date: April 14, 2016   - End Date: None   - Status: Active    Fatigue   - Category: Problem List Item   - Health Status:   - Start Date: April 14, 2016   - End Date: June 09, 2016   - Status: Resolved    Heartburn   - Category: Problem List Item   - Health Status:   - Start Date: April 14, 2016   - End Date: July 27, 2016   - Status: Resolved    Pain in joint   - Category: Problem List Item   - Health Status:   - Start Date: April 14, 2016   - End Date: None   - Status: Active    BMI 39.0-39.9,adult   - Category: Problem List Item   - Health Status:   - Start Date: May 02, 2016   - End Date: None   - Status: Inactive    BMI 40.0-44.9, adult   - Category: Problem List Item   - Health Status:   - Start Date: May 02, 2016   - End Date: None   - Status: Inactive    Post-op pain   - Category: Problem List Item   - Health Status:   - Start Date: May 05, 2016   - End Date: July 27, 2016   - Status: Resolved    Obesity, Class II, BMI 35-39.9   - Category: Problem List Item   - Health Status:   - Start Date: June 09, 2016   - End Date: July 27, 2016   - Status: Resolved    Obesity, Class I, BMI 30-34.9   - Category: Problem List Item   - Health Status:   - Start Date: July 27, 2016   - End Date: None   - Status: Active    Vitamin D deficiency   - Category: Problem List Item   - Health Status:   - Start Date: July 27, 2016   - End Date: None   - Status: Active    Pseudotumor cerebri syndrome   - Category: Problem List Item   - Health Status:   - Start Date: October 26, 2016   - End Date: None   - Status: Active    History of hypertension   - Category: Problem List Item   - Health Status:   - Start Date: October 26, 2016   - End Date: None   - Status: Active    Alopecia   - Category: Problem List Item   - Health Status:   - Start Date: October 26, 2016   - End Date: None   - Status: Active    Closed displaced fracture of fifth metatarsal bone of right foot   - Category: Problem List Item   - Health Status:   - Start  Date: February 17, 2022   - End Date: None   - Status: Active    BASIL (iron deficiency anemia)   - Category: Problem List Item   - Health Status:   - Start Date: June 07, 2022   - End Date: None   - Status: Active    Malabsorption of iron   - Category: Problem List Item   - Health Status:   - Start Date: June 07, 2022   - End Date: None   - Status: Active    B12 deficiency   - Category: Problem List Item   - Health Status:   - Start Date: December 28, 2022   - End Date: None   - Status: Active    Primary hypertension   - Category: Problem List Item   - Health Status:   - Start Date: March 07, 2023   - End Date: None   - Status: Active

## 2023-06-05 ENCOUNTER — OFFICE VISIT (OUTPATIENT)
Dept: FAMILY MEDICINE CLINIC | Age: 43
End: 2023-06-05
Payer: COMMERCIAL

## 2023-06-05 VITALS
BODY MASS INDEX: 34.49 KG/M2 | RESPIRATION RATE: 16 BRPM | TEMPERATURE: 98 F | SYSTOLIC BLOOD PRESSURE: 105 MMHG | DIASTOLIC BLOOD PRESSURE: 68 MMHG | OXYGEN SATURATION: 98 % | WEIGHT: 207 LBS | HEART RATE: 98 BPM | HEIGHT: 65 IN

## 2023-06-05 DIAGNOSIS — E53.8 VITAMIN B12 DEFICIENCY: ICD-10-CM

## 2023-06-05 DIAGNOSIS — I10 PRIMARY HYPERTENSION: Primary | ICD-10-CM

## 2023-06-05 DIAGNOSIS — E55.9 VITAMIN D INSUFFICIENCY: ICD-10-CM

## 2023-06-05 PROCEDURE — 99214 OFFICE O/P EST MOD 30 MIN: CPT | Performed by: NURSE PRACTITIONER

## 2023-06-05 NOTE — PROGRESS NOTES
Christy TORRES [facility]  [id number]  1980  female     06/05/2023      Chief Complaint  Hypertension (Has been good )    History of Present Illness  43-year-old female patient presents today to follow-up on hypertension.  Patient states her blood pressure has been good.  Patient denies headache, lightheadedness, dizziness, tinnitus, blurry vision, chest pain, palpitation, leg swelling, cough, shortness of breath, abdominal pain, NVD.  Patient states she has her routine Pap and mammogram scheduled in November through [facility].  Reviewed patient's previous lab work with her today which showed vitamin B12 deficiency and vitamin D insufficiency.  Patient has a history of iron deficiency which was reflected on her CBC.  Patient states she is followed by hematology and occasionally has to get iron transfusions.    Review of Systems   Constitutional: Negative.    HENT: Negative.     Eyes: Negative.    Respiratory: Negative.     Cardiovascular: Negative.    Gastrointestinal: Negative.    Endocrine: Negative.    Genitourinary: Negative.    Musculoskeletal: Negative.    Skin: Negative.    Neurological: Negative.    Hematological: Negative.    Psychiatric/Behavioral: Negative.       Past Medical History:   Diagnosis Date    Anemia 2017?    RNY gastric bypass. Periodically get ferritin infusions    Edema     Fatigue     Gall stones     Gastric band slippage     RESOLVED    Hypertension     Atypical. Was in pain with this BP    Joint pain     Joint pain, knee     Obesity, morbid     Plantar fasciitis     HISTORY    PONV (postoperative nausea and vomiting)     SEVERE N/V WITH GAS WITH GASTRIC BANDING    Pseudotumor cerebri     Urge and stress incontinence        Past Surgical History:   Procedure Laterality Date    BREAST BIOPSY Right     @[facility]---no clip placed    CHOLECYSTECTOMY      COLONOSCOPY      Fiberoptic    GASTRIC BANDING REMOVAL      Laparosc Restrictive Proc Adjustable Gastric Band Removal    LAPAROSCOPIC GASTRIC  "BANDING      Laparosc Restrictive Proc Adjustable Gastric Band Placement    MS LAPS GSTR RSTCV PX W/BYP ANCA-EN-Y LIMB <150 CM N/A 05/02/2016    Procedure: REVISION GASTRIC BYPASS LAPAROSCOPIC , PREVIOUS LAP BAND,WITH LYSIS OF ADHESIONS;  Surgeon: Quinn Enrique Jr., MD;  Location: [facility] OR [facility];  Service: General       Family History   Problem Relation Age of Onset    Heart disease Other     Hypertension Other     Heart attack Other     Sleep apnea Other     Obesity Other         Morbid    Hypertension Mother     Hypertension Father        Social History     Socioeconomic History    Marital status:    Tobacco Use    Smoking status: Never    Smokeless tobacco: Never   Vaping Use    Vaping Use: Never used   Substance and Sexual Activity    Alcohol use: Yes     Alcohol/week: 2.0 standard drinks     Types: 2 Drinks containing 0.5 oz of alcohol per week     Comment: Have cut down to zero    Drug use: No    Sexual activity: Yes     Partners: Male     Birth control/protection: Surgical        Allergies   Allergen Reactions    Other Nausea And Vomiting     Anesthesia gas     Only gas-- not iv         Objective   Vital Signs:   /68 (BP Location: Left arm, Patient Position: Sitting, Cuff Size: Adult)   Pulse 98   Temp 98 °F (36.7 °C) (Temporal)   Resp 16   Ht 165.1 cm (65\")   Wt 93.9 kg (207 lb)   SpO2 98%   BMI 34.45 kg/m²       Physical Exam  Vitals and nursing note reviewed.   Constitutional:       General: She is not in acute distress.     Appearance: Normal appearance. She is not ill-appearing, toxic-appearing or diaphoretic.   HENT:      Head: Normocephalic and atraumatic.      Jaw: There is normal jaw occlusion.      Right Ear: Hearing and external ear normal.      Left Ear: Hearing and external ear normal.      Nose: Nose normal.      Mouth/Throat:      Lips: Pink.   Eyes:      General: Lids are normal. Vision grossly intact. Gaze aligned appropriately.      Extraocular Movements: Extraocular " movements intact.      Conjunctiva/sclera: Conjunctivae normal.      Pupils: Pupils are equal, round, and reactive to light.   Cardiovascular:      Rate and Rhythm: Normal rate and regular rhythm.      Pulses: Normal pulses.           Carotid pulses are 2+ on the right side and 2+ on the left side.       Radial pulses are 2+ on the right side and 2+ on the left side.        Dorsalis pedis pulses are 2+ on the right side and 2+ on the left side.        Posterior tibial pulses are 2+ on the right side and 2+ on the left side.      Heart sounds: Normal heart sounds, S1 normal and S2 normal. No murmur heard.  Pulmonary:      Effort: Pulmonary effort is normal.      Breath sounds: Normal breath sounds and air entry.   Abdominal:      General: Abdomen is flat. Bowel sounds are normal. There is no distension or abdominal bruit.      Palpations: Abdomen is soft.      Tenderness: There is no abdominal tenderness.   Musculoskeletal:         General: Normal range of motion.      Cervical back: Full passive range of motion without pain, normal range of motion and neck supple.      Right lower leg: No edema.      Left lower leg: No edema.   Skin:     General: Skin is warm and dry.      Capillary Refill: Capillary refill takes less than 2 seconds.      Coloration: Skin is not cyanotic or pale.      Findings: No bruising, erythema or rash.   Neurological:      General: No focal deficit present.      Mental Status: She is alert and oriented to person, place, and time. Mental status is at baseline.      GCS: GCS eye subscore is 4. GCS verbal subscore is 5. GCS motor subscore is 6.      Cranial Nerves: Cranial nerves 2-12 are intact. No cranial nerve deficit.      Sensory: Sensation is intact. No sensory deficit.      Motor: Motor function is intact. No weakness.      Coordination: Coordination is intact. Coordination normal.      Gait: Gait is intact. Gait normal.      Deep Tendon Reflexes: Reflexes normal.   Psychiatric:          Attention and Perception: Attention and perception normal.         Mood and Affect: Mood and affect normal.         Speech: Speech normal.         Behavior: Behavior normal. Behavior is cooperative.         Thought Content: Thought content normal.         Cognition and Memory: Cognition and memory normal.         Judgment: Judgment normal.               Assessment and Plan   Diagnoses and all orders for this visit:    1. Primary hypertension (Primary)  Assessment & Plan:  BP stable.  Continue current dose of 5 mg lisinopril daily.  Will monitor.      2. Vitamin D insufficiency  Assessment & Plan:  Continue current dose of vitamin D.  Pending labs.    Orders:  -     Vitamin D 25 hydroxy; Future    3. Vitamin B12 deficiency  Assessment & Plan:  Continue current dose of vitamin B12.  Pending labs.    Orders:  -     Vitamin B12; Future        Follow Up   Return in about 1 month (around 7/5/2023) for Annual physical.    There are no Patient Instructions on file for this visit.

## 2023-07-24 DIAGNOSIS — E55.9 VITAMIN D INSUFFICIENCY: ICD-10-CM

## 2023-07-24 RX ORDER — MULTIVIT-MIN/IRON/FOLIC ACID/K 18-600-40
2000 CAPSULE ORAL DAILY
Qty: 30 CAPSULE | Refills: 0 | Status: CANCELLED | OUTPATIENT
Start: 2023-07-24

## 2023-07-25 RX ORDER — MULTIVIT-MIN/IRON/FOLIC ACID/K 18-600-40
2000 CAPSULE ORAL DAILY
Qty: 30 CAPSULE | Refills: 0 | Status: SHIPPED | OUTPATIENT
Start: 2023-07-25

## 2023-07-25 RX ORDER — OMEPRAZOLE 20 MG/1
20 CAPSULE, DELAYED RELEASE ORAL DAILY
Qty: 30 CAPSULE | Refills: 0 | Status: SHIPPED | OUTPATIENT
Start: 2023-07-25

## 2023-08-11 DIAGNOSIS — Z12.31 ENCOUNTER FOR SCREENING MAMMOGRAM FOR MALIGNANT NEOPLASM OF BREAST: Primary | ICD-10-CM

## 2023-08-16 ENCOUNTER — TELEPHONE (OUTPATIENT)
Dept: ONCOLOGY | Facility: CLINIC | Age: 43
End: 2023-08-16
Payer: COMMERCIAL

## 2023-08-16 DIAGNOSIS — D50.9 IRON DEFICIENCY ANEMIA, UNSPECIFIED IRON DEFICIENCY ANEMIA TYPE: Primary | ICD-10-CM

## 2023-08-16 NOTE — TELEPHONE ENCOUNTER
Patient stated she is starting to have s/s like last time when her iron levels were low. After speaking with Dr. Lipscomb pt was added to the lab schedule at the [facility] office this Friday. Patient was also scheduled to see Dr. Lipscomb at the [facility] office on 8/31. She v/u of all appts and had no other questions.

## 2023-08-18 ENCOUNTER — CLINICAL SUPPORT (OUTPATIENT)
Dept: FAMILY MEDICINE CLINIC | Facility: CLINIC | Age: 43
End: 2023-08-18
Payer: COMMERCIAL

## 2023-08-18 DIAGNOSIS — D50.9 IRON DEFICIENCY ANEMIA, UNSPECIFIED IRON DEFICIENCY ANEMIA TYPE: ICD-10-CM

## 2023-08-18 DIAGNOSIS — E53.8 VITAMIN B12 DEFICIENCY: ICD-10-CM

## 2023-08-18 DIAGNOSIS — E55.9 VITAMIN D INSUFFICIENCY: ICD-10-CM

## 2023-08-18 PROCEDURE — 84466 ASSAY OF TRANSFERRIN: CPT | Performed by: INTERNAL MEDICINE

## 2023-08-18 PROCEDURE — 82607 VITAMIN B-12: CPT | Performed by: INTERNAL MEDICINE

## 2023-08-18 PROCEDURE — 85025 COMPLETE CBC W/AUTO DIFF WBC: CPT | Performed by: INTERNAL MEDICINE

## 2023-08-18 PROCEDURE — 36415 COLL VENOUS BLD VENIPUNCTURE: CPT | Performed by: INTERNAL MEDICINE

## 2023-08-18 PROCEDURE — 83540 ASSAY OF IRON: CPT | Performed by: INTERNAL MEDICINE

## 2023-08-18 PROCEDURE — 82306 VITAMIN D 25 HYDROXY: CPT | Performed by: INTERNAL MEDICINE

## 2023-08-18 PROCEDURE — 82728 ASSAY OF FERRITIN: CPT | Performed by: INTERNAL MEDICINE

## 2023-08-18 NOTE — PROGRESS NOTES
Venipuncture Blood Specimen Collection  Venipuncture performed in L ARM by Edith Rosado MA with good hemostasis. Patient tolerated the procedure well without complications.   08/18/23   Edith Rosado MA

## 2023-08-19 LAB
25(OH)D3 SERPL-MCNC: 37.5 NG/ML (ref 30–100)
BASOPHILS # BLD AUTO: 0.04 10*3/MM3 (ref 0–0.2)
BASOPHILS NFR BLD AUTO: 0.6 % (ref 0–1.5)
DEPRECATED RDW RBC AUTO: 44.8 FL (ref 37–54)
EOSINOPHIL # BLD AUTO: 0.16 10*3/MM3 (ref 0–0.4)
EOSINOPHIL NFR BLD AUTO: 2.3 % (ref 0.3–6.2)
ERYTHROCYTE [DISTWIDTH] IN BLOOD BY AUTOMATED COUNT: 16.7 % (ref 12.3–15.4)
FERRITIN SERPL-MCNC: 8.13 NG/ML (ref 13–150)
HCT VFR BLD AUTO: 33.5 % (ref 34–46.6)
HGB BLD-MCNC: 9.6 G/DL (ref 12–15.9)
IMM GRANULOCYTES # BLD AUTO: 0.02 10*3/MM3 (ref 0–0.05)
IMM GRANULOCYTES NFR BLD AUTO: 0.3 % (ref 0–0.5)
IRON 24H UR-MRATE: 21 MCG/DL (ref 37–145)
IRON SATN MFR SERPL: 4 % (ref 20–50)
LYMPHOCYTES # BLD AUTO: 1.46 10*3/MM3 (ref 0.7–3.1)
LYMPHOCYTES NFR BLD AUTO: 21.4 % (ref 19.6–45.3)
MCH RBC QN AUTO: 21.9 PG (ref 26.6–33)
MCHC RBC AUTO-ENTMCNC: 28.7 G/DL (ref 31.5–35.7)
MCV RBC AUTO: 76.3 FL (ref 79–97)
MONOCYTES # BLD AUTO: 0.55 10*3/MM3 (ref 0.1–0.9)
MONOCYTES NFR BLD AUTO: 8.1 % (ref 5–12)
NEUTROPHILS NFR BLD AUTO: 4.59 10*3/MM3 (ref 1.7–7)
NEUTROPHILS NFR BLD AUTO: 67.3 % (ref 42.7–76)
NRBC BLD AUTO-RTO: 0 /100 WBC (ref 0–0.2)
PLATELET # BLD AUTO: 398 10*3/MM3 (ref 140–450)
PMV BLD AUTO: 9.7 FL (ref 6–12)
RBC # BLD AUTO: 4.39 10*6/MM3 (ref 3.77–5.28)
TIBC SERPL-MCNC: 578 MCG/DL (ref 298–536)
TRANSFERRIN SERPL-MCNC: 388 MG/DL (ref 200–360)
VIT B12 BLD-MCNC: 154 PG/ML (ref 211–946)
WBC NRBC COR # BLD: 6.82 10*3/MM3 (ref 3.4–10.8)

## 2023-08-21 ENCOUNTER — TELEPHONE (OUTPATIENT)
Dept: ONCOLOGY | Facility: CLINIC | Age: 43
End: 2023-08-21
Payer: COMMERCIAL

## 2023-08-21 NOTE — TELEPHONE ENCOUNTER
----- Message from ROYCE Myers sent at 8/19/2023  1:04 PM EDT -----  The patient does have iron deficiency anemia again.  She already has a history of BASIL with malabsorption.  We can repeat the same iron replacement as Dr. Lipscomb ordered previously which was for Venofer 300 mg IV x4 weekly doses.  If we cannot get it started this week she would be able to get the second dose with her appointment with him next week.  Thank you

## 2023-08-21 NOTE — TELEPHONE ENCOUNTER
Attempted to call the patient and make her aware Dr. Lipscomb would like her to have IV iron. No answer. V/m was left with call back information. Plan was entered.

## 2023-08-29 NOTE — PROGRESS NOTES
HEMATOLOGY ONCOLOGY OUTPATIENT FOLLOWUP      Patient name: Christy Castillo  : 1980  MRN: [id number]  Primary Care Physician: Grover Ventura APRN  Referring Physician: Grover Ventura APRN  Reason For Consult:     Chief Complaint   Patient presents with    Follow-up     Iron deficiency anemia, unspecified iron deficiency anemia type         HPI:   History of Present Illness:  Christy Castillo is 43 y.o. female who presented to our office on 22 for consultation regarding Iron deficiency Anemia    Patient had labs drawn in 2022     Hb 9.5 , hct 32.3, MCV 77.8, plt 372, no prior CBC recently. 3-4 yrs ago hemoglobin was normal.  Patient was given oral iron.    2022 - Hb 10.1, hct 34, MCV 76.2, plt 379, vitamin B12 336, ferritin 6.31, iron sat 5, TIBC 550  Patient has been taking oral iron. No rectal bleeding.   2022 - iv venofer x4   2022 - Hb 11.6, WBC 7.94  2023 -iron saturation 4, TIBC 578, hemoglobin 9.6, hematocrit 33.5, platelet 398 vitamin B12 154    Subjective:  Fatigue has worsened.    The following portions of the patient's history were reviewed and updated as appropriate: allergies, current medications, past family history, past medical history, past social history, past surgical history and problem list.    Past Medical History:   Diagnosis Date    Anemia ?    RNY gastric bypass. Periodically get ferritin infusions    Edema     Fatigue     Gall stones     Gastric band slippage     RESOLVED    Hypertension     Atypical. Was in pain with this BP    Joint pain     Joint pain, knee     Obesity, morbid     Plantar fasciitis     HISTORY    PONV (postoperative nausea and vomiting)     SEVERE N/V WITH GAS WITH GASTRIC BANDING    Pseudotumor cerebri     Urge and stress incontinence        Past Surgical History:   Procedure Laterality Date    BREAST BIOPSY Right     @[facility]---no clip placed    CHOLECYSTECTOMY      COLONOSCOPY      Fiberoptic    GASTRIC BANDING  REMOVAL      Laparosc Restrictive Proc Adjustable Gastric Band Removal    LAPAROSCOPIC GASTRIC BANDING      Laparosc Restrictive Proc Adjustable Gastric Band Placement    [place] LAPS GSTR RSTCV PX W/BYP ANCA-EN-Y LIMB <150 CM N/A 05/02/2016    Procedure: REVISION GASTRIC BYPASS LAPAROSCOPIC , PREVIOUS LAP BAND,WITH LYSIS OF ADHESIONS;  Surgeon: Quinn Enrique Jr., MD;  [facility]: [facility] OR [facility];  Service: General         Current Outpatient Medications:     cetirizine (zyrTEC) 10 MG tablet, Take 1 tablet by mouth Daily., Disp: 30 tablet, Rfl: 0    Cholecalciferol (Vitamin D) 50 MCG (2000 UT) capsule, Take 1 capsule by mouth Daily., Disp: 30 capsule, Rfl: 0    Cyanocobalamin 1000 MCG/ML kit, Inject 1 mL as directed Every 30 (Thirty) Days. 1st of the month, Disp: , Rfl:     lisinopril (PRINIVIL,ZESTRIL) 5 MG tablet, Take 1 tablet by mouth Daily., Disp: 90 tablet, Rfl: 1    Multiple Vitamins-Minerals (MULTIVITAMIN ADULT PO), Take 1 tablet by mouth Daily., Disp: , Rfl:     omeprazole (priLOSEC) 20 MG capsule, Take 1 capsule by mouth Daily., Disp: 30 capsule, Rfl: 0  No current facility-administered medications for this visit.    Allergies   Allergen Reactions    Other Nausea And Vomiting     Anesthesia gas     Only gas-- not iv       Family History   Problem Relation Age of Onset    Heart disease Other     Hypertension Other     Heart attack Other     Sleep apnea Other     Obesity Other         Morbid    Hypertension Mother     Hypertension Father        Cancer-related family history is not on file.    Social History     Tobacco Use    Smoking status: Never    Smokeless tobacco: Never   Vaping Use    Vaping Use: Never used   Substance Use Topics    Alcohol use: Yes     Alcohol/week: 2.0 standard drinks     Types: 2 Drinks containing 0.5 oz of alcohol per week     Comment: Have cut down to zero    Drug use: No     Social History     Social History Narrative    Not on file          Objective:    Vitals:    08/31/23 1203  "  BP: 136/85   Pulse: 106   Resp: 18   Temp: 98.2 øF (36.8 øC)   SpO2: 100%   Weight: 93 kg (205 lb)   Height: 165.1 cm (65\")   PainSc: 0-No pain     Body mass index is 34.11 kg/mý.  ECOG  (0) Fully active, able to carry on all predisease performance without restriction    Physical Exam:     Physical Exam  Constitutional:       Appearance: Normal appearance.   HENT:      Head: Normocephalic and atraumatic.      Mouth/Throat:      Mouth: Mucous membranes are moist.   Eyes:      Pupils: Pupils are equal, round, and reactive to light.   Cardiovascular:      Rate and Rhythm: Normal rate and regular rhythm.      Pulses: Normal pulses.      Heart sounds: Normal heart sounds. No murmur heard.  Pulmonary:      Effort: Pulmonary effort is normal.      Breath sounds: Normal breath sounds.   Abdominal:      General: There is no distension.      Palpations: Abdomen is soft. There is no mass.      Tenderness: There is no abdominal tenderness.   Musculoskeletal:         General: Normal range of motion.      Cervical back: Normal range of motion and neck supple.   Skin:     General: Skin is warm.   Neurological:      General: No focal deficit present.      Mental Status: She is alert.   Psychiatric:         Mood and Affect: Mood normal.         Lab Results - Last 18 Months   Lab Units 08/18/23  0834 05/22/23  0850 12/20/22  0811   WBC 10*3/mm3 6.82 6.53 5.24   HEMOGLOBIN g/dL 9.6* 10.2* 12.0   HEMATOCRIT % 33.5* 33.8* 38.3   PLATELETS 10*3/mm3 398 403 284   MCV fL 76.3* 76.6* 90.1     Lab Results - Last 18 Months   Lab Units 05/22/23  0850 10/06/22  1228 04/07/22  0932   SODIUM mmol/L 136 137 140   POTASSIUM mmol/L 4.6 3.7 4.0   CHLORIDE mmol/L 101 98 102   CO2 mmol/L 27.0 27.0 24.5   BUN mg/dL 7 7 7   CREATININE mg/dL 0.55* 0.69 0.57   CALCIUM mg/dL 9.1 9.4 9.1   BILIRUBIN mg/dL 0.4 0.7 0.3   ALK PHOS U/L 74 82 82   ALT (SGPT) U/L 41* 15 11   AST (SGOT) U/L 29 17 17   GLUCOSE mg/dL 90 100* 78       Lab Results   Component Value " Date    GLUCOSE 90 05/22/2023    BUN 7 05/22/2023    CREATININE 0.55 (L) 05/22/2023    EGFRIFNONA 96 02/21/2022    BCR 12.7 05/22/2023    K 4.6 05/22/2023    CO2 27.0 05/22/2023    CALCIUM 9.1 05/22/2023    ALBUMIN 4.2 05/22/2023    AST 29 05/22/2023    ALT 41 (H) 05/22/2023       No results for input(s): APTT, INR, PTT in the last 59533 hours.    Lab Results   Component Value Date    IRON 21 (L) 08/18/2023    TIBC 578 (H) 08/18/2023    FERRITIN 8.13 (L) 08/18/2023       Lab Results   Component Value Date    FOLATE 5.70 11/04/2016       No results found for: OCCULTBLD    No results found for: RETICCTPCT    Lab Results   Component Value Date    [id number] 154 (L) 08/18/2023     No results found for: SPEP, UPEP  No results found for: LDH, URICACID  No results found for: MARTIN, RF, SEDRATE  No results found for: FIBRINOGEN, HAPTOGLOBIN    No results found for: PTT, INR  No results found for:   No results found for: CEA  No components found for: CA-19-9  No results found for: PSA   No results found for: AFPTM, XG6449, HCGTM, SPEP, MIKEY         Assessment & Plan     Patient is a 43 yr old female with microcytic anemia secondary to iron deficiency.     Iron deficiency Anemia  Microcytic anemia with elevated TIBC and low iron sat points to severe iron deficiency.  With oral iron malabsorption, was given iv iron infusion with venofer for 4 doses.  Now improvement   Likely reason is poor absorption with gastric bypass.  Now with low iron saturation, low ferritin patient will get IV iron infusion Venofer for 4 treatments  Repeat CBC and iron studies in 3 months and follow-up    Vitamin B12 injection  Levels have decreased she will need monthly vitamin B12 injection       Thank you very much for providing the opportunity to participate in this patient's care. Please do not hesitate to call if there are any other questions  George Lipscomb MD

## 2023-08-30 ENCOUNTER — HOSPITAL ENCOUNTER (OUTPATIENT)
Dept: ONCOLOGY | Facility: HOSPITAL | Age: 43
Discharge: HOME | End: 2023-08-30
Payer: COMMERCIAL

## 2023-08-30 VITALS
HEIGHT: 65 IN | SYSTOLIC BLOOD PRESSURE: 126 MMHG | WEIGHT: 207 LBS | TEMPERATURE: 98.4 F | RESPIRATION RATE: 16 BRPM | BODY MASS INDEX: 34.49 KG/M2 | DIASTOLIC BLOOD PRESSURE: 78 MMHG | HEART RATE: 71 BPM | OXYGEN SATURATION: 99 %

## 2023-08-30 DIAGNOSIS — D50.9 IRON DEFICIENCY ANEMIA, UNSPECIFIED IRON DEFICIENCY ANEMIA TYPE: ICD-10-CM

## 2023-08-30 DIAGNOSIS — K90.9 MALABSORPTION OF IRON: Primary | ICD-10-CM

## 2023-08-30 PROCEDURE — 96365 THER/PROPH/DIAG IV INF INIT: CPT

## 2023-08-30 PROCEDURE — 96366 THER/PROPH/DIAG IV INF ADDON: CPT

## 2023-08-30 PROCEDURE — 25010000002 IRON SUCROSE PER 1 MG: Performed by: INTERNAL MEDICINE

## 2023-08-30 RX ORDER — SODIUM CHLORIDE 9 MG/ML
250 INJECTION, SOLUTION INTRAVENOUS ONCE
Status: COMPLETED | OUTPATIENT
Start: 2023-08-30 | End: 2023-08-30

## 2023-08-30 RX ADMIN — IRON SUCROSE 300 MG: 20 INJECTION, SOLUTION INTRAVENOUS at 11:06

## 2023-08-30 RX ADMIN — SODIUM CHLORIDE 250 ML: 9 INJECTION, SOLUTION INTRAVENOUS at 11:05

## 2023-08-30 NOTE — PROGRESS NOTES
Pt here for C1D1 Venofer 300 mg.  Pt w/ no complaints today.  PIV placed w/ positive blood return noted.  No labs needed today.  Treatment given and pt tolerated.  IV removed.  Pt given AVS w/ next appointment and v/u.  Pt discharged from clinic.   PROVIDER:[TOKEN:[[id number]]]

## 2023-08-31 ENCOUNTER — OFFICE VISIT (OUTPATIENT)
Dept: ONCOLOGY | Facility: CLINIC | Age: 43
End: 2023-08-31
Payer: COMMERCIAL

## 2023-08-31 VITALS
SYSTOLIC BLOOD PRESSURE: 136 MMHG | OXYGEN SATURATION: 100 % | DIASTOLIC BLOOD PRESSURE: 85 MMHG | WEIGHT: 205 LBS | HEIGHT: 65 IN | BODY MASS INDEX: 34.16 KG/M2 | RESPIRATION RATE: 18 BRPM | TEMPERATURE: 98.2 F | HEART RATE: 106 BPM

## 2023-08-31 DIAGNOSIS — D50.9 IRON DEFICIENCY ANEMIA, UNSPECIFIED IRON DEFICIENCY ANEMIA TYPE: ICD-10-CM

## 2023-08-31 DIAGNOSIS — K90.9 MALABSORPTION OF IRON: Primary | ICD-10-CM

## 2023-09-07 ENCOUNTER — HOSPITAL ENCOUNTER (OUTPATIENT)
Dept: ONCOLOGY | Facility: HOSPITAL | Age: 43
Discharge: HOME | End: 2023-09-07
Admitting: INTERNAL MEDICINE
Payer: COMMERCIAL

## 2023-09-07 ENCOUNTER — TELEPHONE (OUTPATIENT)
Dept: ONCOLOGY | Facility: CLINIC | Age: 43
End: 2023-09-07
Payer: COMMERCIAL

## 2023-09-07 VITALS
BODY MASS INDEX: 34.16 KG/M2 | HEART RATE: 68 BPM | SYSTOLIC BLOOD PRESSURE: 124 MMHG | OXYGEN SATURATION: 96 % | TEMPERATURE: 97.6 F | DIASTOLIC BLOOD PRESSURE: 84 MMHG | WEIGHT: 205 LBS | RESPIRATION RATE: 16 BRPM | HEIGHT: 65 IN

## 2023-09-07 DIAGNOSIS — D50.9 IRON DEFICIENCY ANEMIA, UNSPECIFIED IRON DEFICIENCY ANEMIA TYPE: ICD-10-CM

## 2023-09-07 DIAGNOSIS — K90.9 MALABSORPTION OF IRON: Primary | ICD-10-CM

## 2023-09-07 PROCEDURE — 96365 THER/PROPH/DIAG IV INF INIT: CPT

## 2023-09-07 PROCEDURE — 96366 THER/PROPH/DIAG IV INF ADDON: CPT

## 2023-09-07 PROCEDURE — 25010000002 IRON SUCROSE PER 1 MG: Performed by: INTERNAL MEDICINE

## 2023-09-07 RX ORDER — SODIUM CHLORIDE 9 MG/ML
250 INJECTION, SOLUTION INTRAVENOUS ONCE
Status: COMPLETED | OUTPATIENT
Start: 2023-09-07 | End: 2023-09-07

## 2023-09-07 RX ADMIN — SODIUM CHLORIDE 250 ML: 9 INJECTION, SOLUTION INTRAVENOUS at 12:11

## 2023-09-07 RX ADMIN — IRON SUCROSE 300 MG: 20 INJECTION, SOLUTION INTRAVENOUS at 12:11

## 2023-09-07 NOTE — TELEPHONE ENCOUNTER
Pt has orders from Dr Lipscomb for B12 but she already he's this shot prescribed from another provider. Pt stated she will keep up with monthly injections as directed by other provider and as well as Dr Lipscomb

## 2023-09-15 ENCOUNTER — HOSPITAL ENCOUNTER (OUTPATIENT)
Dept: ONCOLOGY | Facility: HOSPITAL | Age: 43
Discharge: HOME | End: 2023-09-15
Payer: COMMERCIAL

## 2023-09-15 VITALS
BODY MASS INDEX: 36.24 KG/M2 | HEART RATE: 56 BPM | WEIGHT: 217.5 LBS | OXYGEN SATURATION: 98 % | RESPIRATION RATE: 14 BRPM | SYSTOLIC BLOOD PRESSURE: 110 MMHG | HEIGHT: 65 IN | DIASTOLIC BLOOD PRESSURE: 74 MMHG | TEMPERATURE: 98.6 F

## 2023-09-15 DIAGNOSIS — D50.9 IRON DEFICIENCY ANEMIA, UNSPECIFIED IRON DEFICIENCY ANEMIA TYPE: ICD-10-CM

## 2023-09-15 DIAGNOSIS — K90.9 MALABSORPTION OF IRON: Primary | ICD-10-CM

## 2023-09-15 PROCEDURE — 25010000002 IRON SUCROSE PER 1 MG: Performed by: INTERNAL MEDICINE

## 2023-09-15 PROCEDURE — 96366 THER/PROPH/DIAG IV INF ADDON: CPT

## 2023-09-15 PROCEDURE — 96365 THER/PROPH/DIAG IV INF INIT: CPT

## 2023-09-15 RX ORDER — SODIUM CHLORIDE 9 MG/ML
250 INJECTION, SOLUTION INTRAVENOUS ONCE
Status: COMPLETED | OUTPATIENT
Start: 2023-09-15 | End: 2023-09-15

## 2023-09-15 RX ADMIN — SODIUM CHLORIDE 250 ML: 9 INJECTION, SOLUTION INTRAVENOUS at 14:17

## 2023-09-15 RX ADMIN — IRON SUCROSE 300 MG: 20 INJECTION, SOLUTION INTRAVENOUS at 14:18

## 2023-09-18 ENCOUNTER — TELEPHONE (OUTPATIENT)
Dept: ONCOLOGY | Facility: CLINIC | Age: 43
End: 2023-09-18

## 2023-09-26 ENCOUNTER — HOSPITAL ENCOUNTER (OUTPATIENT)
Dept: ONCOLOGY | Facility: HOSPITAL | Age: 43
Discharge: HOME | End: 2023-09-26
Admitting: INTERNAL MEDICINE
Payer: COMMERCIAL

## 2023-09-26 VITALS
OXYGEN SATURATION: 98 % | BODY MASS INDEX: 34.16 KG/M2 | SYSTOLIC BLOOD PRESSURE: 135 MMHG | HEIGHT: 65 IN | HEART RATE: 76 BPM | TEMPERATURE: 97.9 F | DIASTOLIC BLOOD PRESSURE: 85 MMHG | RESPIRATION RATE: 16 BRPM | WEIGHT: 205 LBS

## 2023-09-26 DIAGNOSIS — K90.9 MALABSORPTION OF IRON: Primary | ICD-10-CM

## 2023-09-26 DIAGNOSIS — D50.9 IRON DEFICIENCY ANEMIA, UNSPECIFIED IRON DEFICIENCY ANEMIA TYPE: ICD-10-CM

## 2023-09-26 PROCEDURE — 96366 THER/PROPH/DIAG IV INF ADDON: CPT

## 2023-09-26 PROCEDURE — 25010000002 IRON SUCROSE PER 1 MG: Performed by: INTERNAL MEDICINE

## 2023-09-26 PROCEDURE — 96365 THER/PROPH/DIAG IV INF INIT: CPT

## 2023-09-26 RX ORDER — SODIUM CHLORIDE 9 MG/ML
250 INJECTION, SOLUTION INTRAVENOUS ONCE
Status: COMPLETED | OUTPATIENT
Start: 2023-09-26 | End: 2023-09-26

## 2023-09-26 RX ADMIN — SODIUM CHLORIDE 250 ML: 9 INJECTION, SOLUTION INTRAVENOUS at 11:00

## 2023-09-26 RX ADMIN — IRON SUCROSE 300 MG: 20 INJECTION, SOLUTION INTRAVENOUS at 11:00

## 2023-10-20 DIAGNOSIS — I10 PRIMARY HYPERTENSION: ICD-10-CM

## 2023-10-20 DIAGNOSIS — B96.89 ACUTE BACTERIAL BRONCHITIS: ICD-10-CM

## 2023-10-20 DIAGNOSIS — E53.8 VITAMIN B12 DEFICIENCY: Primary | ICD-10-CM

## 2023-10-20 DIAGNOSIS — H65.191 ACUTE MEE (MIDDLE EAR EFFUSION), RIGHT: ICD-10-CM

## 2023-10-20 DIAGNOSIS — Z76.0 MEDICATION REFILL: ICD-10-CM

## 2023-10-20 DIAGNOSIS — E55.9 VITAMIN D INSUFFICIENCY: ICD-10-CM

## 2023-10-20 DIAGNOSIS — J20.8 ACUTE BACTERIAL BRONCHITIS: ICD-10-CM

## 2023-10-20 RX ORDER — LISINOPRIL 5 MG/1
5 TABLET ORAL DAILY
Qty: 90 TABLET | Refills: 1 | Status: SHIPPED | OUTPATIENT
Start: 2023-10-20

## 2023-10-20 RX ORDER — CYANOCOBALAMIN 1000 UG/ML
1000 INJECTION, SOLUTION INTRAMUSCULAR; SUBCUTANEOUS
Qty: 1 ML | Refills: 1 | Status: SHIPPED | OUTPATIENT
Start: 2023-10-20

## 2023-10-20 RX ORDER — CETIRIZINE HYDROCHLORIDE 10 MG/1
10 TABLET ORAL DAILY
Qty: 30 TABLET | Refills: 0 | Status: SHIPPED | OUTPATIENT
Start: 2023-10-20

## 2023-10-20 RX ORDER — MULTIVIT-MIN/IRON/FOLIC ACID/K 18-600-40
2000 CAPSULE ORAL DAILY
Qty: 30 CAPSULE | Refills: 0 | Status: SHIPPED | OUTPATIENT
Start: 2023-10-20

## 2023-10-20 RX ORDER — OMEPRAZOLE 20 MG/1
20 CAPSULE, DELAYED RELEASE ORAL DAILY
Qty: 30 CAPSULE | Refills: 0 | Status: SHIPPED | OUTPATIENT
Start: 2023-10-20

## 2023-11-27 ENCOUNTER — OFFICE VISIT (OUTPATIENT)
Dept: OBSTETRICS AND GYNECOLOGY | Age: 43
End: 2023-11-27
Payer: COMMERCIAL

## 2023-11-27 ENCOUNTER — HOSPITAL ENCOUNTER (OUTPATIENT)
Facility: HOSPITAL | Age: 43
Discharge: HOME | End: 2023-11-27
Admitting: OBSTETRICS & GYNECOLOGY
Payer: COMMERCIAL

## 2023-11-27 VITALS
SYSTOLIC BLOOD PRESSURE: 110 MMHG | WEIGHT: 207 LBS | HEIGHT: 65 IN | BODY MASS INDEX: 34.49 KG/M2 | DIASTOLIC BLOOD PRESSURE: 74 MMHG

## 2023-11-27 DIAGNOSIS — Z11.51 SCREENING FOR HUMAN PAPILLOMAVIRUS (HPV): ICD-10-CM

## 2023-11-27 DIAGNOSIS — Z12.11 COLON CANCER SCREENING: Primary | ICD-10-CM

## 2023-11-27 DIAGNOSIS — Z12.31 ENCOUNTER FOR SCREENING MAMMOGRAM FOR MALIGNANT NEOPLASM OF BREAST: ICD-10-CM

## 2023-11-27 DIAGNOSIS — Z12.31 SCREENING MAMMOGRAM FOR BREAST CANCER: ICD-10-CM

## 2023-11-27 DIAGNOSIS — Z01.419 WELL FEMALE EXAM WITH ROUTINE GYNECOLOGICAL EXAM: ICD-10-CM

## 2023-11-27 DIAGNOSIS — Z12.4 SCREENING FOR MALIGNANT NEOPLASM OF CERVIX: ICD-10-CM

## 2023-11-27 PROCEDURE — 77067 SCR MAMMO BI INCL CAD: CPT

## 2023-11-27 PROCEDURE — 99386 PREV VISIT NEW AGE 40-64: CPT | Performed by: OBSTETRICS & GYNECOLOGY

## 2023-11-27 PROCEDURE — 77063 BREAST TOMOSYNTHESIS BI: CPT

## 2023-11-27 RX ORDER — OMEPRAZOLE 20 MG/1
20 CAPSULE, DELAYED RELEASE ORAL DAILY
Qty: 30 CAPSULE | Refills: 0 | Status: SHIPPED | OUTPATIENT
Start: 2023-11-27

## 2023-11-27 NOTE — PROGRESS NOTES
Subjective     Chief Complaint   Patient presents with    Gynecologic Exam     New AC discuss perimenopause symptoms. Last pap 2 years ago. Pt has MG scheduled here today.        History of Present Illness    Christy Castillo is a 43 y.o.  who presents for annual exam.  The patient is a new patient to me.  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  She has started to experience very heavy cycles.  She wears a super plus tampon and a pad and has to change it every 3 hours.  She was interested in trying something.  She is also had some night sweats.  She has hypertension.  She plans to start hiking for exercise.  Her menses are regular every 28-30 days, lasting  5-6 days  , dysmenorrhea none   Obstetric History:  OB History          3    Para   3    Term   3            AB        Living   2         SAB        IAB        Ectopic        Molar        Multiple        Live Births   2               Menstrual History:     Patient's last menstrual period was 2023.         Current contraception: vasectomy  History of abnormal Pap smear: yes - age 16  normal colp normal since   Received Gardasil immunization: no  Perform regular self breast exam : yes  Family history of uterine or ovarian cancer: no  Family History of colon cancer: yes - PGF 47   Family history of breast cancer: no    Mammogram: done today.  Colonoscopy: recommended.  DEXA: not indicated.    Exercise: no   Calcium/Vitamin D: adequate intake and uses supplements    The following portions of the patient's history were reviewed and updated as appropriate: allergies, current medications, past family history, past medical history, past social history, past surgical history, and problem list.    Review of Systems    Review of Systems   Constitutional: Negative for fatigue.   Respiratory: Negative for shortness of breath.    Gastrointestinal: Negative for abdominal pain.   Genitourinary: Negative for dysuria.   Neurological: Negative  "for headaches.   Psychiatric/Behavioral: Negative for dysphoric mood.     Objective   Physical Exam    /74   Ht 165.1 cm (65\")   Wt 93.9 kg (207 lb)   LMP 11/20/2023   BMI 34.45 kg/m²     General:   alert, appears stated age and cooperative   Neck: thyroid normal to palpation   Heart: regular rate and rhythm   Lungs: clear to auscultation bilaterally   Abdomen: soft, non-tender, without masses or organomegaly   Breast: inspection negative, no nipple discharge or bleeding, no masses or nodularity palpable   Vulva: normal, Bartholin's, Urethra, Gardiner's normal   Vagina: normal mucosa, normal discharge   Cervix: no cervical motion tenderness and no lesions   Uterus: non-tender, normal shape and consistency   Adnexa: no mass, fullness, tenderness   Rectal: normal rectal, no masses     Assessment & Plan   Diagnoses and all orders for this visit:    1. Colon cancer screening (Primary)  -     Ambulatory Referral For Screening Colonoscopy    2. Well female exam with routine gynecological exam  -     IGP, Apt HPV,rfx 16 / 18,45    3. Screening for human papillomavirus (HPV)  -     IGP, Apt HPV,rfx 16 / 18,45    4. Screening for malignant neoplasm of cervix  -     IGP, Apt HPV,rfx 16 / 18,45    5. Screening mammogram for breast cancer  -     Mammo Screening Digital Tomosynthesis Bilateral With CAD; Future    We discussed heavy menstrual bleeding.  Patient has a history of anemia and has to receive iron infusions.  We discussed possible treatments.  Patient is most interested in the Mirena IUD.  We discussed potential risk of the IUD including expulsion and uterine perforation.  Patient will return for IUD placement in 2 weeks.  Patient was referred for colonoscopy  Patient has a family history of young colon cancer.  Genetic testing was sent off today.  Encouraged exercise.    All questions answered.  Breast self exam technique reviewed and patient encouraged to perform self-exam monthly.  Discussed healthy lifestyle " modifications.  Recommended 30 minutes of aerobic exercise five times per week.  Discussed calcium needs to prevent osteoporosis.

## 2023-11-28 ENCOUNTER — PATIENT ROUNDING (BHMG ONLY) (OUTPATIENT)
Dept: OBSTETRICS AND GYNECOLOGY | Age: 43
End: 2023-11-28
Payer: COMMERCIAL

## 2023-11-28 NOTE — PROGRESS NOTES
A MY CHART MESSAGE HAS BEEN SENT TO THE PATIENT FOR [facility]OUND[facility].

## 2023-11-29 LAB
CYTOLOGIST CVX/VAG CYTO: NORMAL
CYTOLOGY CVX/VAG DOC CYTO: NORMAL
CYTOLOGY CVX/VAG DOC THIN PREP: NORMAL
DX ICD CODE: NORMAL
HIV 1 & 2 AB SER-IMP: NORMAL
HPV I/H RISK 4 DNA CVX QL PROBE+SIG AMP: NEGATIVE
OTHER STN SPEC: NORMAL
STAT OF ADQ CVX/VAG CYTO-IMP: NORMAL

## 2023-12-18 ENCOUNTER — OFFICE VISIT (OUTPATIENT)
Dept: OBSTETRICS AND GYNECOLOGY | Age: 43
End: 2023-12-18
Payer: COMMERCIAL

## 2023-12-18 VITALS
HEIGHT: 65 IN | BODY MASS INDEX: 34.32 KG/M2 | SYSTOLIC BLOOD PRESSURE: 120 MMHG | DIASTOLIC BLOOD PRESSURE: 74 MMHG | WEIGHT: 206 LBS

## 2023-12-18 DIAGNOSIS — Z30.430 ENCOUNTER FOR IUD INSERTION: ICD-10-CM

## 2023-12-18 DIAGNOSIS — Z01.812 PRE-PROCEDURE LAB EXAM: Primary | ICD-10-CM

## 2023-12-18 LAB
B-HCG UR QL: NEGATIVE
EXPIRATION DATE: NORMAL
INTERNAL NEGATIVE CONTROL: NEGATIVE
INTERNAL POSITIVE CONTROL: POSITIVE
Lab: NORMAL

## 2023-12-18 NOTE — PROGRESS NOTES
IUD Insertion    Patient's last menstrual period was 12/17/2023.    Date of procedure:  12/18/2023    Risks and benefits discussed? yes  All questions answered? yes  Consents given by The patient  Written consent obtained? yes    Local anesthesia used:  no    Procedure documentation:    After verifying the patient had a low probability of being pregnant and met the criteria for insertion, a sterile speculum has placed and the cervix was cleansed with an antiseptic solution.  Vaginal discharge was scant.  The anterior lip of the cervix was grasped with a tenaculum and the uterine cavity was gently sounded. There was no difficulty passing the sound through the cervix.  Cervical dilation did not need to be performed prior to placing the IUD.  The uterus was midposition and sounded to 9 cms.  The Mirena was then prepared per the manufacturers instructions.    The Mirena was advanced to a point 2 cms from the fundus and then the arms were released from the sheath.  The device was advanced to the fundus and the device was released fully from the sheath. The string was cut 3 cms in length.  Bleeding from the cervix was scant.    She tolerated the procedure without any difficulty.     Post procedure instructions: I discussed irregular bleeding and cramping that is common with placement of IUD.  Patient will follow-up in 6 weeks for IUD check.  She is not able to take NSAIDs so she will take Tylenol.  We discussed risk of expulsion.       December 18, 2023

## 2023-12-21 NOTE — PROGRESS NOTES
HEMATOLOGY ONCOLOGY OUTPATIENT FOLLOWUP      Patient name: Christy Castillo  : 1980  MRN: [id number]  Primary Care Physician: Grover Ventura APRN  Referring Physician: Alda Cardona AP*  Reason For Consult:     Chief Complaint   Patient presents with    Follow-up     Iron deficiency anemia, unspecified iron deficiency anemia type         HPI:   History of Present Illness:  Christy Castillo is 43 y.o. female who presented to our office on 22 for consultation regarding Iron deficiency Anemia    Patient had labs drawn in 2022     Hb 9.5 , hct 32.3, MCV 77.8, plt 372, no prior CBC recently. 3-4 yrs ago hemoglobin was normal.  Patient was given oral iron.    2022 - Hb 10.1, hct 34, MCV 76.2, plt 379, vitamin B12 336, ferritin 6.31, iron sat 5, TIBC 550  Patient has been taking oral iron. No rectal bleeding.   2022 - iv venofer x4   2022 - Hb 11.6, WBC 7.94  2023 -iron saturation 4, TIBC 578, hemoglobin 9.6, hematocrit 33.5, platelet 398 vitamin B12 154  23 - iron venofer x4    Subjective:  Fatigue has improved.     The following portions of the patient's history were reviewed and updated as appropriate: allergies, current medications, past family history, past medical history, past social history, past surgical history and problem list.    Past Medical History:   Diagnosis Date    Abnormal Pap smear of cervix     Anemia 2017?    RNY gastric bypass. Periodically get ferritin infusions    Edema     Fatigue     Gall stones     Gastric band slippage     RESOLVED    History of colposcopy     Hypertension     Atypical. Was in pain with this BP    Joint pain     Joint pain, knee     Obesity, morbid     Plantar fasciitis     HISTORY    PONV (postoperative nausea and vomiting)     SEVERE N/V WITH GAS WITH GASTRIC BANDING    Pseudotumor cerebri     Urge and stress incontinence     UTI (urinary tract infection)        Past Surgical History:   Procedure Laterality  Date    BREAST BIOPSY Right     @[facility]---no clip placed    CHOLECYSTECTOMY      COLONOSCOPY      Fiberoptic    GASTRIC BANDING REMOVAL      Laparosc Restrictive Proc Adjustable Gastric Band Removal    LAPAROSCOPIC GASTRIC BANDING      Laparosc Restrictive Proc Adjustable Gastric Band Placement    [place] LAPS GSTR RSTCV PX W/BYP ANCA-EN-Y LIMB <150 CM N/A 05/02/2016    Procedure: REVISION GASTRIC BYPASS LAPAROSCOPIC , PREVIOUS LAP BAND,WITH LYSIS OF ADHESIONS;  Surgeon: Quinn Enrique Jr., MD;  [facility]: [facility];  Service: General         Current Outpatient Medications:     Cholecalciferol (Vitamin D) 50 MCG (2000 UT) capsule, Take 1 capsule by mouth Daily., Disp: 30 capsule, Rfl: 0    cyanocobalamin 1000 MCG/ML injection, Inject 1 mL into the appropriate muscle as directed by prescriber Every 30 (Thirty) Days. 1st of the month, Disp: 1 mL, Rfl: 1    Levonorgestrel (Mirena, 52 MG,) 20 MCG/DAY intrauterine device IUD, To be inserted one time by prescriber. Route intrauterine., Disp: 1 each, Rfl: 0    lisinopril (PRINIVIL,ZESTRIL) 5 MG tablet, Take 1 tablet by mouth Daily., Disp: 90 tablet, Rfl: 1    Multiple Vitamins-Minerals (MULTIVITAMIN ADULT PO), Take 1 tablet by mouth Daily., Disp: , Rfl:     omeprazole (priLOSEC) 20 MG capsule, Take 1 capsule by mouth Daily., Disp: 30 capsule, Rfl: 0    Allergies   Allergen Reactions    Other Nausea And Vomiting     Anesthesia gas     Only gas-- not iv       Family History   Problem Relation Age of Onset    Hypertension Father     Obesity Mother     Sleep apnea Mother     Hypertension Mother     Heart disease Paternal Grandfather     Colon cancer Paternal Grandfather 45    Heart disease Paternal Grandmother     Heart disease Maternal Grandmother     Heart disease Maternal Grandfather        Cancer-related family history includes Colon cancer (age of onset: 45) in her paternal grandfather.    Social History     Tobacco Use    Smoking status: Never    Smokeless tobacco: Never  "  Vaping Use    Vaping Use: Never used   Substance Use Topics    Alcohol use: Yes     Alcohol/week: 2.0 standard drinks of alcohol     Types: 2 Drinks containing 0.5 oz of alcohol per week     Comment: Have cut down to zero    Drug use: No     Social History     Social History Narrative    Not on file          Objective:    Vitals:    12/26/23 0912   BP: 139/79   Pulse: 91   Resp: 18   Temp: 98.3 °F (36.8 °C)   SpO2: 96%   Weight: 95.1 kg (209 lb 9.6 oz)   Height: 165.1 cm (65\")   PainSc: 0-No pain       Body mass index is 34.88 kg/m².  ECOG  (0) Fully active, able to carry on all predisease performance without restriction    Physical Exam:     Physical Exam  Constitutional:       Appearance: Normal appearance.   HENT:      Head: Normocephalic and atraumatic.      Mouth/Throat:      Mouth: Mucous membranes are moist.   Eyes:      Pupils: Pupils are equal, round, and reactive to light.   Cardiovascular:      Rate and Rhythm: Normal rate and regular rhythm.      Pulses: Normal pulses.      Heart sounds: Normal heart sounds. No murmur heard.  Pulmonary:      Effort: Pulmonary effort is normal.      Breath sounds: Normal breath sounds.   Abdominal:      General: There is no distension.      Palpations: Abdomen is soft. There is no mass.      Tenderness: There is no abdominal tenderness.   Musculoskeletal:         General: Normal range of motion.      Cervical back: Normal range of motion and neck supple.   Skin:     General: Skin is warm.   Neurological:      General: No focal deficit present.      Mental Status: She is alert.   Psychiatric:         Mood and Affect: Mood normal.           Lab Results - Last 18 Months   Lab Units 08/18/23  0834 05/22/23  0850 12/20/22  0811   WBC 10*3/mm3 6.82 6.53 5.24   HEMOGLOBIN g/dL 9.6* 10.2* 12.0   HEMATOCRIT % 33.5* 33.8* 38.3   PLATELETS 10*3/mm3 398 403 284   MCV fL 76.3* 76.6* 90.1     Lab Results - Last 18 Months   Lab Units 05/22/23  0850 10/06/22  1228   SODIUM mmol/L 136 " "137   POTASSIUM mmol/L 4.6 3.7   CHLORIDE mmol/L 101 98   CO2 mmol/L 27.0 27.0   BUN mg/dL 7 7   CREATININE mg/dL 0.55* 0.69   CALCIUM mg/dL 9.1 9.4   BILIRUBIN mg/dL 0.4 0.7   ALK PHOS U/L 74 82   ALT (SGPT) U/L 41* 15   AST (SGOT) U/L 29 17   GLUCOSE mg/dL 90 100*       Lab Results   Component Value Date    GLUCOSE 90 05/22/2023    BUN 7 05/22/2023    CREATININE 0.55 (L) 05/22/2023    EGFRIFNONA 96 02/21/2022    EGFRIFAFRI >60 12/31/2020    BCR 12.7 05/22/2023    K 4.6 05/22/2023    CO2 27.0 05/22/2023    CALCIUM 9.1 05/22/2023    ALBUMIN 4.2 05/22/2023    AST 29 05/22/2023    ALT 41 (H) 05/22/2023       No results for input(s): \"APTT\", \"INR\", \"PTT\" in the last 11098 hours.    Lab Results   Component Value Date    IRON 21 (L) 08/18/2023    TIBC 578 (H) 08/18/2023    FERRITIN 8.13 (L) 08/18/2023       Lab Results   Component Value Date    FOLATE 5.70 11/04/2016       No results found for: \"OCCULTBLD\"    No results found for: \"RETICCTPCT\"    Lab Results   Component Value Date    [id number] 154 (L) 08/18/2023     No results found for: \"SPEP\", \"UPEP\"  No results found for: \"LDH\", \"URICACID\"  No results found for: \"MARTIN\", \"RF\", \"SEDRATE\"  No results found for: \"FIBRINOGEN\", \"HAPTOGLOBIN\"    No results found for: \"PTT\", \"INR\"  No results found for: \"\"  No results found for: \"CEA\"  No components found for: \"CA-19-9\"  No results found for: \"PSA\"   No results found for: \"AFPTM\", \"OU8325\", \"HCGTM\", \"SPEP\", \"MIKEY\"         Assessment & Plan     Patient is a 43 yr old female with microcytic anemia secondary to iron deficiency.     Iron deficiency Anemia  Microcytic anemia with elevated TIBC and low iron sat points to severe iron deficiency.  With oral iron malabsorption, was given iv iron infusion with venofer for 4 doses.  had improvement   Likely reason is poor absorption with gastric bypass. She has oral iron intolerance  Now with low iron saturation, low ferritin patient was given  IV iron infusion Venofer for 4 " treatments  Now pending labs today .     Menorrhagia  Had Mirena placed, improved.    Vitamin B12 injection  Continues to be on montly.        Thank you very much for providing the opportunity to participate in this patient’s care. Please do not hesitate to call if there are any other questions  George Lipscomb MD

## 2023-12-26 ENCOUNTER — OFFICE VISIT (OUTPATIENT)
Dept: ONCOLOGY | Facility: CLINIC | Age: 43
End: 2023-12-26
Payer: COMMERCIAL

## 2023-12-26 VITALS
RESPIRATION RATE: 18 BRPM | TEMPERATURE: 98.3 F | BODY MASS INDEX: 34.92 KG/M2 | WEIGHT: 209.6 LBS | OXYGEN SATURATION: 96 % | HEIGHT: 65 IN | SYSTOLIC BLOOD PRESSURE: 139 MMHG | DIASTOLIC BLOOD PRESSURE: 79 MMHG | HEART RATE: 91 BPM

## 2023-12-26 DIAGNOSIS — D50.9 IRON DEFICIENCY ANEMIA, UNSPECIFIED IRON DEFICIENCY ANEMIA TYPE: ICD-10-CM

## 2023-12-26 DIAGNOSIS — K90.9 MALABSORPTION OF IRON: Primary | ICD-10-CM

## 2024-01-15 ENCOUNTER — E-VISIT (OUTPATIENT)
Dept: FAMILY MEDICINE CLINIC | Facility: TELEHEALTH | Age: 44
End: 2024-01-15
Payer: COMMERCIAL

## 2024-01-15 RX ORDER — OMEPRAZOLE 20 MG/1
20 CAPSULE, DELAYED RELEASE ORAL DAILY
Qty: 30 CAPSULE | Refills: 0 | Status: SHIPPED | OUTPATIENT
Start: 2024-01-15

## 2024-01-15 NOTE — EXTERNAL PATIENT INSTRUCTIONS
Diagnosis   Urinary tract infection (UTI)   My name is ROYCE Shanks. I'm a healthcare provider at [facility]. After reviewing your interview, I see you have a urinary tract infection (UTI).   Medications   Your pharmacy   [facility] [id number] [place] [address] [place] [facility] [phone]     Prescription   Nitrofurantoin monohydrate/macrocrystalline (100mg): Take 1 capsule by mouth every 12 hours for 5 days for infection. This medication is an antibiotic. Take it exactly as directed. You must finish the entire course of medication, even if you feel better after taking the first few doses.   Phenazopyridine (200mg): Take 1 tablet by mouth 3 times a day as needed for 2 days for pain or discomfort associated with your condition.   Fluconazole (150mg): Take 1 tablet by mouth once for 1 day as a single dose. Use this medication only if you develop a yeast infection. If yeast infection symptoms are still present 3 days after taking the first tablet, take the second tablet.    I've given you a prescription dose of phenazopyridine. If it's more affordable or convenient, you may use the equivalent amount of non-prescription phenazopyridine. For example, instead of taking one 200 mg phenazopyridine tablet, you may take two 95 mg phenazopyridine tablets.    Because you've developed yeast infections after taking antibiotics in the past, I've prescribed Diflucan (fluconazole). Diflucan is an oral antifungal that treats yeast infections. Use this medication only if you develop a yeast infection.   About your diagnosis   A UTI is an infection of one or more parts of the urinary tract, most commonly the bladder.   Most UTIs are caused by bacteria (usually E. coli) that travel up the urethra and into the bladder. I see that you have some common signs and symptoms of a UTI:    Pain or burning while urinating    Frequent urination    Sudden urge to urinate    Symptoms that feel a lot like past UTIs    Symptoms  that began shortly after sexual intercourse   Fortunately, most UTIs aren't serious, and they're easily treated with antibiotics. Make sure you take all of the antibiotic pills given to you, even if you start to feel better after the first few doses. Otherwise, the UTI might come back.   What to expect   If you follow this treatment plan, you should start to feel better within 1 to 2 days.   When to seek care   Call us at [phone]   with any sudden or unexpected symptoms.    Symptoms that don't improve or get worse in the next 48 hours    Fever that goes above 101F or lasts longer than 24 hours    Shaking or chills    Nausea or vomiting    Severe flank pain (pain in your back or side)   Other treatment    Rest and drink plenty of water    Urinate frequently and when you first feel the urge    Place a heating pad on your back or stomach to help relieve some of the discomfort   Prevention    Drink a lot of liquids to help flush bacteria from your system. Water is best. Try for six to eight, 8-ounce glasses a day on a regular basis.    Urinate often and when you first feel the urge. Bacteria can grow when urine stays in the bladder too long. Urinate after sex to flush away bacteria.    After using the toilet, always wipe from front to back. This step is most important after a bowel movement. Wiping from front to back prevents bacteria normally found in stool from entering the urinary tract.   Your provider   Your diagnosis was provided by ROYCE Shanks, a member of your trusted care team at [facility].   If you have any questions, call us at [phone]  .

## 2024-01-15 NOTE — E-VISIT TREATED
Chief Complaint: Bladder infection (UTI)   Patient introduction   Patient is 43-year-old female.   Patient has had dysuria, frequent urination, and urinary urgency for 1 to 3 days.   Urine is yellow with no unusual odor.   General presentation   No fever. No nausea or vomiting.   No stomach/pelvic pain.   No back pain.   No flank pain.   Has not taken antibiotics for current symptoms.   The following treatments were helpful for current symptoms:    Phenazopyridine   The following treatments were not helpful for current symptoms:    Acetaminophen    Ibuprofen   Previous history of UTI. Current symptoms feel exactly the same as previous UTIs. Received treatment for UTI 1 to 3 times in last year. Patient's last use of antibiotics for UTI was more than 3 months ago.   Previously developed yeast infections as a result of taking antibiotics for past UTIs.   No history of pyelonephritis. No history of kidney stones.   Had sexual intercourse in the past week. Does not use diaphragm. No unprotected sexual intercourse with a new partner in the last 2 weeks.   Patient is not being treated for diabetes mellitus.   Review of red flags/alarm symptoms:    No recent hospitalizations or nursing home care (last 3 months)    No history of renal failure    No recent history of urologic instrumentation    No anatomic abnormalities of the urinary tract    No abnormal vaginal discharge    No visible vaginal sores    No pain with sexual intercourse    No abnormal vaginal bleeding or spotting   Pregnancy/menstrual status/breastfeeding:   Patient is not pregnant. Patient is not breastfeeding. Regarding date of last menstrual period, patient writes: 1/11/24.   Current medications   Currently taking cyanocobalamin 1000 MCG/ML injection, Vitamin D3 50 MCG (2000 UT) capsule, multivitamin with minerals tablet tablet, Mirena (52 MG) 20 MCG/DAY intrauterine device IUD, lisinopril 5 MG tablet, and omeprazole 20 MG capsule.   Medication  allergies   None.   Medication contraindication review   Patient is currently taking an ACE inhibitor. Therefore, medications containing trimethoprim will not be prescribed.   Patient is being treated for high blood pressure. Therefore, the following medication(s) will not be prescribed:    Ciprofloxacin   No known history of amoxicillin-clavulanate-associated cholestatic jaundice or nitrofurantoin-associated cholestatic jaundice.   Past medical history   Immune conditions: No immunocompromising conditions.   No history of cancer.   Patient-submitted comments   Patient was asked if they had anything to add about their symptoms. Patient writes: Please provide a diflucan as well in case I develop a yeast infx. Thanks .   Patient did not request an excuse note.   Assessment   Uncomplicated acute UTI.   This is the likely diagnosis based on patient's interview responses, including:    Symptom profile    Previous history of UTI    Current symptoms are exactly the same as previous UTIs   No recent history of kidney stones.   Plan   Medications:    nitrofurantoin monohydrate/macrocrystals 100 mg capsule RX 100mg 1 cap PO q12h 5d for infection. This medication is an antibiotic. Take it exactly as directed. You must finish the entire course of medication, even if you feel better after taking the first few doses. Amount is 10 cap.    phenazopyridine 200 mg tablet RX 200mg 1 tab PO tid PRN 2d for pain or discomfort associated with your condition. Amount is 6 tab.    fluconazole 150 mg tablet RX 150mg 1 tab PO once 1d as a single dose for vaginal yeast infection. Repeat in 72 hours if symptoms persist. Amount is 2 tab.   The patient's prescriptions will be sent to:   [id number]   [address] [place] [address] [place] [address]   Phone: [phone]     Fax: [phone]   Education:    Condition and causes    Prevention    Treatment and self-care    When to call provider   Follow-up:   Patient to follow up as needed  for progression or lack of improvement in symptoms within 3d.   ----------   Electronically signed by ROYCE Shanks on 2024-01-15 at 07:07AM   ----------   Patient Interview Transcript:   Knowing about your anatomy is important for diagnosing and treating UTIs. The gender we have on file for you is female, but we realize that this might not tell the whole story. Would you like to tell us more about your anatomy?    No   Not selected:    Yes   Which of these symptoms do you have? Select all that apply.    Pain or burning while urinating    Frequent urination    __Sudden urge to urinate and it's hard to hold the urine in __   How long have you had these symptoms? Select one.    1 to 3 days   Not selected:    Less than 24 hours    4 to 6 days    7 to 10 days    More than 10 days   Have you already started taking antibiotics for your current symptoms? Select one.    No   Not selected:    Yes   Since your current symptoms started, has it been difficult to start, stop, or delay urination? Select one.    No   Not selected:    Yes   What color is your urine? Select one.    Yellow   Not selected:    Clear    Cloudy    Pink or red   Does your urine smell strange (like ammonia) or stronger than usual? Select one.    No   Not selected:    Yes   Do you also have any of these symptoms? Select all that apply.    No   Not selected:    Fever    Nausea    Vomiting    Pain, pressure, or discomfort in the lower abdomen    Back pain   Do you have any flank pain? The flank is the side of the body between the ribs and the hips.    No   Not selected:    Yes, in my left flank    Yes, in my right flank    Yes, in both my left and right flanks   Do you have any of these vaginal symptoms? Select all that apply.    No   Not selected:    Abnormal vaginal itching    Unscheduled or abnormal vaginal bleeding or spotting    Pain during sex    Visible sores on the vagina    Abnormal vaginal discharge   In the past 2 weeks, have you had a medical  device or instrument placed in your urinary tract? Examples include catheters, stents, and nephrostomy tubes. Select one.    No   Not selected:    Yes   Have you recently been hospitalized or been a resident of a nursing home or other long-term care facility? This doesn't include emergency room (ER) visits. Select one.    No   Not selected:    Yes, within the last 2 weeks    Yes, within the last 3 months   Kidney failure    No   Not selected:    Within the last year    More than a year ago   Kidney infection (pyelonephritis)    No   Not selected:    Within the last year    More than a year ago   Kidney stones    No   Not selected:    Within the last year    More than a year ago   Kidney transplant    No   Not selected:    Within the last year    More than a year ago   Have you ever been diagnosed with any of these? Select all that apply.    No   Not selected:    Urinary reflux    Bladder diverticula    Single (or horseshoe) kidney    Duplicated urethra   Have you recently held your urine for a long time after you felt the urge to go? Select one.    No   Not selected:    Yes   Have you recently avoided eating or drinking so you wouldn't have the urge to urinate as often? Select one.    No   Not selected:    Yes   Do you use a diaphragm? Select one.    No   Not selected:    Yes   Have you gone through menopause? Select one.    No   Not selected:    Yes    I'm going through it now   Are you pregnant? Select one.    No   Not selected:    Yes   When was your last menstrual period? If you don't currently have periods or no longer have periods, please briefly explain.    1/11/24   Are you breastfeeding? Select one.    No   Not selected:    Yes   Have you had sexual intercourse in the past week? Recent sexual intercourse is a risk factor for urinary tract infections. Select one.    Yes   Not selected:    No   Have you had unprotected sexual intercourse with a new partner in the last 2 weeks? Select one.    No   Not  selected:    Yes   Have you traveled to any of these countries within the last 3 months? Recent travel to these countries may affect which medication we recommend for your symptoms. Select all that apply.    None of these   Not selected:    [place]    [place]    [place]    [place]   Acetaminophen (Tylenol)    Not helpful   Not selected:    Helpful   Ibuprofen (Advil, Motrin)    Not helpful   Not selected:    Helpful   Phenazopyridine (Azo, Baridium, Pyridium, Uricalm, Uristat)    Helpful   Not selected:    Not helpful   Have you ever had a urinary tract infection (UTI)? A UTI is often called a bladder infection or acute cystitis. Select one.    Yes   Not selected:    No, not that I know of   How much do your current symptoms feel like past UTIs? Select one.    Exactly the same   Not selected:    Mostly the same    Somewhat the same    Totally different   In the past year, how many times have you taken antibiotics for a UTI? Select one.    1 to 3   Not selected:    0    4 or more   When did you last take antibiotics for a UTI? Select one.    More than 3 months ago   Not selected:    Within the last 3 months   Have you ever developed a yeast infection as a result of taking antibiotics? Select one.    Yes   Not selected:    No, not that I know of   UTIs may be more serious when other factors are present. Let's address those now. Are you being treated for type 1 or type 2 diabetes? Select one.    No   Not selected:    Yes   Do you have any of these conditions that can affect the immune system? Scroll to see all options. Select all that apply.    None of these   Not selected:    History of bone marrow transplant    Chronic kidney disease    Chronic liver disease (including cirrhosis)    HIV/AIDS    Inflammatory bowel disease (Crohn's disease or ulcerative colitis)    Lupus    Moderate to severe plaque psoriasis    Multiple sclerosis    Rheumatoid arthritis    Sickle cell anemia    Alpha or beta thalassemia    History of  kidney, liver, heart, or other solid organ transplant    History of liver, heart, or other solid organ transplant    History of spleen removal    An autoimmune disorder not listed here (specify)    A condition requiring treatment with long-term use of oral steroids (such as prednisone, prednisolone, or dexamethasone) (specify)   Have you ever been diagnosed with cancer? Select one.    No   Not selected:    Yes, I have cancer now    Yes, but I'm in remission   These last few questions will help us create the right treatment plan for you. Are you being treated for any of these conditions? Select all that apply.    High blood pressure   Not selected:    Yamilka-Danlos syndrome    Folate deficiency    G6PD deficiency    History of aortic aneurysm or dissection    Marfan syndrome    Megaloblastic anemia    Mono (mononucleosis)    Myasthenia gravis    Oliguria or anuria    Peripheral vascular disease    No   Have you ever had jaundice as a result of taking amoxicillin-clavulanate (Augmentin) or nitrofurantoin (Macrobid)? Select all that apply.    No   Not selected:    Yes, from amoxicillin-clavulanate (Augmentin)    Yes, from nitrofurantoin (Macrobid, Macrodantin)   Are you taking any of these medications? Select all that apply.    An ACE inhibitor such as lisinopril, enalapril, captopril, or benazepril   Not selected:    An angiotensin II receptor blocker (ARB) such as candesartan, irbesartan, losartan, or valsartan    No   Are you still taking these medications listed in your medical record? If you're not taking any of these, click Next. Select all that apply.    cyanocobalamin 1000 MCG/ML injection    Vitamin D3 50 MCG (2000 UT) capsule    multivitamin with minerals tablet tablet    Mirena (52 MG) 20 MCG/DAY intrauterine device IUD    lisinopril 5 MG tablet    omeprazole 20 MG capsule   Are you taking any other medications, vitamins, or supplements? Select one.    No   Not selected:    Yes   Have you ever had an  allergic or bad reaction to any medication? Select one.    No   Not selected:    Yes   Do you need a doctor's note? A doctor's note confirms that you received care today and states when you can return to school or work. It does not contain information about your diagnosis or treatment plan. Your provider will make the final decision on whether to give you a doctor's note. Doctor's notes CANNOT be backdated. Select one.    No   Not selected:    Today only (1 day)    Today and tomorrow (2 days)    3 days   Is there anything you'd like to add about your symptoms? Please limit your comments to the symptoms asked about in this interview. If you include comments about other concerns, your provider may recommend that you be seen in person.    __Please provide a diflucan as well in case I develop a yeast infx. Thanks __   ----------   Medical history   Medical history data does not currently exist for this patient.

## 2024-01-29 ENCOUNTER — OFFICE VISIT (OUTPATIENT)
Dept: OBSTETRICS AND GYNECOLOGY | Age: 44
End: 2024-01-29
Payer: COMMERCIAL

## 2024-01-29 VITALS
DIASTOLIC BLOOD PRESSURE: 74 MMHG | SYSTOLIC BLOOD PRESSURE: 108 MMHG | WEIGHT: 208 LBS | BODY MASS INDEX: 34.66 KG/M2 | HEIGHT: 65 IN

## 2024-01-29 DIAGNOSIS — Z30.431 IUD CHECK UP: Primary | ICD-10-CM

## 2024-01-29 PROBLEM — Z97.5 IUD (INTRAUTERINE DEVICE) IN PLACE: Status: ACTIVE | Noted: 2024-01-29

## 2024-01-29 PROCEDURE — 99212 OFFICE O/P EST SF 10 MIN: CPT | Performed by: OBSTETRICS & GYNECOLOGY

## 2024-01-29 NOTE — PROGRESS NOTES
"  Chief complaint-IUD check    History of present illness- Patient is a 43 y.o.  who is happy with her IUD.  She has a Mirena IUD.  She has had some irregular bleeding but no significant pain.  She wishes to continue.        /74   Ht 165.1 cm (65\")   Wt 94.3 kg (208 lb)   BMI 34.61 kg/m²   Physical Exam  Constitutional:       General: She is not in acute distress.     Appearance: She is not ill-appearing.   Genitourinary:     Comments: On sterile speculum exam the IUD string is seen and is of normal length.  On bimanual exam there is no cervical motion tenderness or adnexal masses palpated.  Psychiatric:         Mood and Affect: Mood normal.         Thought Content: Thought content normal.         Judgment: Judgment normal.             Diagnoses and all orders for this visit:    1. IUD check up (Primary)    IUD is having typical irregular bleeding that we would expect.  Patient is overall happy with IUD and wishes to continue.  Discussed that typically periods will lighten given time.  She will call with any problems.  "

## 2024-02-05 ENCOUNTER — HOSPITAL ENCOUNTER (OUTPATIENT)
Dept: GENERAL RADIOLOGY | Facility: HOSPITAL | Age: 44
Discharge: HOME | End: 2024-02-05
Admitting: NURSE PRACTITIONER
Payer: COMMERCIAL

## 2024-02-05 ENCOUNTER — OFFICE VISIT (OUTPATIENT)
Dept: FAMILY MEDICINE CLINIC | Facility: CLINIC | Age: 44
End: 2024-02-05
Payer: COMMERCIAL

## 2024-02-05 VITALS
TEMPERATURE: 98 F | HEIGHT: 65 IN | WEIGHT: 206.6 LBS | RESPIRATION RATE: 16 BRPM | DIASTOLIC BLOOD PRESSURE: 81 MMHG | BODY MASS INDEX: 34.42 KG/M2 | HEART RATE: 74 BPM | SYSTOLIC BLOOD PRESSURE: 120 MMHG | OXYGEN SATURATION: 97 %

## 2024-02-05 DIAGNOSIS — M25.521 RIGHT ELBOW PAIN: ICD-10-CM

## 2024-02-05 DIAGNOSIS — M25.521 RIGHT ELBOW PAIN: Primary | ICD-10-CM

## 2024-02-05 PROCEDURE — 73080 X-RAY EXAM OF ELBOW: CPT

## 2024-02-05 PROCEDURE — 99214 OFFICE O/P EST MOD 30 MIN: CPT | Performed by: NURSE PRACTITIONER

## 2024-02-05 RX ORDER — METHYLPREDNISOLONE 4 MG/1
TABLET ORAL
Qty: 21 TABLET | Refills: 0 | Status: SHIPPED | OUTPATIENT
Start: 2024-02-05

## 2024-02-05 NOTE — PROGRESS NOTES
Chritsy TORRES [facility]  [id number]  1980  female     02/05/2024      Chief Complaint  Elbow Pain (R elbow pain that started around 6-8 weeks ago. Patient is unable to take NSAIDS due to gastric bypass. Hurts to bend and the time. Sometimes it is hard to pick objects up)    History of Present Illness  43-year-old female patient presents today complaining of right elbow pain.  States elbow pain started roughly 6 to 8 weeks ago.  Patient states she is unable to take NSAIDs due to her gastric bypass.  Patient states her right elbow hurts to bend at times.  Patient states at times it is hard picking up objects.  Denies trauma or injury to affected area that she knows of.  States she is right-hand dominant.  Denies warmth, swelling, bruising, numbness, tingling, abrasion.  Denies any other symptoms.  States she tolerates steroids well.  Denies any chances of being pregnant.  Elbow Pain  Associated symptoms include arthralgias (right elbow pain). Pertinent negatives include no joint swelling, myalgias or neck pain.       Review of Systems   Constitutional: Negative.    HENT: Negative.     Eyes: Negative.    Respiratory: Negative.     Cardiovascular: Negative.    Gastrointestinal: Negative.    Endocrine: Negative.    Genitourinary: Negative.    Musculoskeletal:  Positive for arthralgias (right elbow pain). Negative for back pain, gait problem, joint swelling, myalgias, neck pain and neck stiffness.   Skin: Negative.    Neurological: Negative.    Hematological: Negative.    Psychiatric/Behavioral: Negative.         Past Medical History:   Diagnosis Date    Abnormal Pap smear of cervix     Anemia 2017?    RNY gastric bypass. Periodically get ferritin infusions    Edema     Fatigue     Gall stones     Gastric band slippage     RESOLVED    History of colposcopy     Hypertension     Atypical. Was in pain with this BP    Joint pain     Joint pain, knee     Obesity, morbid     Plantar fasciitis     HISTORY    PONV (postoperative  "nausea and vomiting)     SEVERE N/V WITH GAS WITH GASTRIC BANDING    Pseudotumor cerebri     Urge and stress incontinence     UTI (urinary tract infection)        Past Surgical History:   Procedure Laterality Date    BREAST BIOPSY Right     @CM---no clip placed    CHOLECYSTECTOMY      COLONOSCOPY      Fiberoptic    GASTRIC BANDING REMOVAL      Laparosc Restrictive Proc Adjustable Gastric Band Removal    INTRAUTERINE DEVICE INSERTION      LAPAROSCOPIC GASTRIC BANDING      Laparosc Restrictive Proc Adjustable Gastric Band Placement    CA LAPS GSTR RSTCV PX W/BYP ANCA-EN-Y LIMB <150 CM N/A 05/02/2016    Procedure: REVISION GASTRIC BYPASS LAPAROSCOPIC , PREVIOUS LAP BAND,WITH LYSIS OF ADHESIONS;  Surgeon: Quinn Enrique Jr., MD;  Location: [facility] OR [facility];  Service: General       Family History   Problem Relation Age of Onset    Hypertension Father     Obesity Mother     Sleep apnea Mother     Hypertension Mother     Heart disease Paternal Grandfather     Colon cancer Paternal Grandfather 45    Heart disease Paternal Grandmother     Heart disease Maternal Grandmother     Heart disease Maternal Grandfather        Social History     Socioeconomic History    Marital status:    Tobacco Use    Smoking status: Never    Smokeless tobacco: Never   Vaping Use    Vaping Use: Never used   Substance and Sexual Activity    Alcohol use: Yes     Alcohol/week: 2.0 standard drinks of alcohol     Types: 2 Drinks containing 0.5 oz of alcohol per week     Comment: occ    Drug use: No    Sexual activity: Yes     Partners: Male     Birth control/protection: Vasectomy, I.U.D.     Comment: vasectomy        Allergies   Allergen Reactions    Other Nausea And Vomiting     Anesthesia gas     Only gas-- not iv         Objective   Vital Signs:   /81 (BP Location: Left arm, Patient Position: Sitting, Cuff Size: Adult)   Pulse 74   Temp 98 °F (36.7 °C) (Infrared)   Resp 16   Ht 165.1 cm (65\")   Wt 93.7 kg (206 lb 9.6 oz)   SpO2 " 97%   BMI 34.38 kg/m²       Physical Exam  Vitals and nursing note reviewed.   Constitutional:       General: She is not in acute distress.     Appearance: Normal appearance. She is not ill-appearing, toxic-appearing or diaphoretic.   HENT:      Head: Normocephalic and atraumatic.      Jaw: There is normal jaw occlusion.      Right Ear: Hearing and external ear normal.      Left Ear: Hearing and external ear normal.      Nose: Nose normal.      Mouth/Throat:      Lips: Pink.   Eyes:      General: Lids are normal. Vision grossly intact. Gaze aligned appropriately.      Extraocular Movements: Extraocular movements intact.      Conjunctiva/sclera: Conjunctivae normal.      Pupils: Pupils are equal, round, and reactive to light.   Cardiovascular:      Rate and Rhythm: Normal rate and regular rhythm.      Pulses: Normal pulses.           Carotid pulses are 2+ on the right side and 2+ on the left side.       Radial pulses are 2+ on the right side and 2+ on the left side.        Dorsalis pedis pulses are 2+ on the right side and 2+ on the left side.        Posterior tibial pulses are 2+ on the right side and 2+ on the left side.      Heart sounds: Normal heart sounds, S1 normal and S2 normal. No murmur heard.  Pulmonary:      Effort: Pulmonary effort is normal.      Breath sounds: Normal breath sounds and air entry.   Abdominal:      General: Abdomen is flat. Bowel sounds are normal. There is no distension or abdominal bruit.      Palpations: Abdomen is soft.      Tenderness: There is no abdominal tenderness.   Musculoskeletal:         General: Normal range of motion.      Right elbow: Tenderness present.      Cervical back: Full passive range of motion without pain, normal range of motion and neck supple.      Right lower leg: No edema.      Left lower leg: No edema.   Skin:     General: Skin is warm and dry.      Capillary Refill: Capillary refill takes less than 2 seconds.      Coloration: Skin is not cyanotic or pale.       Findings: No bruising, erythema or rash.   Neurological:      General: No focal deficit present.      Mental Status: She is alert and oriented to person, place, and time. Mental status is at baseline.      GCS: GCS eye subscore is 4. GCS verbal subscore is 5. GCS motor subscore is 6.      Cranial Nerves: Cranial nerves 2-12 are intact. No cranial nerve deficit.      Sensory: Sensation is intact. No sensory deficit.      Motor: Motor function is intact. No weakness.      Coordination: Coordination is intact. Coordination normal.      Gait: Gait is intact. Gait normal.      Deep Tendon Reflexes: Reflexes normal.   Psychiatric:         Attention and Perception: Attention and perception normal.         Mood and Affect: Mood and affect normal.         Speech: Speech normal.         Behavior: Behavior normal. Behavior is cooperative.         Thought Content: Thought content normal.         Cognition and Memory: Cognition and memory normal.         Judgment: Judgment normal.     -Pending x-ray right elbow at [facility].  -Treating with steroid Dosepak.  -Instructed to take over-the-counter Tylenol as needed.  -Instructed to apply ice to affected area 2-3 times a day for 20 minutes at a time.  -Discussed ER red flags.  -Instructed to follow-up with me in 2 weeks for right elbow pain.           Assessment and Plan   Diagnoses and all orders for this visit:    1. Right elbow pain (Primary)  -     XR Elbow 3+ View Right; Future  -     methylPREDNISolone (MEDROL) 4 MG dose pack; Take as directed on package instructions.  Dispense: 21 tablet; Refill: 0        Follow Up   Return in about 2 years (around 2/5/2026) for Recheck.    Patient Instructions   RICE Therapy for Routine Care of Injuries  Many injuries can be cared for with rest, ice, compression, and elevation (RICE therapy). This includes:  Resting the injured body part.  Putting ice on the injury.  Putting pressure (compression) on the injury.  Raising  the injured part (elevation).  Using RICE therapy can help to lessen pain and swelling.  Supplies needed:  Ice.  Plastic bag.  Towel.  Elastic bandage.  Pillow or pillows to raise your injured body part.  How to care for your injury with RICE therapy  Rest  Try to rest the injured part of your body. You can go back to your normal activities when your doctor says it is okay to do them and when you can do them without pain.  If you rest the injury too much, it may not heal as well. Some injuries heal better with early movement instead of resting for too long. Ask your doctor if you should do exercises to help your injury get better.  Ice    If told, put ice on the injured area. To do this:  Put ice in a plastic bag.  Place a towel between your skin and the bag.  Leave the ice on for 20 minutes, 2-3 times a day.  Take off the ice if your skin turns bright red. This is very important. If you cannot feel pain, heat, or cold, you have a greater risk of damage to the area.  Do not put ice on your bare skin. Use ice for as many days as your doctor tells you to use it.  Compression  Put pressure on the injured area. This can be done with an elastic bandage. If this type of bandage has been put on your injury:  Follow instructions on the package the bandage came in about how to use it.  Do not wrap the bandage too tightly.  Wrap the bandage more loosely if part of your body beyond the bandage is blue, swollen, cold, painful, or loses feeling.  Take off the bandage and put it on again every 3-4 hours or as told by your doctor.  See your doctor if the bandage seems to make your problems worse.    Elevation  Raise the injured area above the level of your heart while you are sitting or lying down.  Follow these instructions at home:  If your symptoms get worse or last a long time, make a follow-up appointment with your doctor. You may need to have imaging tests, such as X-rays or an MRI.  If you have imaging tests, ask how to get  your results when they are ready.  Return to your normal activities when your doctor says that it is safe.  Keep all follow-up visits.  Contact a doctor if:  You keep having pain and swelling.  Your symptoms get worse.  Get help right away if:  You have sudden, very bad pain at your injury or lower than your injury.  You have redness or more swelling around your injury.  You have tingling or numbness at your injury or lower than your injury, and it does not go away when you take off the bandage.  Summary  Many injuries can be cared for using rest, ice, compression, and elevation (RICE therapy).  You can go back to your normal activities when your doctor says it is okay and when you can do them without pain.  Put ice on the injured area as told by your doctor.  Get help if your symptoms get worse or if you keep having pain and swelling.  This information is not intended to replace advice given to you by your health care provider. Make sure you discuss any questions you have with your health care provider.  Document Revised: 10/07/2021 Document Reviewed: 10/07/2021  [employer] Patient Education © 2022 [employer]    Answers submitted by the patient for this visit:  Other (Submitted on 2/5/2024)  Please describe your symptoms.: Right elbow pain  Have you had these symptoms before?: Yes  How long have you been having these symptoms?: Greater than 2 weeks  Please list any medications you are currently taking for this condition.: None and cannot take nsaids  Please describe any probable cause for these symptoms. : No idea, presents like tennis elbow but i have not injured it or played sports  Primary Reason for Visit (Submitted on 2/5/2024)  What is the primary reason for your visit?: Other

## 2024-02-07 ENCOUNTER — E-VISIT (OUTPATIENT)
Dept: FAMILY MEDICINE CLINIC | Facility: TELEHEALTH | Age: 44
End: 2024-02-07
Payer: COMMERCIAL

## 2024-02-07 NOTE — EXTERNAL PATIENT INSTRUCTIONS
Diagnosis   Strep pharyngitis (strep throat)   My name is ROYCE Shanks, and I'm a healthcare provider at [facility]. I reviewed your interview, and I see that you have strep throat. This can be a painful condition, but it responds well to treatment.   To prevent the spread of illness to others, stay home and away from other people as much as possible while you're sick. When you need to be around other people, consider wearing a face mask.   Medications   Your pharmacy   [facility] - [address] [facility] [address] [phone]     Prescription   Azithromycin (500mg): Take 1 tablet by mouth once a day for 5 days for infection. This medication is an antibiotic. Take it exactly as directed. You must finish the entire course of medication, even if you feel better after the first few days of treatment.    Start taking the antibiotics I've prescribed right away. You need to finish the entire course of antibiotics, even if you start to feel better before the pills run out.    Some people develop a yeast infection after taking antibiotics. If you get a yeast infection, you can treat it with antifungal creams or suppositories. These are available without a prescription at [facility] and many supermarkets.   About your diagnosis   Strep throat is an infection in the throat and tonsils caused by group A streptococcus bacteria. Strep throat is most often spread by droplets that are released into the air after an infected person coughs or sneezes. You can also get strep throat by sharing cups or utensils with an infected person.   Symptoms usually begin within 2 to 5 days after a person has been exposed. The pain from strep throat usually starts quickly and can be severe, especially when swallowing. Body aches and pains are common.   What to expect   If you follow this treatment plan, you should start to feel better within a few days.   When to seek care   Call us at 1  [phone]   with any sudden or unexpected symptoms.    Symptoms that don't improve within 48 hours of starting antibiotics.    Symptoms that get better for a few days and then suddenly get worse.    Worsening fever.    Your throat pain becomes worse and makes swallowing extremely difficult or impossible.    Muffled voice (it may sound like you are talking with a mouthful of food).    Difficulty opening your mouth or jaw.    Stiff neck.    Joint pain, or swelling that moves from joint to joint.    Severe stomach pain.    Shortness of breath.    Nosebleed.    Severe fatigue.    A new rash.    Odd emotional or behavioral changes.    Uncontrollable body movements or \"jerkiness.\"    Severe chest pain.   Other treatment    Rest and drink plenty of water.    Choose throat-friendly liquids and foods, such as lemon tea, broth, applesauce, frozen yogurt, or popsicles.    Gargle with salt water several times a day to help with your throat pain.    Try cough drops and throat lozenges for extra relief.    Stir a teaspoon of honey into warm water or weak tea to help soothe a sore throat.    Avoid smoke and air pollution. Smoke can make infections worse.   Prevention    Avoid close contact with other people when you're sick.    Cover your mouth and nose when you cough or sneeze. Use a tissue or cough into your elbow. Make sure that used tissues go directly into the trash.    Avoid touching your eyes, nose, or mouth while you're sick.    Wash your hands often, especially after coughing, sneezing, or blowing your nose. If soap and water are not available, use an alcohol-based hand .    If you or someone in your home or workplace is sick, disinfect commonly used items. This includes door handles, tables, computers, remotes, and pens.    Coronavirus (COVID-19) information   Common symptoms of COVID-19 include fever, cough, shortness of breath, fatigue, muscle or body aches, headaches, new loss of sense of taste or smell, " sore throat, stuffy or runny nose, nausea or vomiting, and diarrhea. Most people who get COVID-19 have mild symptoms and can rest at home until they get better. Elderly people and those with chronic medical problems may be at risk for more serious complications.   FAQs about the COVID-19 vaccine   Are the vaccines safe?   Yes. Hundreds of millions of people in the [place] have already safely received COVID-19 vaccines under the most intense safety monitoring in the history of the [place].   Do I need the vaccine if I've already had COVID?   Yes. Vaccination helps protect you even if you've already had COVID.   If you had COVID-19 and had symptoms, wait to get vaccinated until you've recovered and completed your isolation period.   If you tested positive for COVID-19 but did not have symptoms, you can get vaccinated after 5 full days have passed since you had a positive test, as long as you don't develop symptoms.   How many doses of the vaccine do I need?   Visit www.cdc.gov/coronavirus/2019-ncov/vaccines/stay-up-to-date.html   to find out how to stay up to date with your COVID-19 vaccines.   I'm immunocompromised. How many doses of the vaccine do I need?   For information on how immunocompromised people can stay up to date with their COVID-19 vaccines, visit www.cdc.gov/coronavirus/2019-ncov/vaccines/recommendations/immuno.html  .   What are the common side effects of the vaccine?   A sore arm, tiredness, headache, and muscle pain may occur within two days of getting the vaccine and last a day or two. For the Moderna or Pfizer vaccines, side effects are more common after the second dose. People over the age of 55 are less likely to have side effects than younger people.   After I'm up to date on vaccines, can I still get or spread COVID?   Yes, you can still get COVID, but your disease should be milder. And your risk of serious illness, hospitalization, and complications will be much lower, especially if you're up to date.  Unfortunately, you can still spread COVID if you've been vaccinated. That's why it's important to follow isolation guidelines if you get sick or test positive.   After I'm up to date on vaccines, can I go back to normal?   You should still wear a mask indoors in public if:    It's required by laws, rules, regulations, or local guidance.    You have a weakened immune system.    Your age puts you at increased risk of severe disease.    You have a medical condition that puts you at increased risk of severe disease.    Someone in your household has a weakened immune system, is at increased risk for severe disease, or is unvaccinated.    You're in an area of high transmission.   Where can I get a COVID-19 vaccine?   Visit [facility]'s website for more information. To find a COVID-19 vaccination site near you, visit www.[employer].gov/  , call 1-727.735.5234  , or text your zip code to 064412 ([employer]). Message and data rates may apply.   I've had close contact with someone who has COVID. Do I need to quarantine, and if so, for how long?   For the most current answer, including a calculator to determine whether you need to stay home and for how long, visit www.cdc.gov/coronavirus/2019-ncov/your-health/isolation.html  .   I've tested positive for COVID. How long do I need to isolate?   For the latest recommendations, including a calculator to determine how long you need to stay home, visit www.cdc.gov/coronavirus/2019-ncov/your-health/isolation.html  .   What if I develop symptoms that might be from COVID?   For the latest recommendations on what to do if you're sick, including when to seek emergency care, visit www.cdc.gov/coronavirus/2019-ncov/if-you-are-sick/index.html  .    Flu vaccine information   Who should get a flu vaccine?   Everyone 6 months of age and older should get a yearly flu vaccine.   When should I get vaccinated?   You should get a flu vaccine by the end of October. Once you're vaccinated, it takes  about two weeks for antibodies to develop and protect you against the flu. That's why it's important to get vaccinated as soon as possible.   After October, is it too late to get vaccinated?   No. You should still get vaccinated. As long as the flu viruses are still in your community, flu vaccines will remain available, even into January of next year or later.   Why do I need a flu vaccine EVERY year?   Flu viruses are constantly changing, so flu vaccines are usually updated from one season to the next. Your protection from the flu vaccine also lessens over time.   Is the flu vaccine safe?   Yes. Over the last 50 years, hundreds of millions of Americans have safely received the flu vaccines.   What are the side effects of flu vaccines?   You CANNOT get the flu from a flu vaccine. Common side effects of the flu shot include soreness, redness and/or swelling where the shot was given, low grade fever, and aches. Common side effects of the nasal spray flu vaccine for adults include runny nose, headaches, sore throat, and cough. For children, side effects include wheezing, vomiting, muscle aches, and fever.   Does the flu vaccine increase your risk of getting COVID-19?   No. There is no evidence that getting a flu vaccine increases your risk of getting COVID-19.   Is it safe to get the flu vaccine along with a COVID-19 vaccine?   Yes. It's safe to get the flu vaccine with a COVID-19 vaccine or booster.   Contact your healthcare provider TODAY for details on when and where to get your flu vaccine.   Your provider   Your diagnosis was provided by ROYCE Shanks, a member of your trusted care team at [facility].   If you have any questions, call us at [phone]  .

## 2024-02-07 NOTE — E-VISIT TREATED
Chief Complaint: Cold, flu, COVID, sinus, hay fever, or seasonal allergies   Patient introduction   Patient is 43-year-old female with sore throat that started less than 48 hours ago.   COVID-19 testing history, vaccination status, and exposure:    Patient was tested for COVID-19 within the last 24 hours. Test result was negative.    Has not received an updated 1726-2951 COVID-19 vaccination (Pfizer-BioNTech or Moderna vaccine after September 12, 2023; or Novavax vaccine after October 3, 2023).    No known exposure to a person with a confirmed or suspected case of COVID-19.    No high-risk (household) exposure to COVID-19 within the last 14 days.   Risk factors for severe disease from COVID-19 infection    Higher risk for severe disease from COVID-19 because of BMI >= 25.    Mostly sedentary lifestyle.    Hypertension.   Warning. The following may warrant further investigation:    Hypertension.   General presentation   Symptoms came on suddenly.   Fever:    No fever.   Sinus and nasal symptoms:    Sinus pain or pressure on or around the cheeks.    Patient first noticed sinus pain less than 5 days ago.    Sinus pain is not worse with Valsalva.    No nasal or sinus congestion.    No nasal discharge.    No itchy nose or sneezing.    No history of unhealed nasal septal ulcer/nasal wound.    No history of antibiotic treatment for sinus infection in the last year.    No history of deviated septum or nasal polyps.   Throat symptoms:    Sore throat.    No known recent strep exposure.    Patient thinks they have strep.    Has discomfort when swallowing but can swallow liquids and solid foods.   Head and body aches:    No headache.    No sweats.    No chills.    No myalgia.    No fatigue.   Cough:    No cough.   Wheezing and shortness of breath:    No COPD diagnosis.    No asthma diagnosis.    No wheezing.    No shortness of breath.   Chest pain:    No chest pain.   Ear symptoms:    None.   Dizziness:    No dizziness.    "Allergies:    No history of allergies.   Flu exposure:    No recent known exposure to a person with a confirmed flu diagnosis.    Received a flu vaccine in the last 3 to 6 months.   Not taking any over-the-counter medications for current symptoms.   Review of red flags/alarm symptoms:    No changes in alertness or awareness.    No symptoms suggesting airway obstruction.    No decreased urination.    No blue or gray coloring present in face, lips, or nail beds.    No swelling, pain, redness, or increased warmth in the calf or lower part of ONE leg only.    No proptosis.   Pregnancy/menstrual status/breastfeeding:    Not pregnant.    Not breastfeeding.    Regarding date of last menstrual period, patient writes: Last week .   Self-exam:    Height: 5' 5\"    Weight: 206 lbs    No tonsillar edema.    Purulent tonsils.    No palatal petechiae.    Neck lymph nodes feel normal.   Recent antibiotic use:    Has not taken antibiotics for similar symptoms within the past month.   Current medications   Currently taking cyanocobalamin 1000 MCG/ML injection, Vitamin D3 50 MCG (2000 UT) capsule, multivitamin with minerals tablet tablet, omeprazole 20 MG capsule, methylPREDNISolone 4 MG dose pack, Mirena (52 MG) 20 MCG/DAY intrauterine device IUD, and lisinopril 5 MG tablet.   Medication allergies   None.   Medication contraindication review   Patient has history of hypertension. Therefore, the following medication(s) will not be prescribed:    Metoclopramide.    Acetaminophen-diphenhydramine-phenylephrine.    Pseudoephedrine.    Aspirin-chlorpheniramine-phenylephrine.   No history of metoclopramide-associated dystonic reaction and tardive dyskinesia.   No known history of amoxicillin-clavulanate-associated cholestatic jaundice or hepatic impairment.   No known history of azithromycin-associated cholestatic jaundice or hepatic impairment.   Past medical history   Immune conditions:   No immunocompromising conditions.   No history of " cancer.   Social history   Never smoked tobacco.   Patient did not request an excuse note.   Assessment   Strep pharyngitis.   This is the likely diagnosis based on patient's interview responses, including:    Symptom profile   Plan   Medications:    azithromycin 500 mg tablet RX 500mg 1 tab PO qd 5d for infection. This medication is an antibiotic. Take it exactly as directed. You must finish the entire course of medication, even if you feel better after the first few days of treatment. Amount is 5 tab.   The patient's prescription will be sent to:   [facility] [address] [address]   Phone: [phone]     Fax: [phone]   Education:    Condition and causes    Prevention    Treatment and self-care    When to call provider   ----------   Electronically signed by ROYCE Shanks on 2024-02-07 at 07:22AM   ----------   Patient Interview Transcript:   Which of these symptoms are bothering you? Select all that apply.    Sore throat   Not selected:    Cough    Shortness of breath    Stuffed-up nose or sinuses    Runny nose    Itchy or watery eyes    Itchy nose or sneezing    Loss of smell or taste    Hoarse voice or loss of voice    Headache    Fever    Sweats    Chills    Muscle or body aches    Fatigue or tiredness    Nausea or vomiting    Diarrhea    I don't have any of these symptoms   When did your current symptoms start? Select one.    Less than 48 hours ago   Not selected:    3 to 5 days ago    6 to 9 days ago    10 to 14 days ago    2 to 3 weeks ago    3 to 4 weeks ago    More than a month ago   Did your symptoms come on suddenly or gradually? Select one.    Suddenly   Not selected:    Gradually    I'm not sure   Since your current symptoms started, have you been tested for COVID-19? This includes home self-tests as well as nose swab or saliva tests done at a doctor's office, lab, or testing site. Select one.    Yes   Not selected:    No   When  "was your most recent COVID-19 test? Select one.    Within the last 24 hours   Not selected:    Within the last week (specify date as MM/DD/YY)    7 to 14 days ago    15 to 30 days ago    More than 1 month ago   What was the result of your most recent COVID-19 test? Select one.    Negative   Not selected:    Positive   Has anyone in your household tested positive for COVID-19 in the past 14 days? Select one.    No   Not selected:    Yes   In the last 14 days, have you had close contact with someone who has COVID-19? \"Close contact\" means any of these: - Caring for someone with COVID-19. - Being within 6 feet of someone with COVID-19 for a total of at least 15 minutes over a 24-hour period. For example, three 5-minute exposures for a total of 15 minutes. - Being in direct contact with respiratory droplets from someone with COVID-19 (being coughed on, kissing, sharing utensils). Select one.    No, not that I know of   Not selected:    Yes, a confirmed case    Yes, a suspected case   Have you gotten the [id number] updated COVID-19 vaccine? This means either the updated Pfizer-BioNTech or Moderna vaccine after September 12, 2023; or the updated Novavax vaccine after October 3, 2023. Select one.    No   Not selected:    Yes   Since your symptoms started, have you felt dizzy? Select one.    No   Not selected:    Yes, but I can still do my regular daily activities    Yes, and it makes it hard to stand, walk, or do daily activities   Do you feel sinus pain or pressure in any of these areas?    In my cheeks   Not selected:    In my forehead    Around my eyes    Behind my nose    In my upper teeth or jaw    No   When did you first notice your sinus pain or pressure? Select one.    Less than 5 days ago   Not selected:    5 to 9 days ago    10 to 14 days ago    2 to 4 weeks ago    1 month ago or longer   Does coughing, sneezing, or leaning forward make your sinuses feel worse? Select one.    No   Not selected:    Yes   Can you " swallow liquids and solid foods? A sore throat may be painful when swallowing, but it shouldn't prevent you from swallowing. Select one.    Yes, but it's uncomfortable   Not selected:    Yes, with ease    Yes, but it's painful    It's hard to swallow anything because it feels like liquids and food get stuck in my throat    No, I can't swallow anything, liquid or solid foods   Is your throat pain worse on one side than the other? Select one.    No, it's the same on both sides   Not selected:    Yes, it's worse on the right side    Yes, it's worse on the left side   Do you have chest pain? You might also feel it as discomfort, aching, tightness, or squeezing in the chest. Select one.    No   Not selected:    Yes   Have you urinated at least 3 times in the last 24 hours? Select one.    Yes   Not selected:    No   Do your face, lips, or nail beds appear blue or gray? Select one.    No   Not selected:    Yes   Do you have any swelling, pain, redness, or increased warmth in the calf or lower part of ONE leg only? Select one.    No   Not selected:    Yes   Changes in alertness or awareness may mean you need emergency care. Since your symptoms started, have you had any of these? Select all that apply.    None of the above   Not selected:    Confusion    Slurred speech    Not knowing where you are or what day it is    Difficulty staying conscious    Fainting or passing out   Do your symptoms include a whistling sound, or wheezing, when you breathe? Select one.    No   Not selected:    Yes   Since your symptoms started, have you noticed that one or both of your eyes is bulging or poking out? Select one.    No   Not selected:    Yes   Do you have any of these symptoms in your ear(s)? Select all that apply.    None of the above   Not selected:    Pain    Pressure    Fullness    Crackling or popping    Plugged or blocked sensation   Are your tonsils larger than usual?    No, not that I can tell   Not selected:    Yes    I've had  my tonsils removed   Is there any white or yellow pus on your tonsils?    Yes   Not selected:    No, not that I can see   Are there red spots on the roof of your mouth or the back of your throat?    No, not that I can see   Not selected:    Yes   Are your glands/lymph nodes swollen, or does it hurt when you touch them?    No, not that I can tell   Not selected:    Yes   In the past 2 weeks, has anyone around you (such as at school, work, or home) had a confirmed diagnosis of strep throat? A confirmed diagnosis means that a throat swab and lab test were done to verify a strep throat infection. Select one.    No, not that I know of   Not selected:    Yes   Do you think you might have strep throat? Select one.    Yes   Not selected:    No   In the past week, has anyone around you (such as at school, work, or home) had a confirmed diagnosis of the flu? A confirmed diagnosis means that a nose swab was done to verify a flu infection. Select all that apply.    No, not that I know of   Not selected:    I live with someone who has the flu    I've been within touching distance of someone who has the flu    I've walked by, or sat about 3 feet away from, someone who has the flu    I've been in the same building as someone who has the flu   Have you ever been diagnosed with asthma? Select one.    No   Not selected:    Yes   Have you ever been diagnosed with chronic obstructive pulmonary disease (COPD)? Select one.    No, not that I know of   Not selected:    Yes   In the past month, have you taken antibiotics for similar symptoms? Examples of antibiotics include amoxicillin, amoxicillin-clavulanate (Augmentin), penicillin, cefdinir (Omnicef), doxycycline, and clindamycin (Cleocin). Select one.    No   Not selected:    Yes (specify)   In the last year, how many times were you treated with antibiotics for a sinus infection? Select one.    None   Not selected:    1 to 3 times    4 or more times   Do any of these apply to you?  Select all that apply.    None of the above   Not selected:    I've been hospitalized within the last 5 days    I have diabetes    I'm in close contact with a child in    Have you been diagnosed with a deviated septum or nasal polyps? The nose is divided into two nostrils by the septum. A crooked septum is called a deviated septum. Nasal polyps are growths inside the nose or sinuses. Select one.    No, not that I know of   Not selected:    Yes, but I had surgery to treat them    Yes, I have a deviated septum    Yes, I have nasal polyps    Yes, I have a deviated septum and nasal polyps   Do you have a sore inside your nose that won't heal? Select one.    No, not that I know of   Not selected:    Yes   Do you have allergies (pollen, dust mites, mold, animal dander)? Select one.    No, not that I know of   Not selected:    Yes   Have you had a flu shot this season? Select one.    Yes, 3 to 6 months ago   Not selected:    Yes, less than 2 weeks ago    Yes, 2 to 4 weeks ago    Yes, 1 to 3 months ago    Yes, more than 6 months ago    No   Have you gone through menopause? Select one.    No   Not selected:    Yes    I'm going through it now   Are you pregnant? Select one.    No   Not selected:    Yes   When was your last menstrual period? If you don't currently have periods or no longer have periods, please briefly explain.    __Last week __   Within the last 2 weeks, have you: - Given birth - Had a miscarriage - Had a pregnancy loss - Had an  Being postpartum (live birth or loss) within the last 2 weeks increases your risk of flu complications. Select one.    No   Not selected:    Yes   Are you breastfeeding? Select one.    No   Not selected:    Yes   The flu and COVID-19 can be more serious for people in certain groups. The next few questions help us figure out if you or anyone you live with is at higher risk for complications from these infections. Do any of these statements apply to you? Select all that  apply.    None of the above   Not selected:    I'm     I'm     I'm Black    I'm  or    Do you smoke tobacco? Select one.    No   Not selected:    Yes, every day    Yes, some days    No, I quit   Do you have a mostly inactive lifestyle? Answer yes if all of these are true: - You spend at least 6 hours a day sitting or lying down - You get less than 2 and a half hours per week of moderate exercise such as walking fast - You get less than 1 hour and 15 minutes per week of intense exercise such as jogging or running Select one.    Yes   Not selected:    No   Do you have any of these conditions? Select all that apply.    None of the above   Not selected:    Chronic lung disease, such as cystic fibrosis or interstitial fibrosis    Heart disease, such as congenital heart disease, congestive heart failure, or coronary artery disease    Disorder of the brain, spinal cord, or nerves and muscles, such as dementia, cerebral palsy, epilepsy, muscular dystrophy, or developmental delay    Metabolic disorder or mitochondrial disease    Cerebrovascular disease, such as stroke or another condition affecting the blood vessels or blood supply to the brain    Down syndrome    Mood disorder, including depression or schizophrenia spectrum disorders    Substance use disorder, such as alcohol, opioid, or cocaine use disorder    Tuberculosis    Primary immunodeficiency   Do you live in a group care setting? Examples include: - Nursing home - Residential care - Psychiatric treatment facility - Group home - Dormitory - Board and care home - Homeless shelter - Foster care setting Select one.    No   Not selected:    Yes   Are you a healthcare worker? Select one.    Yes   Not selected:    No   People with a very high body mass index (BMI) are at higher risk for developing complications from the flu and severe illness from COVID-19. To determine your BMI, we need to know your weight and height. Please  enter your weight (in pounds).    Weight   Please enter your height.    Height   Do you have any of these conditions that can affect the immune system? Scroll to see all options. Select all that apply.    None of these   Not selected:    History of bone marrow transplant    Chronic kidney disease    Chronic liver disease (including cirrhosis)    HIV/AIDS    Inflammatory bowel disease (Crohn's disease or ulcerative colitis)    Lupus    Moderate to severe plaque psoriasis    Multiple sclerosis    Rheumatoid arthritis    Sickle cell anemia    Alpha or beta thalassemia    History of kidney, liver, heart, or other solid organ transplant    History of liver, heart, or other solid organ transplant    History of spleen removal    An autoimmune disorder not listed here (specify)    A condition requiring treatment with long-term use of oral steroids (such as prednisone, prednisolone, or dexamethasone) (specify)   Have you ever been diagnosed with cancer? Select one.    No   Not selected:    Yes, I have cancer now    Yes, but I'm in remission   The flu and COVID-19 can be more serious for people in certain groups. Do any of these apply to the people who live with you? Select all that apply.    None of the above   Not selected:    Under age 5    Over age 65            Black     or     Pregnant    Has given birth, had a miscarriage, had a pregnancy loss, or had an  in the last 2 weeks   Does any member of your household have any of these medical conditions? Select all that apply.    None of the above   Not selected:    Asthma    Disorders of the brain, spinal cord, or nerves and muscles, such as dementia, cerebral palsy, epilepsy, muscular dystrophy, or developmental delay    Chronic lung disease, such as COPD or cystic fibrosis    Heart disease, such as congenital heart disease, congestive heart failure, or coronary artery disease    Cerebrovascular disease, such as stroke or  another condition affecting the blood vessels or blood supply to the brain    Blood disorders, such as sickle cell disease    Diabetes    Metabolic disorders such as inherited metabolic disorders or mitochondrial disease    Kidney disorders    Liver disorders    Weakened immune system due to illness or medications such as chemotherapy or steroids    Children under the age of 19 who are on long-term aspirin therapy    Extreme obesity (BMI > 40)   Do you have any of these conditions? Scroll to see all options. Select all that apply.    High blood pressure   Not selected:    Aspirin triad (also known as Samter's triad or ASA triad)    Asthma or hives from taking aspirin or other NSAIDs, such as ibuprofen or naproxen    Blockage or narrowing of the blood vessels of the heart    Blood clotting disorder    Blood dyscrasia, such anemia, leukemia, lymphoma, or myeloma    Bone marrow depression    Catecholamine-releasing paraganglioma    Congenital long QT syndrome    Depression    Difficulty urinating or completely emptying your bladder    Uncorrected electrolyte abnormalities    Fungal infection    Gastrointestinal (GI) bleeding    Gastrointestinal (GI) obstruction    G6PD deficiency    Recent heart attack    Irregular heartbeat or heart rhythm    Mononucleosis (mono)    Myasthenia gravis    Parkinson's disease    Pheochromocytoma    Reye syndrome    Seizure disorder    Thyroid disease    Ulcerative colitis    None of the above   Have you ever had either of these conditions? Select all that apply.    No   Not selected:    Metoclopramide-associated dystonic reaction    Tardive dyskinesia   Just a few more questions about medications, and then you're finished. Have you used any non-prescription medications or nasal sprays for your current symptoms? Examples include saline sprays, decongestants, NyQuil, and Tylenol. Select one.    No   Not selected:    Yes   Have you taken any monoamine oxidase inhibitor (MAOI) medications in  the last 14 days? Examples include rasagiline (Azilect), selegiline (Eldepryl, Zelapar), isocarboxazid (Marplan), phenelzine (Nardil), and tranylcypromine (Parnate). Select one.    No, not that I know of   Not selected:    Yes   Do you take Kynmobi or Apokyn (apomorphine)? Select one.    No   Not selected:    Yes   Are you still taking these medications listed in your medical record? If you're not taking any of these, click Next. Select all that apply.    cyanocobalamin 1000 MCG/ML injection    Vitamin D3 50 MCG (2000 UT) capsule    multivitamin with minerals tablet tablet    omeprazole 20 MG capsule    methylPREDNISolone 4 MG dose pack    Mirena (52 MG) 20 MCG/DAY intrauterine device IUD    lisinopril 5 MG tablet   Are you taking any other medications, vitamins, or supplements? Select one.    No   Not selected:    Yes   Have you ever had an allergic or bad reaction to any medication? Select one.    No   Not selected:    Yes   Are you allergic to milk or to the proteins found in milk (for example, whey or casein)? A milk allergy is different from lactose intolerance. Select one.    No, not that I know of   Not selected:    Yes   Have you ever had jaundice or liver problems as a result of taking amoxicillin-clavulanate (Augmentin)? Jaundice is a condition in which the skin and the whites of the eyes turn yellow. Select all that apply.    No, not that I know of   Not selected:    Yes, jaundice    Yes, liver problems   Have you ever had jaundice or liver problems as a result of taking azithromycin (Zithromax, Zmax)? Jaundice is a condition in which the skin and the whites of the eyes turn yellow. Select all that apply.    No, not that I know of   Not selected:    Yes, jaundice    Yes, liver problems   Do you need a doctor's note? A doctor's note confirms that you received care today and states when you can return to school or work. It does not contain information about your diagnosis or treatment plan. Your provider  will make the final decision on whether to give you a doctor's note and for how long. Doctor's notes CANNOT be backdated. We can't provide medical leave paperwork through this type of visit. If more paperwork is needed to request time off, contact your primary care provider. Select one.    No   Not selected:    Today only (1 day)    Today and tomorrow (2 days)    3 days    5 days    7 days   Is there anything you'd like to add about your symptoms? Please limit your comments to the symptoms covered in this interview. If you include comments about other concerns, your provider may recommend that you be seen in person.   The patient did not enter any additional information.   ----------   Medical history   Medical history data does not currently exist for this patient.

## 2024-02-20 RX ORDER — OMEPRAZOLE 20 MG/1
20 CAPSULE, DELAYED RELEASE ORAL DAILY
Qty: 30 CAPSULE | Refills: 0 | Status: SHIPPED | OUTPATIENT
Start: 2024-02-20

## 2024-03-30 DIAGNOSIS — Z76.0 MEDICATION REFILL: ICD-10-CM

## 2024-03-30 DIAGNOSIS — I10 PRIMARY HYPERTENSION: ICD-10-CM

## 2024-04-01 RX ORDER — OMEPRAZOLE 20 MG/1
20 CAPSULE, DELAYED RELEASE ORAL DAILY
Qty: 90 CAPSULE | Refills: 0 | Status: SHIPPED | OUTPATIENT
Start: 2024-04-01

## 2024-04-01 RX ORDER — LISINOPRIL 5 MG/1
5 TABLET ORAL DAILY
Qty: 90 TABLET | Refills: 1 | Status: SHIPPED | OUTPATIENT
Start: 2024-04-01

## 2024-04-17 DIAGNOSIS — K21.9 GASTROESOPHAGEAL REFLUX DISEASE, UNSPECIFIED WHETHER ESOPHAGITIS PRESENT: Primary | ICD-10-CM

## 2024-04-18 RX ORDER — OMEPRAZOLE 20 MG/1
20 CAPSULE, DELAYED RELEASE ORAL DAILY
Qty: 90 CAPSULE | Refills: 0 | Status: SHIPPED | OUTPATIENT
Start: 2024-04-18

## 2024-04-18 NOTE — TELEPHONE ENCOUNTER
Receipt not confirmed by pharmacy. Please resend to pharmacy.     Rx Refill Note  Requested Prescriptions     Pending Prescriptions Disp Refills    omeprazole (priLOSEC) 20 MG capsule 90 capsule 0     Sig: Take 1 capsule by mouth Daily.      Last office visit with prescribing clinician: 2/5/2024   Last telemedicine visit with prescribing clinician: Visit date not found   Next office visit with prescribing clinician: Visit date not found                         Would you like a call back once the refill request has been completed: [] Yes [] No    If the office needs to give you a call back, can they leave a voicemail: [] Yes [] No    Karla Sung CMA  04/18/24, 09:56 EDT

## 2024-05-24 ENCOUNTER — E-VISIT (OUTPATIENT)
Dept: FAMILY MEDICINE CLINIC | Facility: TELEHEALTH | Age: 44
End: 2024-05-24
Payer: COMMERCIAL

## 2024-05-24 NOTE — EXTERNAL PATIENT INSTRUCTIONS
Diagnosis   Urinary tract infection (UTI)   My name is TrinityROYCE Field. I'm a healthcare provider at [facility]. After reviewing your interview, I see you have a urinary tract infection (UTI).   Medications   Your pharmacy   [facility] [id number] [address] [place] [address] [place] [address] [phone]     Prescription   Nitrofurantoin monohydrate/macrocrystalline (100mg): Take 1 capsule by mouth every 12 hours for 5 days for infection. This medication is an antibiotic. Take it exactly as directed. You must finish the entire course of medication, even if you feel better after taking the first few doses.   Phenazopyridine (200mg): Take 1 tablet by mouth 3 times a day as needed for 2 days for pain or discomfort associated with your condition.   Fluconazole (150mg): Take 1 tablet by mouth once for 1 day as a single dose. Use this medication only if you develop a yeast infection. If yeast infection symptoms are still present 3 days after taking the first tablet, take the second tablet.    I've given you a prescription dose of phenazopyridine. If it's more affordable or convenient, you may use the equivalent amount of non-prescription phenazopyridine. For example, instead of taking one 200 mg phenazopyridine tablet, you may take two 95 mg phenazopyridine tablets.    Because you've developed yeast infections after taking antibiotics in the past, I've prescribed Diflucan (fluconazole). Diflucan is an oral antifungal that treats yeast infections. Use this medication only if you develop a yeast infection.   About your diagnosis   A UTI is an infection of one or more parts of the urinary tract, most commonly the bladder.   Most UTIs are caused by bacteria (usually E. coli) that travel up the urethra and into the bladder. I see that you have some common signs and symptoms of a UTI:    Pain or burning while urinating    Frequent urination    Symptoms that feel a lot like past UTIs    Cloudy urine    Strange or  strong smelling urine    Symptoms that began shortly after sexual intercourse   Fortunately, most UTIs aren't serious, and they're easily treated with antibiotics. Make sure you take all of the antibiotic pills given to you, even if you start to feel better after the first few doses. Otherwise, the UTI might come back.   What to expect   If you follow this treatment plan, you should start to feel better within 1 to 2 days.   When to seek care   Call us at [phone]   with any sudden or unexpected symptoms.    Symptoms that don't improve or get worse in the next 48 hours    Fever that goes above 101F or lasts longer than 24 hours    Shaking or chills    Nausea or vomiting    Severe flank pain (pain in your back or side)   Other treatment    Rest and drink plenty of water    Urinate frequently and when you first feel the urge    Place a heating pad on your back or stomach to help relieve some of the discomfort   Prevention    Drink a lot of liquids to help flush bacteria from your system. Water is best. Try for six to eight, 8-ounce glasses a day on a regular basis.    Urinate often and when you first feel the urge. Bacteria can grow when urine stays in the bladder too long. Urinate after sex to flush away bacteria.    After using the toilet, always wipe from front to back. This step is most important after a bowel movement. Wiping from front to back prevents bacteria normally found in stool from entering the urinary tract.   Your provider   Your diagnosis was provided by ROYCE Lopez, a member of your trusted care team at [facility].   If you have any questions, call us at [phone]  .

## 2024-05-24 NOTE — E-VISIT TREATED
Chief Complaint: Bladder infection (UTI)   Patient introduction   Patient is 44-year-old female. Patient provided the following organ inventory: Presence of vagina.   Patient has had dysuria and frequent urination for less than 24 hours.   Urine is yellow with no unusual odor.   General presentation   No fever. No nausea or vomiting.   No stomach/pelvic pain.   No back pain.   No flank pain.   Has not taken antibiotics for current symptoms.   The following treatments were helpful for current symptoms:    Phenazopyridine   The following treatments were not helpful for current symptoms:    Acetaminophen    Ibuprofen   Previous history of UTI. Current symptoms feel exactly the same as previous UTIs. Received treatment for UTI 1 to 3 times in last year. Patient's last use of antibiotics for UTI was more than 3 months ago.   Previously developed yeast infections as a result of taking antibiotics for past UTIs.   No history of pyelonephritis. No history of kidney stones.   Had sexual intercourse in the past week. Does not use diaphragm. No unprotected sexual intercourse with a new partner in the last 2 weeks.   Patient is not being treated for diabetes mellitus.   Review of red flags/alarm symptoms:    No recent hospitalizations or nursing home care (last 3 months)    No history of renal failure    No recent history of urologic instrumentation    No anatomic abnormalities of the urinary tract    No abnormal vaginal discharge    No visible vaginal sores    No pain with sexual intercourse    No abnormal vaginal bleeding or spotting   Pregnancy/menstrual status/breastfeeding:   Patient is not pregnant. Patient is not breastfeeding. Regarding date of last menstrual period, patient writes: 30 days ago .   Current medications   Currently taking lisinopril 5 MG tablet, Mirena (52 MG) 20 MCG/DAY intrauterine device IUD, and omeprazole 20 MG capsule.   Medication allergies   None.   Medication contraindication review   Patient  is currently taking an ACE inhibitor. Therefore, medications containing trimethoprim will not be prescribed.   Patient is being treated for high blood pressure. Therefore, the following medication(s) will not be prescribed:    Ciprofloxacin   No known history of amoxicillin-clavulanate-associated cholestatic jaundice or nitrofurantoin-associated cholestatic jaundice.   Past medical history   Immune conditions: No immunocompromising conditions.   No history of cancer.   Patient-submitted comments   Patient was asked if they had anything to add about their symptoms. Patient writes: Please respond this time, no response to earlier interview .   Patient did not request an excuse note.   Assessment   Uncomplicated acute UTI.   This is the likely diagnosis based on patient's interview responses, including:    Symptom profile    Previous history of UTI    Current symptoms are exactly the same as previous UTIs   No recent history of kidney stones.   Plan   Medications:    nitrofurantoin monohydrate/macrocrystals 100 mg capsule RX 100mg 1 cap PO q12h 5d for infection. This medication is an antibiotic. Take it exactly as directed. You must finish the entire course of medication, even if you feel better after taking the first few doses. Amount is 10 cap.    fluconazole 150 mg tablet RX 150mg 1 tab PO once 1d as a single dose for vaginal yeast infection. Repeat in 72 hours if symptoms persist. Amount is 2 tab.    phenazopyridine 200 mg tablet RX 200mg 1 tab PO tid PRN 2d for pain or discomfort associated with your condition. Amount is 6 tab.   The patient's prescriptions will be sent to:   [facility] [id number]   [address] [place] [address] [place] [address]   Phone: [phone]     Fax: [phone]   Education:    Condition and causes    Prevention    Treatment and self-care    When to call provider   Follow-up:   Patient to follow up as needed for progression or lack of improvement in symptoms within 3d.   ----------    Electronically signed by ROYCE Lopez on 2024-05-24 at 11:15AM   ----------   Patient Interview Transcript:   Knowing about your anatomy is important for diagnosing and treating UTIs. The gender we have on file for you is female, but we realize this might not tell the whole story. Which of these do you have? Select one.    Vagina   Not selected:    Penis   Which of these symptoms do you have? Select all that apply.    Pain or burning while urinating    Frequent urination   Not selected:    _Sudden urge to urinate and it's hard to hold the urine in _   How long have you had these symptoms? Select one.    Less than 24 hours   Not selected:    1 to 3 days    4 to 6 days    7 to 10 days    More than 10 days   Have you already started taking antibiotics for your current symptoms? Select one.    No   Not selected:    Yes   Since your current symptoms started, has it been difficult to start, stop, or delay urination? Select one.    No   Not selected:    Yes   What color is your urine? Select one.    Yellow   Not selected:    Clear    Cloudy    Pink or red   Does your urine smell strange (like ammonia) or stronger than usual? Select one.    No   Not selected:    Yes   Do you also have any of these symptoms? Select all that apply.    No   Not selected:    Fever    Nausea    Vomiting    Pain, pressure, or discomfort in the lower abdomen    Back pain   Do you have any flank pain? The flank is the side of the body between the ribs and the hips.    No   Not selected:    Yes, in my left flank    Yes, in my right flank    Yes, in both my left and right flanks   Do you have any of these vaginal symptoms? Select all that apply.    No   Not selected:    Abnormal vaginal itching    Unscheduled or abnormal vaginal bleeding or spotting    Pain during sex    Visible sores on the vagina    Abnormal vaginal discharge   In the past 2 weeks, have you had a medical device or instrument placed in your urinary tract? Examples include  catheters, stents, and nephrostomy tubes. Select one.    No   Not selected:    Yes   Have you recently been hospitalized or been a resident of a nursing home or other long-term care facility? This doesn't include emergency room (ER) visits. Select one.    No   Not selected:    Yes, within the last 2 weeks    Yes, within the last 3 months   Kidney failure    No   Not selected:    Within the last year    More than a year ago   Kidney infection (pyelonephritis)    No   Not selected:    Within the last year    More than a year ago   Kidney stones    No   Not selected:    Within the last year    More than a year ago   Kidney transplant    No   Not selected:    Within the last year    More than a year ago   Have you ever been diagnosed with any of these? Select all that apply.    No   Not selected:    Urinary reflux    Bladder diverticula    Single (or horseshoe) kidney    Duplicated urethra   Have you recently held your urine for a long time after you felt the urge to go? Select one.    No   Not selected:    Yes   Have you recently avoided eating or drinking so you wouldn't have the urge to urinate as often? Select one.    No   Not selected:    Yes   Do you use a diaphragm? Select one.    No   Not selected:    Yes   Have you gone through menopause? Select one.    No   Not selected:    Yes    I'm going through it now   Are you pregnant? Select one.    No   Not selected:    Yes   When was your last menstrual period? If you don't currently have periods or no longer have periods, please briefly explain.    __30 days ago __   Are you breastfeeding? Select one.    No   Not selected:    Yes   Have you had sexual intercourse in the past week? Recent sexual intercourse is a risk factor for urinary tract infections. Select one.    Yes   Not selected:    No   Have you had unprotected sexual intercourse with a new partner in the last 2 weeks? Select one.    No   Not selected:    Yes   Have you traveled to any of these countries  within the last 3 months? Recent travel to these countries may affect which medication we recommend for your symptoms. Select all that apply.    None of these   Not selected:    [place]    [place]    [place]    [place]   Acetaminophen (Tylenol)    Not helpful   Not selected:    Helpful   Ibuprofen (Advil, Motrin)    Not helpful   Not selected:    Helpful   Phenazopyridine (Azo, Baridium, Pyridium, Uricalm, Uristat)    Helpful   Not selected:    Not helpful   Have you ever had a urinary tract infection (UTI)? A UTI is often called a bladder infection or acute cystitis. Select one.    Yes   Not selected:    No, not that I know of   How much do your current symptoms feel like past UTIs? Select one.    Exactly the same   Not selected:    Mostly the same    Somewhat the same    Totally different   In the past year, how many times have you taken antibiotics for a UTI? Select one.    1 to 3   Not selected:    0    4 or more   When did you last take antibiotics for a UTI? Select one.    More than 3 months ago   Not selected:    Within the last 3 months   Have you ever developed a yeast infection as a result of taking antibiotics? Select one.    Yes   Not selected:    No, not that I know of   UTIs may be more serious when other factors are present. Let's address those now. Are you being treated for type 1 or type 2 diabetes? Select one.    No   Not selected:    Yes   Do you have any of these conditions that can affect the immune system? Scroll to see all options. Select all that apply.    None of these   Not selected:    History of bone marrow transplant    Chronic kidney disease    Chronic liver disease (including cirrhosis)    HIV/AIDS    Inflammatory bowel disease (Crohn's disease or ulcerative colitis)    Lupus    Moderate to severe plaque psoriasis    Multiple sclerosis    Rheumatoid arthritis    Sickle cell anemia    Alpha or beta thalassemia    History of kidney, liver, heart, or other solid organ transplant    History of  liver, heart, or other solid organ transplant    History of spleen removal    An autoimmune disorder not listed here (specify)    A condition requiring treatment with long-term use of oral steroids (such as prednisone, prednisolone, or dexamethasone) (specify)   Have you ever been diagnosed with cancer? Select one.    No   Not selected:    Yes, I have cancer now    Yes, but I'm in remission   These last few questions will help us create the right treatment plan for you. Are you being treated for any of these conditions? Select all that apply.    High blood pressure   Not selected:    Yamilka-Danlos syndrome    Folate deficiency    G6PD deficiency    History of aortic aneurysm or dissection    Marfan syndrome    Megaloblastic anemia    Mono (mononucleosis)    Myasthenia gravis    Oliguria or anuria    Peripheral vascular disease    No   Have you ever had jaundice as a result of taking amoxicillin-clavulanate (Augmentin) or nitrofurantoin (Macrobid)? Select all that apply.    No   Not selected:    Yes, from amoxicillin-clavulanate (Augmentin)    Yes, from nitrofurantoin (Macrobid, Macrodantin)   Are you taking any of these medications? Select all that apply.    An ACE inhibitor such as lisinopril, enalapril, captopril, or benazepril   Not selected:    An angiotensin II receptor blocker (ARB) such as candesartan, irbesartan, losartan, or valsartan    No   Are you still taking these medications listed in your medical record? If you're not taking any of these, click Next. Select all that apply.    lisinopril 5 MG tablet    Mirena (52 MG) 20 MCG/DAY intrauterine device IUD    omeprazole 20 MG capsule   Are you taking any other medications, vitamins, or supplements? Select one.    No   Not selected:    Yes   Have you ever had an allergic or bad reaction to any medication? Select one.    No   Not selected:    Yes   Do you need a doctor's note? A doctor's note confirms that you received care today and states when you can  return to school or work. It does not contain information about your diagnosis or treatment plan. Your provider will make the final decision on whether to give you a doctor's note. Doctor's notes CANNOT be backdated. Select one.    No   Not selected:    Today only (1 day)    Today and tomorrow (2 days)    3 days   Is there anything you'd like to add about your symptoms? Please limit your comments to the symptoms covered in this interview. If you include comments about other concerns, your provider may recommend that you be seen in person.    __Please respond this time, no response to earlier interview __   ----------   Medical history   The following information was received from the EMR on May 24, 2024.   Allergies:    OTHER   - Allergy Type:   - Reaction: Nausea And Vomiting   - Severity: High   - Clinical Status: Active   - Verification Status: Confirmed   Medications:    cyanocobalamin injection 1000 mcg/mL   - Route: Intramuscular   - Start Date: October 20, 2023   - End Date: None   - Status: Active    VITAMIN D 50 MCG (2000 UT) PO CAPS   - Route: Oral   - Start Date: October 20, 2023   - End Date: None   - Status: Active    MULTIVITAMIN ADULT PO   - Route: Oral   - Start Date: February 08, 2018   - End Date: None   - Status: Active    methylPREDNISolone (MEDROL) tablet dose pack (21 ct)   - Route:   - Start Date: February 05, 2024   - End Date: None   - Status: Active    lisinopril (PRINIVIL,ZESTRIL) tablet   - Route: Oral   - Start Date: April 01, 2024   - End Date: None   - Status: Active    MIRENA (52 MG) 20 MCG/DAY IU IUD   - Route: Intrauterine   - Start Date: November 27, 2023   - End Date: None   - Status: Active    azithromycin (ZITHROMAX) tablet   - Route: Oral   - Start Date: February 07, 2024   - End Date: None   - Status: Active    omeprazole (priLOSEC) capsule   - Route: Oral   - Start Date: April 18, 2024   - End Date: None   - Status: Active   Problem list:    Morbid obesity with BMI of  40.0-44.9, adult   - Category: Problem List Item   - Health Status:   - Start Date: February 15, 2016   - End Date: July 27, 2016   - Status: Resolved    Essential hypertension   - Category: Problem List Item   - Health Status:   - Start Date: February 15, 2016   - End Date: October 26, 2016   - Status: Resolved    Dietary counseling   - Category: Problem List Item   - Health Status:   - Start Date: February 15, 2016   - End Date: None   - Status: Active    Edema   - Category: Problem List Item   - Health Status:   - Start Date: April 14, 2016   - End Date: None   - Status: Active    Fatigue   - Category: Problem List Item   - Health Status:   - Start Date: April 14, 2016   - End Date: June 09, 2016   - Status: Resolved    Heartburn   - Category: Problem List Item   - Health Status:   - Start Date: April 14, 2016   - End Date: July 27, 2016   - Status: Resolved    Pain in joint   - Category: Problem List Item   - Health Status:   - Start Date: April 14, 2016   - End Date: None   - Status: Active    BMI 39.0-39.9,adult   - Category: Problem List Item   - Health Status:   - Start Date: May 02, 2016   - End Date: None   - Status: Inactive    BMI 40.0-44.9, adult   - Category: Problem List Item   - Health Status:   - Start Date: May 02, 2016   - End Date: None   - Status: Inactive    Post-op pain   - Category: Problem List Item   - Health Status:   - Start Date: May 05, 2016   - End Date: July 27, 2016   - Status: Resolved    Obesity, Class II, BMI 35-39.9   - Category: Problem List Item   - Health Status:   - Start Date: June 09, 2016   - End Date: July 27, 2016   - Status: Resolved    Obesity, Class I, BMI 30-34.9   - Category: Problem List Item   - Health Status:   - Start Date: July 27, 2016   - End Date: None   - Status: Active    Vitamin D insufficiency   - Category: Problem List Item   - Health Status:   - Start Date: July 27, 2016   - End Date: None   - Status: Active    Pseudotumor cerebri syndrome    - Category: Problem List Item   - Health Status:   - Start Date: October 26, 2016   - End Date: None   - Status: Active    History of hypertension   - Category: Problem List Item   - Health Status:   - Start Date: October 26, 2016   - End Date: None   - Status: Active    Alopecia   - Category: Problem List Item   - Health Status:   - Start Date: October 26, 2016   - End Date: None   - Status: Active    Closed displaced fracture of fifth metatarsal bone of right foot   - Category: Problem List Item   - Health Status:   - Start Date: February 17, 2022   - End Date: None   - Status: Active    BASIL (iron deficiency anemia)   - Category: Problem List Item   - Health Status:   - Start Date: June 07, 2022   - End Date: None   - Status: Active    Malabsorption of iron   - Category: Problem List Item   - Health Status:   - Start Date: June 07, 2022   - End Date: None   - Status: Active    Vitamin B12 deficiency   - Category: Problem List Item   - Health Status:   - Start Date: December 28, 2022   - End Date: None   - Status: Active    Primary hypertension   - Category: Problem List Item   - Health Status:   - Start Date: March 07, 2023   - End Date: None   - Status: Active    IUD (intrauterine device) in place   - Category: Problem List Item   - Health Status:   - Start Date: January 29, 2024   - End Date: None   - Status: Active

## 2024-05-24 NOTE — E-VISIT TREATED
Chief Complaint: Bladder infection (UTI)   Patient introduction   Patient is 44-year-old female. Patient provided the following organ inventory: Presence of vagina.   Patient has had dysuria and frequent urination for less than 24 hours.   Urine is cloudy with a strong or pungent odor.   General presentation   No fever. No nausea or vomiting.   No stomach/pelvic pain.   No back pain.   No flank pain. Difficulty starting, stopping, or delaying urination.   Has not taken antibiotics for current symptoms.   The following treatments were helpful for current symptoms:    Phenazopyridine   The following treatments were not helpful for current symptoms:    Acetaminophen    Ibuprofen   Previous history of UTI. Current symptoms feel exactly the same as previous UTIs. Received treatment for UTI 1 to 3 times in last year. Patient's last use of antibiotics for UTI was more than 3 months ago.   Previously developed yeast infections as a result of taking antibiotics for past UTIs.   No history of pyelonephritis. No history of kidney stones.   Had sexual intercourse in the past week. Does not use diaphragm. No unprotected sexual intercourse with a new partner in the last 2 weeks.   Patient is not being treated for diabetes mellitus.   Review of red flags/alarm symptoms:    No recent hospitalizations or nursing home care (last 3 months)    No history of renal failure    No recent history of urologic instrumentation    No anatomic abnormalities of the urinary tract    No abnormal vaginal discharge    No visible vaginal sores    No pain with sexual intercourse    No abnormal vaginal bleeding or spotting   Pregnancy/menstrual status/breastfeeding:   Patient is not pregnant. Patient is not breastfeeding. Regarding date of last menstrual period, patient writes: 30 days ago .   Current medications   Currently taking methylPREDNISolone 4 MG dose pack and azithromycin 500 MG tablet.   Medication allergies   None.   Medication  contraindication review   Patient is currently taking an ACE inhibitor. Therefore, medications containing trimethoprim will not be prescribed.   Patient is being treated for high blood pressure. Therefore, the following medication(s) will not be prescribed:    Ciprofloxacin   No known history of amoxicillin-clavulanate-associated cholestatic jaundice or nitrofurantoin-associated cholestatic jaundice.   Past medical history   Immune conditions: No immunocompromising conditions.   No history of cancer.   Patient did not request an excuse note.   Assessment   Uncomplicated acute UTI.   This is the likely diagnosis based on patient's interview responses, including:    Symptom profile    Previous history of UTI    Current symptoms are exactly the same as previous UTIs   No recent history of kidney stones.   Plan   Medications:    nitrofurantoin monohydrate/macrocrystals 100 mg capsule RX 100mg 1 cap PO q12h 5d for infection. This medication is an antibiotic. Take it exactly as directed. You must finish the entire course of medication, even if you feel better after taking the first few doses. Amount is 10 cap.    phenazopyridine 200 mg tablet RX 200mg 1 tab PO tid PRN 2d for pain or discomfort associated with your condition. Amount is 6 tab.    fluconazole 150 mg tablet RX 150mg 1 tab PO once 1d as a single dose for vaginal yeast infection. Repeat in 72 hours if symptoms persist. Amount is 2 tab.   The patient's prescriptions will be sent to:   [facility] [id number]   [address] [place] [address] [place] [facility]   Phone: [phone]     Fax: [phone]   Education:    Condition and causes    Prevention    Treatment and self-care    When to call provider   Follow-up:   Patient to follow up as needed for progression or lack of improvement in symptoms within 3d.   ----------   Electronically signed by ROYCE Lopez on 2024-05-24 at 11:00AM   ----------   Patient Interview Transcript:   Knowing about your anatomy  is important for diagnosing and treating UTIs. The gender we have on file for you is female, but we realize this might not tell the whole story. Which of these do you have? Select one.    Vagina   Not selected:    Penis   Which of these symptoms do you have? Select all that apply.    Pain or burning while urinating    Frequent urination   Not selected:    _Sudden urge to urinate and it's hard to hold the urine in _   How long have you had these symptoms? Select one.    Less than 24 hours   Not selected:    1 to 3 days    4 to 6 days    7 to 10 days    More than 10 days   Have you already started taking antibiotics for your current symptoms? Select one.    No   Not selected:    Yes   Since your current symptoms started, has it been difficult to start, stop, or delay urination? Select one.    Yes   Not selected:    No   What color is your urine? Select one.    Cloudy   Not selected:    Clear    Yellow    Pink or red   Does your urine smell strange (like ammonia) or stronger than usual? Select one.    Yes   Not selected:    No   Do you also have any of these symptoms? Select all that apply.    No   Not selected:    Fever    Nausea    Vomiting    Pain, pressure, or discomfort in the lower abdomen    Back pain   Do you have any flank pain? The flank is the side of the body between the ribs and the hips.    No   Not selected:    Yes, in my left flank    Yes, in my right flank    Yes, in both my left and right flanks   Do you have any of these vaginal symptoms? Select all that apply.    No   Not selected:    Abnormal vaginal itching    Unscheduled or abnormal vaginal bleeding or spotting    Pain during sex    Visible sores on the vagina    Abnormal vaginal discharge   In the past 2 weeks, have you had a medical device or instrument placed in your urinary tract? Examples include catheters, stents, and nephrostomy tubes. Select one.    No   Not selected:    Yes   Have you recently been hospitalized or been a resident of a  nursing home or other long-term care facility? This doesn't include emergency room (ER) visits. Select one.    No   Not selected:    Yes, within the last 2 weeks    Yes, within the last 3 months   Kidney failure    No   Not selected:    Within the last year    More than a year ago   Kidney infection (pyelonephritis)    No   Not selected:    Within the last year    More than a year ago   Kidney stones    No   Not selected:    Within the last year    More than a year ago   Kidney transplant    No   Not selected:    Within the last year    More than a year ago   Have you ever been diagnosed with any of these? Select all that apply.    No   Not selected:    Urinary reflux    Bladder diverticula    Single (or horseshoe) kidney    Duplicated urethra   Have you recently held your urine for a long time after you felt the urge to go? Select one.    No   Not selected:    Yes   Have you recently avoided eating or drinking so you wouldn't have the urge to urinate as often? Select one.    No   Not selected:    Yes   Do you use a diaphragm? Select one.    No   Not selected:    Yes   Have you gone through menopause? Select one.    No   Not selected:    Yes    I'm going through it now   Are you pregnant? Select one.    No   Not selected:    Yes   When was your last menstrual period? If you don't currently have periods or no longer have periods, please briefly explain.    __30 days ago __   Are you breastfeeding? Select one.    No   Not selected:    Yes   Have you had sexual intercourse in the past week? Recent sexual intercourse is a risk factor for urinary tract infections. Select one.    Yes   Not selected:    No   Have you had unprotected sexual intercourse with a new partner in the last 2 weeks? Select one.    No   Not selected:    Yes   Have you traveled to any of these countries within the last 3 months? Recent travel to these countries may affect which medication we recommend for your symptoms. Select all that apply.    None  of these   Not selected:    [place]    [place]    [place]    [place]   Acetaminophen (Tylenol)    Not helpful   Not selected:    Helpful   Ibuprofen (Advil, Motrin)    Not helpful   Not selected:    Helpful   Phenazopyridine (Azo, Baridium, Pyridium, Uricalm, Uristat)    Helpful   Not selected:    Not helpful   Have you ever had a urinary tract infection (UTI)? A UTI is often called a bladder infection or acute cystitis. Select one.    Yes   Not selected:    No, not that I know of   How much do your current symptoms feel like past UTIs? Select one.    Exactly the same   Not selected:    Mostly the same    Somewhat the same    Totally different   In the past year, how many times have you taken antibiotics for a UTI? Select one.    1 to 3   Not selected:    0    4 or more   When did you last take antibiotics for a UTI? Select one.    More than 3 months ago   Not selected:    Within the last 3 months   Have you ever developed a yeast infection as a result of taking antibiotics? Select one.    Yes   Not selected:    No, not that I know of   UTIs may be more serious when other factors are present. Let's address those now. Are you being treated for type 1 or type 2 diabetes? Select one.    No   Not selected:    Yes   Do you have any of these conditions that can affect the immune system? Scroll to see all options. Select all that apply.    None of these   Not selected:    History of bone marrow transplant    Chronic kidney disease    Chronic liver disease (including cirrhosis)    HIV/AIDS    Inflammatory bowel disease (Crohn's disease or ulcerative colitis)    Lupus    Moderate to severe plaque psoriasis    Multiple sclerosis    Rheumatoid arthritis    Sickle cell anemia    Alpha or beta thalassemia    History of kidney, liver, heart, or other solid organ transplant    History of liver, heart, or other solid organ transplant    History of spleen removal    An autoimmune disorder not listed here (specify)    A condition requiring  treatment with long-term use of oral steroids (such as prednisone, prednisolone, or dexamethasone) (specify)   Have you ever been diagnosed with cancer? Select one.    No   Not selected:    Yes, I have cancer now    Yes, but I'm in remission   These last few questions will help us create the right treatment plan for you. Are you being treated for any of these conditions? Select all that apply.    High blood pressure   Not selected:    Yamilka-Danlos syndrome    Folate deficiency    G6PD deficiency    History of aortic aneurysm or dissection    Marfan syndrome    Megaloblastic anemia    Mono (mononucleosis)    Myasthenia gravis    Oliguria or anuria    Peripheral vascular disease    No   Have you ever had jaundice as a result of taking amoxicillin-clavulanate (Augmentin) or nitrofurantoin (Macrobid)? Select all that apply.    No   Not selected:    Yes, from amoxicillin-clavulanate (Augmentin)    Yes, from nitrofurantoin (Macrobid, Macrodantin)   Are you taking any of these medications? Select all that apply.    An ACE inhibitor such as lisinopril, enalapril, captopril, or benazepril   Not selected:    An angiotensin II receptor blocker (ARB) such as candesartan, irbesartan, losartan, or valsartan    No   Are you still taking these medications listed in your medical record? If you're not taking any of these, click Next. Select all that apply.    methylPREDNISolone 4 MG dose pack    azithromycin 500 MG tablet   Are you taking any other medications, vitamins, or supplements? Select one.    No   Not selected:    Yes   Have you ever had an allergic or bad reaction to any medication? Select one.    No   Not selected:    Yes   Do you need a doctor's note? A doctor's note confirms that you received care today and states when you can return to school or work. It does not contain information about your diagnosis or treatment plan. Your provider will make the final decision on whether to give you a doctor's note. Doctor's notes  CANNOT be backdated. Select one.    No   Not selected:    Today only (1 day)    Today and tomorrow (2 days)    3 days   Is there anything you'd like to add about your symptoms? Please limit your comments to the symptoms covered in this interview. If you include comments about other concerns, your provider may recommend that you be seen in person.   The patient did not enter any additional information.   ----------   Medical history   The following information was received from the EMR on May 24, 2024.   Allergies:    OTHER   - Allergy Type:   - Reaction: Nausea And Vomiting   - Severity: High   - Clinical Status: Active   - Verification Status: Confirmed   Medications:    cyanocobalamin injection 1000 mcg/mL   - Route: Intramuscular   - Start Date: October 20, 2023   - End Date: None   - Status: Active    VITAMIN D 50 MCG (2000 UT) PO CAPS   - Route: Oral   - Start Date: October 20, 2023   - End Date: None   - Status: Active    MULTIVITAMIN ADULT PO   - Route: Oral   - Start Date: February 08, 2018   - End Date: None   - Status: Active    methylPREDNISolone (MEDROL) tablet dose pack (21 ct)   - Route:   - Start Date: February 05, 2024   - End Date: None   - Status: Active    lisinopril (PRINIVIL,ZESTRIL) tablet   - Route: Oral   - Start Date: April 01, 2024   - End Date: None   - Status: Active    MIRENA (52 MG) 20 MCG/DAY IU IUD   - Route: Intrauterine   - Start Date: November 27, 2023   - End Date: None   - Status: Active    azithromycin (ZITHROMAX) tablet   - Route: Oral   - Start Date: February 07, 2024   - End Date: None   - Status: Active    omeprazole (priLOSEC) capsule   - Route: Oral   - Start Date: April 18, 2024   - End Date: None   - Status: Active   Problem list:    Morbid obesity with BMI of 40.0-44.9, adult   - Category: Problem List Item   - Health Status:   - Start Date: February 15, 2016   - End Date: July 27, 2016   - Status: Resolved    Essential hypertension   - Category: Problem List Item    - Health Status:   - Start Date: February 15, 2016   - End Date: October 26, 2016   - Status: Resolved    Dietary counseling   - Category: Problem List Item   - Health Status:   - Start Date: February 15, 2016   - End Date: None   - Status: Active    Edema   - Category: Problem List Item   - Health Status:   - Start Date: April 14, 2016   - End Date: None   - Status: Active    Fatigue   - Category: Problem List Item   - Health Status:   - Start Date: April 14, 2016   - End Date: June 09, 2016   - Status: Resolved    Heartburn   - Category: Problem List Item   - Health Status:   - Start Date: April 14, 2016   - End Date: July 27, 2016   - Status: Resolved    Pain in joint   - Category: Problem List Item   - Health Status:   - Start Date: April 14, 2016   - End Date: None   - Status: Active    BMI 39.0-39.9,adult   - Category: Problem List Item   - Health Status:   - Start Date: May 02, 2016   - End Date: None   - Status: Inactive    BMI 40.0-44.9, adult   - Category: Problem List Item   - Health Status:   - Start Date: May 02, 2016   - End Date: None   - Status: Inactive    Post-op pain   - Category: Problem List Item   - Health Status:   - Start Date: May 05, 2016   - End Date: July 27, 2016   - Status: Resolved    Obesity, Class II, BMI 35-39.9   - Category: Problem List Item   - Health Status:   - Start Date: June 09, 2016   - End Date: July 27, 2016   - Status: Resolved    Obesity, Class I, BMI 30-34.9   - Category: Problem List Item   - Health Status:   - Start Date: July 27, 2016   - End Date: None   - Status: Active    Vitamin D insufficiency   - Category: Problem List Item   - Health Status:   - Start Date: July 27, 2016   - End Date: None   - Status: Active    Pseudotumor cerebri syndrome   - Category: Problem List Item   - Health Status:   - Start Date: October 26, 2016   - End Date: None   - Status: Active    History of hypertension   - Category: Problem List Item   - Health Status:   - Start Date: October  26, 2016   - End Date: None   - Status: Active    Alopecia   - Category: Problem List Item   - Health Status:   - Start Date: October 26, 2016   - End Date: None   - Status: Active    Closed displaced fracture of fifth metatarsal bone of right foot   - Category: Problem List Item   - Health Status:   - Start Date: February 17, 2022   - End Date: None   - Status: Active    BASIL (iron deficiency anemia)   - Category: Problem List Item   - Health Status:   - Start Date: June 07, 2022   - End Date: None   - Status: Active    Malabsorption of iron   - Category: Problem List Item   - Health Status:   - Start Date: June 07, 2022   - End Date: None   - Status: Active    Vitamin B12 deficiency   - Category: Problem List Item   - Health Status:   - Start Date: December 28, 2022   - End Date: None   - Status: Active    Primary hypertension   - Category: Problem List Item   - Health Status:   - Start Date: March 07, 2023   - End Date: None   - Status: Active    IUD (intrauterine device) in place   - Category: Problem List Item   - Health Status:   - Start Date: January 29, 2024   - End Date: None   - Status: Active

## 2024-05-24 NOTE — EXTERNAL PATIENT INSTRUCTIONS
Diagnosis   Urinary tract infection (UTI)   My name is ROYCE Lopez. I'm a healthcare provider at [facility]. After reviewing your interview, I see you have a urinary tract infection (UTI).   Medications   Your pharmacy   [facility] [id number] [address] [place] [address] [place] [address] [phone]     Prescription   Nitrofurantoin monohydrate/macrocrystalline (100mg): Take 1 capsule by mouth every 12 hours for 5 days for infection. This medication is an antibiotic. Take it exactly as directed. You must finish the entire course of medication, even if you feel better after taking the first few doses.   Fluconazole (150mg): Take 1 tablet by mouth once for 1 day as a single dose. Use this medication only if you develop a yeast infection. If yeast infection symptoms are still present 3 days after taking the first tablet, take the second tablet.   Phenazopyridine (200mg): Take 1 tablet by mouth 3 times a day as needed for 2 days for pain or discomfort associated with your condition.    I've given you a prescription dose of phenazopyridine. If it's more affordable or convenient, you may use the equivalent amount of non-prescription phenazopyridine. For example, instead of taking one 200 mg phenazopyridine tablet, you may take two 95 mg phenazopyridine tablets.    Because you've developed yeast infections after taking antibiotics in the past, I've prescribed Diflucan (fluconazole). Diflucan is an oral antifungal that treats yeast infections. Use this medication only if you develop a yeast infection.   About your diagnosis   A UTI is an infection of one or more parts of the urinary tract, most commonly the bladder.   Most UTIs are caused by bacteria (usually E. coli) that travel up the urethra and into the bladder. I see that you have some common signs and symptoms of a UTI:    Pain or burning while urinating    Frequent urination    Symptoms that feel a lot like past UTIs    Symptoms that began shortly  after sexual intercourse   Fortunately, most UTIs aren't serious, and they're easily treated with antibiotics. Make sure you take all of the antibiotic pills given to you, even if you start to feel better after the first few doses. Otherwise, the UTI might come back.   What to expect   If you follow this treatment plan, you should start to feel better within 1 to 2 days.   When to seek care   Call us at [phone]   with any sudden or unexpected symptoms.    Symptoms that don't improve or get worse in the next 48 hours    Fever that goes above 101F or lasts longer than 24 hours    Shaking or chills    Nausea or vomiting    Severe flank pain (pain in your back or side)   Other treatment    Rest and drink plenty of water    Urinate frequently and when you first feel the urge    Place a heating pad on your back or stomach to help relieve some of the discomfort   Prevention    Drink a lot of liquids to help flush bacteria from your system. Water is best. Try for six to eight, 8-ounce glasses a day on a regular basis.    Urinate often and when you first feel the urge. Bacteria can grow when urine stays in the bladder too long. Urinate after sex to flush away bacteria.    After using the toilet, always wipe from front to back. This step is most important after a bowel movement. Wiping from front to back prevents bacteria normally found in stool from entering the urinary tract.   Your provider   Your diagnosis was provided by ROYCE Lopez, a member of your trusted care team at [facility].   If you have any questions, call us at [phone]  .

## 2024-08-05 ENCOUNTER — OFFICE VISIT (OUTPATIENT)
Dept: FAMILY MEDICINE CLINIC | Facility: CLINIC | Age: 44
End: 2024-08-05
Payer: COMMERCIAL

## 2024-08-05 VITALS
DIASTOLIC BLOOD PRESSURE: 81 MMHG | HEART RATE: 106 BPM | SYSTOLIC BLOOD PRESSURE: 132 MMHG | HEIGHT: 65 IN | TEMPERATURE: 98 F | WEIGHT: 198.8 LBS | BODY MASS INDEX: 33.12 KG/M2 | RESPIRATION RATE: 16 BRPM | OXYGEN SATURATION: 98 %

## 2024-08-05 DIAGNOSIS — I10 PRIMARY HYPERTENSION: ICD-10-CM

## 2024-08-05 DIAGNOSIS — Z11.59 NEED FOR HEPATITIS C SCREENING TEST: ICD-10-CM

## 2024-08-05 DIAGNOSIS — E53.8 VITAMIN B12 DEFICIENCY: ICD-10-CM

## 2024-08-05 DIAGNOSIS — Z00.00 WELL ADULT EXAM: Primary | ICD-10-CM

## 2024-08-05 DIAGNOSIS — F32.A DEPRESSIVE DISORDER: ICD-10-CM

## 2024-08-05 DIAGNOSIS — Z12.11 SCREEN FOR COLON CANCER: ICD-10-CM

## 2024-08-05 DIAGNOSIS — E55.9 VITAMIN D INSUFFICIENCY: ICD-10-CM

## 2024-08-05 PROCEDURE — 99396 PREV VISIT EST AGE 40-64: CPT | Performed by: NURSE PRACTITIONER

## 2024-08-05 RX ORDER — PAROXETINE 10 MG/1
10 TABLET, FILM COATED ORAL EVERY MORNING
COMMUNITY
Start: 2024-07-08

## 2024-08-05 NOTE — PROGRESS NOTES
Christy TORRES [facility]  [id number]  1980  female     08/05/2024      Chief Complaint  Annual Exam (Patient has GYN. Declines in office exam/)    History of Present Illness  44-year-old female patient presents today for well adult exam.  Screened for depression, see questionnaire.  Denies suicidal ideation/plan.  On Paxil.  States she sees [facility] in [place].  States she had mammogram and Pap exam done on November 27, 2023 which were both negative.  Blood pressure stable today.  Tolerating lisinopril well.  Discussed colonoscopy versus Cologuard and benefits of colonoscopy.  Patient refused colonoscopy and states she wants to do Cologuard for colon cancer screening.  Reviewed last set of labs from May 2023.  Patient agrees to come back in for fasting labs in 2 weeks.  Patient agrees on hep C screening.  Denies history of substance use.  Denies headache, lightheadedness, dizziness, numbness, tingling, cough, shortness of breath, chest pain, palpitations, leg swelling, abdominal pain, NVD, dysuria, rash.      BMI is >= 30 and <35. (Class 1 Obesity). The following options were offered after discussion;: exercise counseling/recommendations and nutrition counseling/recommendations       Review of Systems    Past Medical History:   Diagnosis Date    Abnormal Pap smear of cervix     Anemia 2017?    RNY gastric bypass. Periodically get ferritin infusions    Edema     Fatigue     Gall stones     Gastric band slippage     RESOLVED    History of colposcopy     Hypertension     Atypical. Was in pain with this BP    Joint pain     Joint pain, knee     Obesity, morbid     Plantar fasciitis     HISTORY    PONV (postoperative nausea and vomiting)     SEVERE N/V WITH GAS WITH GASTRIC BANDING    Pseudotumor cerebri     Urge and stress incontinence     UTI (urinary tract infection)        Past Surgical History:   Procedure Laterality Date    BREAST BIOPSY Right     @[facility]---no clip placed    CHOLECYSTECTOMY      COLONOSCOPY       "Fiberoptic    GASTRIC BANDING REMOVAL      Laparosc Restrictive Proc Adjustable Gastric Band Removal    INTRAUTERINE DEVICE INSERTION      LAPAROSCOPIC GASTRIC BANDING      Laparosc Restrictive Proc Adjustable Gastric Band Placement    NV LAPS GSTR RSTCV PX W/BYP ANCA-EN-Y LIMB <150 CM N/A 05/02/2016    Procedure: REVISION GASTRIC BYPASS LAPAROSCOPIC , PREVIOUS LAP BAND,WITH LYSIS OF ADHESIONS;  Surgeon: Quinn Enrique Jr., MD;  [place] [facility] [place] [facility] [place]  Service: General       Family History   Problem Relation Age of Onset    Hypertension Father     Obesity Mother     Sleep apnea Mother     Hypertension Mother     Heart disease Paternal Grandfather     Colon cancer Paternal Grandfather 45    Heart disease Paternal Grandmother     Heart disease Maternal Grandmother     Heart disease Maternal Grandfather        Social History     Socioeconomic History    Marital status:    Tobacco Use    Smoking status: Never    Smokeless tobacco: Never   Vaping Use    Vaping status: Never Used   Substance and Sexual Activity    Alcohol use: Yes     Alcohol/week: 2.0 standard drinks of alcohol     Types: 2 Drinks containing 0.5 oz of alcohol per week     Comment: occ    Drug use: No    Sexual activity: Yes     Partners: Male     Birth control/protection: Vasectomy, I.U.D.     Comment: vasectomy        Allergies   Allergen Reactions    Other Nausea And Vomiting     Anesthesia gas     Only gas-- not iv         Objective   Vital Signs:   /81 (BP Location: Left arm, Patient Position: Sitting, Cuff Size: Small Adult) Comment (BP Location): Forearm  Pulse 106   Temp 98 °F (36.7 °C) (Temporal)   Resp 16   Ht 165.1 cm (65\")   Wt 90.2 kg (198 lb 12.8 oz)   SpO2 98%   BMI 33.08 kg/m²       Physical Exam            Assessment and Plan   Diagnoses and all orders for this visit:    1. Well adult exam (Primary)  -     Cologuard - Stool, Per Rectum; Future  -     TSH Rfx On Abnormal To Free T4; Future  -     Lipid Panel; " Future  -     Hemoglobin A1c; Future  -     Comprehensive metabolic panel; Future  -     CBC w AUTO Differential; Future  -     Vitamin D 25 hydroxy; Future  -     Vitamin B12; Future  -     Hepatitis C antibody; Future    2. Screen for colon cancer  -     Cologuard - Stool, Per Rectum; Future    3. Vitamin D insufficiency  -     Vitamin D 25 hydroxy; Future    4. Vitamin B12 deficiency  -     Vitamin B12; Future    5. Need for hepatitis C screening test  -     Hepatitis C antibody; Future    6. Primary hypertension  -     TSH Rfx On Abnormal To Free T4; Future  -     Lipid Panel; Future  -     Hemoglobin A1c; Future  -     Comprehensive metabolic panel; Future  -     CBC w AUTO Differential; Future    7. Depressive disorder    -Well adult exam.    -Mammogram November 27, 2023 negative.  -Pap exam November 27, 2023 negative.    Depression  -Stable.  Continue current dose of Paxil.  -Screened for depression.  Denies suicidal ideation/plan.    -Discussed benefits of colonoscopy.  Patient refused colonoscopy.  -Ordered Cologuard for colon cancer screening.    Hypertension  -Stable.  Continue current antihypertensive medications which include lisinopril 5 mg daily.    -Pending fasting labs in 2 weeks.    -Recommended routine dental and eye exams.    -Discussed healthy eating habits and incorporating routine daily exercise.    -Discussed and recommended age-appropriate vaccinations.  Patient states her Tdap vaccine has been within the past 10 years.    -Discussed ER red flags.  -Instructed patient to follow-up with me in 6 months for hypertension/depression.    Follow Up   Return in about 6 months (around 2/5/2025) for Recheck.    There are no Patient Instructions on file for this visit.

## 2024-08-26 ENCOUNTER — CLINICAL SUPPORT (OUTPATIENT)
Dept: FAMILY MEDICINE CLINIC | Facility: CLINIC | Age: 44
End: 2024-08-26
Payer: COMMERCIAL

## 2024-08-26 DIAGNOSIS — I10 PRIMARY HYPERTENSION: ICD-10-CM

## 2024-08-26 DIAGNOSIS — Z00.00 WELL ADULT EXAM: ICD-10-CM

## 2024-08-26 DIAGNOSIS — Z11.59 NEED FOR HEPATITIS C SCREENING TEST: ICD-10-CM

## 2024-08-26 DIAGNOSIS — E55.9 VITAMIN D INSUFFICIENCY: ICD-10-CM

## 2024-08-26 DIAGNOSIS — E53.8 VITAMIN B12 DEFICIENCY: ICD-10-CM

## 2024-08-26 LAB
BASOPHILS # BLD AUTO: 0.04 10*3/MM3 (ref 0–0.2)
BASOPHILS NFR BLD AUTO: 0.6 % (ref 0–1.5)
DEPRECATED RDW RBC AUTO: 47.8 FL (ref 37–54)
EOSINOPHIL # BLD AUTO: 0.05 10*3/MM3 (ref 0–0.4)
EOSINOPHIL NFR BLD AUTO: 0.7 % (ref 0.3–6.2)
ERYTHROCYTE [DISTWIDTH] IN BLOOD BY AUTOMATED COUNT: 14 % (ref 12.3–15.4)
HCT VFR BLD AUTO: 44.2 % (ref 34–46.6)
HGB BLD-MCNC: 14.3 G/DL (ref 12–15.9)
IMM GRANULOCYTES # BLD AUTO: 0.03 10*3/MM3 (ref 0–0.05)
IMM GRANULOCYTES NFR BLD AUTO: 0.4 % (ref 0–0.5)
LYMPHOCYTES # BLD AUTO: 1.14 10*3/MM3 (ref 0.7–3.1)
LYMPHOCYTES NFR BLD AUTO: 16.6 % (ref 19.6–45.3)
MCH RBC QN AUTO: 30.4 PG (ref 26.6–33)
MCHC RBC AUTO-ENTMCNC: 32.4 G/DL (ref 31.5–35.7)
MCV RBC AUTO: 93.8 FL (ref 79–97)
MONOCYTES # BLD AUTO: 0.63 10*3/MM3 (ref 0.1–0.9)
MONOCYTES NFR BLD AUTO: 9.2 % (ref 5–12)
NEUTROPHILS NFR BLD AUTO: 4.99 10*3/MM3 (ref 1.7–7)
NEUTROPHILS NFR BLD AUTO: 72.5 % (ref 42.7–76)
NRBC BLD AUTO-RTO: 0 /100 WBC (ref 0–0.2)
PLATELET # BLD AUTO: 285 10*3/MM3 (ref 140–450)
PMV BLD AUTO: 9.8 FL (ref 6–12)
RBC # BLD AUTO: 4.71 10*6/MM3 (ref 3.77–5.28)
TSH SERPL DL<=0.05 MIU/L-ACNC: 1.7 UIU/ML (ref 0.27–4.2)
WBC NRBC COR # BLD AUTO: 6.88 10*3/MM3 (ref 3.4–10.8)

## 2024-08-26 PROCEDURE — 80050 GENERAL HEALTH PANEL: CPT | Performed by: NURSE PRACTITIONER

## 2024-08-26 PROCEDURE — 82306 VITAMIN D 25 HYDROXY: CPT | Performed by: NURSE PRACTITIONER

## 2024-08-26 PROCEDURE — 36415 COLL VENOUS BLD VENIPUNCTURE: CPT | Performed by: NURSE PRACTITIONER

## 2024-08-26 PROCEDURE — 80061 LIPID PANEL: CPT | Performed by: NURSE PRACTITIONER

## 2024-08-26 PROCEDURE — 86803 HEPATITIS C AB TEST: CPT | Performed by: NURSE PRACTITIONER

## 2024-08-26 PROCEDURE — 83036 HEMOGLOBIN GLYCOSYLATED A1C: CPT | Performed by: NURSE PRACTITIONER

## 2024-08-26 PROCEDURE — 82607 VITAMIN B-12: CPT | Performed by: NURSE PRACTITIONER

## 2024-08-26 NOTE — PROGRESS NOTES
Venipuncture Blood Specimen Collection  Venipuncture performed in RT ARM VIA VENIPUNCTURE by Pedrito Shankar with good hemostasis. Patient tolerated the procedure well without complications.   08/26/24   Pedrito Shankar

## 2024-08-27 LAB
25(OH)D3 SERPL-MCNC: 43.8 NG/ML (ref 30–100)
ALBUMIN SERPL-MCNC: 4.1 G/DL (ref 3.5–5.2)
ALBUMIN/GLOB SERPL: 1.4 G/DL
ALP SERPL-CCNC: 90 U/L (ref 39–117)
ALT SERPL W P-5'-P-CCNC: 59 U/L (ref 1–33)
ANION GAP SERPL CALCULATED.3IONS-SCNC: 10 MMOL/L (ref 5–15)
AST SERPL-CCNC: 54 U/L (ref 1–32)
BILIRUB SERPL-MCNC: 0.9 MG/DL (ref 0–1.2)
BUN SERPL-MCNC: 8 MG/DL (ref 6–20)
BUN/CREAT SERPL: 13.3 (ref 7–25)
CALCIUM SPEC-SCNC: 9.6 MG/DL (ref 8.6–10.5)
CHLORIDE SERPL-SCNC: 98 MMOL/L (ref 98–107)
CHOLEST SERPL-MCNC: 152 MG/DL (ref 0–200)
CO2 SERPL-SCNC: 27 MMOL/L (ref 22–29)
CREAT SERPL-MCNC: 0.6 MG/DL (ref 0.57–1)
EGFRCR SERPLBLD CKD-EPI 2021: 112.3 ML/MIN/1.73
GLOBULIN UR ELPH-MCNC: 2.9 GM/DL
GLUCOSE SERPL-MCNC: 82 MG/DL (ref 65–99)
HBA1C MFR BLD: 4.9 % (ref 4.8–5.6)
HCV AB SER QL: NORMAL
HDLC SERPL-MCNC: 56 MG/DL (ref 40–60)
LDLC SERPL CALC-MCNC: 82 MG/DL (ref 0–100)
LDLC/HDLC SERPL: 1.47 {RATIO}
POTASSIUM SERPL-SCNC: 4.5 MMOL/L (ref 3.5–5.2)
PROT SERPL-MCNC: 7 G/DL (ref 6–8.5)
SODIUM SERPL-SCNC: 135 MMOL/L (ref 136–145)
TRIGL SERPL-MCNC: 69 MG/DL (ref 0–150)
VIT B12 BLD-MCNC: 302 PG/ML (ref 211–946)
VLDLC SERPL-MCNC: 14 MG/DL (ref 5–40)

## 2024-10-20 DIAGNOSIS — Z76.0 MEDICATION REFILL: ICD-10-CM

## 2024-10-20 DIAGNOSIS — I10 PRIMARY HYPERTENSION: ICD-10-CM

## 2024-10-20 DIAGNOSIS — K21.9 GASTROESOPHAGEAL REFLUX DISEASE, UNSPECIFIED WHETHER ESOPHAGITIS PRESENT: ICD-10-CM

## 2024-10-21 RX ORDER — LISINOPRIL 5 MG/1
5 TABLET ORAL DAILY
Qty: 90 TABLET | Refills: 1 | Status: SHIPPED | OUTPATIENT
Start: 2024-10-21

## 2024-12-09 ENCOUNTER — TELEPHONE (OUTPATIENT)
Dept: ONCOLOGY | Facility: CLINIC | Age: 44
End: 2024-12-09

## 2024-12-09 NOTE — PROGRESS NOTES
HEMATOLOGY ONCOLOGY OUTPATIENT FOLLOWUP      Patient name: Christy Castillo  : 1980  MRN: [id number]  Primary Care Physician: Grover Ventura APRN  Referring Physician: Grover Ventura APRN  Reason For Consult:     Chief Complaint   Patient presents with    Follow-up     Malabsorption of iron       History of Present Illness:  Christy Castillo is 44 y.o. female who presented to our office on 22 for consultation regarding Iron deficiency Anemia    Patient had labs drawn in 2022     Hb 9.5 , hct 32.3, MCV 77.8, plt 372, no prior CBC recently. 3-4 yrs ago hemoglobin was normal.  Patient was given oral iron.    2022 - Hb 10.1, hct 34, MCV 76.2, plt 379, vitamin B12 336, ferritin 6.31, iron sat 5, TIBC 550  Patient has been taking oral iron. No rectal bleeding.   2022 - iv venofer x4   2022 - Hb 11.6, WBC 7.94  2023 -iron saturation 4, TIBC 578, hemoglobin 9.6, hematocrit 33.5, platelet 398 vitamin B12 154  23 - iron venofer x4    Subjective:  12/10/2024: Patient here today for follow-up.  She was last seen by Dr. Lipscomb in 2023 and missed her follow-up appointment.  She states today she has been having fatigue that is been ongoing for the last week and a half as well as some cold intolerance.  Otherwise no new complaints.  She states that since she received her Mirena her menstrual period does have improved greatly.  She denies any bleeding symptoms or concerns.    The following portions of the patient's history were reviewed and updated as appropriate: allergies, current medications, past family history, past medical history, past social history, past surgical history and problem list.    Past Medical History:   Diagnosis Date    Abnormal Pap smear of cervix     Anemia 2017?    RNY gastric bypass. Periodically get ferritin infusions    Edema     Fatigue     Gall stones     Gastric band slippage     RESOLVED    History of colposcopy     Hypertension      Atypical. Was in pain with this BP    Joint pain     Joint pain, knee     Obesity, morbid     Plantar fasciitis     HISTORY    PONV (postoperative nausea and vomiting)     SEVERE N/V WITH GAS WITH GASTRIC BANDING    Pseudotumor cerebri     Urge and stress incontinence     UTI (urinary tract infection)        Past Surgical History:   Procedure Laterality Date    BREAST BIOPSY Right     @[facility]---no clip placed    CHOLECYSTECTOMY      COLONOSCOPY      Fiberoptic    GASTRIC BANDING REMOVAL      Laparosc Restrictive Proc Adjustable Gastric Band Removal    INTRAUTERINE DEVICE INSERTION      LAPAROSCOPIC GASTRIC BANDING      Laparosc Restrictive Proc Adjustable Gastric Band Placement    MT LAPS GSTR RSTCV PX W/BYP ANCA-EN-Y LIMB <150 CM N/A 05/02/2016    Procedure: REVISION GASTRIC BYPASS LAPAROSCOPIC , PREVIOUS LAP BAND,WITH LYSIS OF ADHESIONS;  Surgeon: Quinn Enrique Jr., MD;  [place] [facility] [place] [facility] [place]  Service: General         Current Outpatient Medications:     cyanocobalamin 1000 MCG/ML injection, Inject 1 mL into the appropriate muscle as directed by prescriber Every 30 (Thirty) Days. 1st of the month, Disp: 1 mL, Rfl: 1    Levonorgestrel (Mirena, 52 MG,) 20 MCG/DAY intrauterine device IUD, To be inserted one time by prescriber. Route intrauterine., Disp: 1 each, Rfl: 0    lisinopril (PRINIVIL,ZESTRIL) 5 MG tablet, Take 1 tablet by mouth Daily., Disp: 90 tablet, Rfl: 1    Multiple Vitamins-Minerals (MULTIVITAMIN ADULT PO), Take 1 tablet by mouth Daily., Disp: , Rfl:     omeprazole (priLOSEC) 20 MG capsule, Take 1 capsule by mouth Daily., Disp: 90 capsule, Rfl: 0    PARoxetine (Paxil) 10 MG tablet, Take 1 tablet by mouth Every Morning., Disp: , Rfl:     Cholecalciferol (Vitamin D) 50 MCG (2000 UT) capsule, Take 1 capsule by mouth Daily. (Patient not taking: Reported on 12/10/2024), Disp: 30 capsule, Rfl: 0    Allergies   Allergen Reactions    Other Nausea And Vomiting     Anesthesia gas     Only gas-- not iv  "      Family History   Problem Relation Age of Onset    Hypertension Father     Obesity Mother     Sleep apnea Mother     Hypertension Mother     Heart disease Paternal Grandfather     Colon cancer Paternal Grandfather 45    Heart disease Paternal Grandmother     Heart disease Maternal Grandmother     Heart disease Maternal Grandfather        Cancer-related family history includes Colon cancer (age of onset: 45) in her paternal grandfather.    Social History     Tobacco Use    Smoking status: Never    Smokeless tobacco: Never   Vaping Use    Vaping status: Never Used   Substance Use Topics    Alcohol use: Yes     Alcohol/week: 2.0 standard drinks of alcohol     Types: 2 Drinks containing 0.5 oz of alcohol per week     Comment: occ    Drug use: No     Social History     Social History Narrative    Not on file          Objective:    Vitals:    12/10/24 0907   BP: 133/89   Pulse: 92   Temp: 98.7 °F (37.1 °C)   SpO2: 97%   Weight: 111 kg (245 lb)   Height: 165.1 cm (65\")   PainSc: 0-No pain         Body mass index is 40.77 kg/m².  ECOG  (0) Fully active, able to carry on all predisease performance without restriction    Physical Exam:     Physical Exam  Vitals reviewed.   Constitutional:       General: She is not in acute distress.     Appearance: Normal appearance. She is not toxic-appearing.   HENT:      Head: Normocephalic and atraumatic.   Eyes:      General: No scleral icterus.     Pupils: Pupils are equal, round, and reactive to light.   Cardiovascular:      Rate and Rhythm: Normal rate and regular rhythm.      Pulses: Normal pulses.      Heart sounds: No murmur heard.  Pulmonary:      Effort: Pulmonary effort is normal.      Breath sounds: Normal breath sounds.   Abdominal:      General: There is no distension.      Palpations: Abdomen is soft. There is no mass.      Tenderness: There is no abdominal tenderness.   Musculoskeletal:         General: No swelling. Normal range of motion.      Cervical back: Normal " "range of motion and neck supple.   Skin:     General: Skin is warm.      Coloration: Skin is not jaundiced or pale.      Findings: No bruising, erythema, lesion or rash.   Neurological:      General: No focal deficit present.      Mental Status: She is alert and oriented to person, place, and time.      Cranial Nerves: No cranial nerve deficit.      Motor: No weakness.      Gait: Gait normal.   Psychiatric:         Mood and Affect: Mood normal.         Behavior: Behavior normal.         Thought Content: Thought content normal.         Judgment: Judgment normal.           Lab Results - Last 18 Months   Lab Units 12/10/24  0855 08/26/24  0836 08/18/23  0834   WBC 10*3/mm3 5.44 6.88 6.82   HEMOGLOBIN g/dL 14.0 14.3 9.6*   HEMATOCRIT % 44.1 44.2 33.5*   PLATELETS 10*3/mm3 254 285 398   MCV fL 97.6* 93.8 76.3*     Lab Results - Last 18 Months   Lab Units 08/26/24  0836   SODIUM mmol/L 135*   POTASSIUM mmol/L 4.5   CHLORIDE mmol/L 98   CO2 mmol/L 27.0   BUN mg/dL 8   CREATININE mg/dL 0.60   CALCIUM mg/dL 9.6   BILIRUBIN mg/dL 0.9   ALK PHOS U/L 90   ALT (SGPT) U/L 59*   AST (SGOT) U/L 54*   GLUCOSE mg/dL 82       Lab Results   Component Value Date    GLUCOSE 82 08/26/2024    BUN 8 08/26/2024    CREATININE 0.60 08/26/2024    EGFRIFNONA 96 02/21/2022    EGFRIFAFRI >60 12/31/2020    BCR 13.3 08/26/2024    K 4.5 08/26/2024    CO2 27.0 08/26/2024    CALCIUM 9.6 08/26/2024    ALBUMIN 4.1 08/26/2024    AST 54 (H) 08/26/2024    ALT 59 (H) 08/26/2024       No results for input(s): \"APTT\", \"INR\", \"PTT\" in the last 84594 hours.    Lab Results   Component Value Date    IRON 21 (L) 08/18/2023    TIBC 578 (H) 08/18/2023    FERRITIN 8.13 (L) 08/18/2023       Lab Results   Component Value Date    FOLATE 5.70 11/04/2016       No results found for: \"OCCULTBLD\"    No results found for: \"RETICCTPCT\"    Lab Results   Component Value Date    [id number] 302 08/26/2024     No results found for: \"SPEP\", \"UPEP\"  No results found for: \"LDH\", " "\"URICACID\"  No results found for: \"MARTIN\", \"RF\", \"SEDRATE\"  No results found for: \"FIBRINOGEN\", \"HAPTOGLOBIN\"    No results found for: \"PTT\", \"INR\"  No results found for: \"\"  No results found for: \"CEA\"  No components found for: \"CA-19-9\"  No results found for: \"PSA\"   No results found for: \"AFPTM\", \"KW7783\", \"HCGTM\", \"SPEP\", \"MIKEY\"         Assessment & Plan     Patient is a 44 yr old female with microcytic anemia secondary to iron deficiency.     Iron deficiency Anemia  Microcytic anemia with elevated TIBC and low iron sat points to severe iron deficiency.  With oral iron malabsorption, was given iv iron infusion with venofer for 4 doses, with improvement in iron levels.  Likely reason is poor absorption with gastric bypass. She has oral iron intolerance  Hemoglobin is normal at 14.0 g/dL today 12/10/2024.  Iron panel is pending.  If iron deficiency noted would recommend IV iron infusions due to poor oral iron absorption and increased fatigue.  -If iron panel does not show iron deficiency, would recommend follow-up with PCP for further evaluation of other etiologies for fatigue.    Menorrhagia  Had Mirena placed, improved.    Vitamin B12 injection  Recommend continued monthly B12 injection per PCP      Follow-up with MD in 3 months with repeat labs, sooner if condition indicates     Thank you very much for providing the opportunity to participate in this patient’s care. Please do not hesitate to call if there are any other questions  Greer Natarajan PA-C       Time spent on encounter including record review, history taking, exam, discussion, counseling and documentation at: 30 minutes     "

## 2024-12-09 NOTE — TELEPHONE ENCOUNTER
Caller: Christy Castillo    Relationship: Self    Best call back number: [phone]    What is the best time to reach you: ANYTIME    Who are you requesting to speak with (clinical staff, provider,  specific staff member): CLINICAL           What was the call regarding:     WOULD LIKE TO MAKE APPT FOR LABS AND FOLLOW UP     FOR LOW IRON, HAS BEEN FATIGUED NOTICED IN THE LAST WEEK ,     ALSO NOTICING A LITTLE SHORTNESS OF BREATH  WHEN BEEN UP  MOVING AROUND MORE,     CAN WORK AROUND ANYTIME FOR APPOINTMENT         Is it okay if the provider responds through MyChart: YES      ADDITIONAL NOTE: LAST SEEN 12/26/23

## 2024-12-10 ENCOUNTER — OFFICE VISIT (OUTPATIENT)
Dept: ONCOLOGY | Facility: CLINIC | Age: 44
End: 2024-12-10
Payer: COMMERCIAL

## 2024-12-10 ENCOUNTER — LAB (OUTPATIENT)
Dept: LAB | Facility: HOSPITAL | Age: 44
End: 2024-12-10
Payer: COMMERCIAL

## 2024-12-10 VITALS
HEIGHT: 65 IN | OXYGEN SATURATION: 97 % | TEMPERATURE: 98.7 F | BODY MASS INDEX: 40.82 KG/M2 | SYSTOLIC BLOOD PRESSURE: 133 MMHG | HEART RATE: 92 BPM | WEIGHT: 245 LBS | DIASTOLIC BLOOD PRESSURE: 89 MMHG

## 2024-12-10 DIAGNOSIS — D50.9 IRON DEFICIENCY ANEMIA, UNSPECIFIED IRON DEFICIENCY ANEMIA TYPE: Primary | ICD-10-CM

## 2024-12-10 DIAGNOSIS — D50.9 IRON DEFICIENCY ANEMIA, UNSPECIFIED IRON DEFICIENCY ANEMIA TYPE: ICD-10-CM

## 2024-12-10 LAB
ALBUMIN SERPL-MCNC: 3.8 G/DL (ref 3.5–5.2)
ALBUMIN/GLOB SERPL: 1.2 G/DL
ALP SERPL-CCNC: 88 U/L (ref 39–117)
ALT SERPL W P-5'-P-CCNC: 62 U/L (ref 1–33)
ANION GAP SERPL CALCULATED.3IONS-SCNC: 9.9 MMOL/L (ref 5–15)
AST SERPL-CCNC: 91 U/L (ref 1–32)
BASOPHILS # BLD AUTO: 0.03 10*3/MM3 (ref 0–0.2)
BASOPHILS NFR BLD AUTO: 0.6 % (ref 0–1.5)
BILIRUB SERPL-MCNC: 0.2 MG/DL (ref 0–1.2)
BUN SERPL-MCNC: 7 MG/DL (ref 6–20)
BUN/CREAT SERPL: 10.6 (ref 7–25)
CALCIUM SPEC-SCNC: 8.9 MG/DL (ref 8.6–10.5)
CHLORIDE SERPL-SCNC: 103 MMOL/L (ref 98–107)
CO2 SERPL-SCNC: 25.1 MMOL/L (ref 22–29)
CREAT SERPL-MCNC: 0.66 MG/DL (ref 0.57–1)
DEPRECATED RDW RBC AUTO: 53.3 FL (ref 37–54)
EGFRCR SERPLBLD CKD-EPI 2021: 109.7 ML/MIN/1.73
EOSINOPHIL # BLD AUTO: 0.12 10*3/MM3 (ref 0–0.4)
EOSINOPHIL NFR BLD AUTO: 2.2 % (ref 0.3–6.2)
ERYTHROCYTE [DISTWIDTH] IN BLOOD BY AUTOMATED COUNT: 15.2 % (ref 12.3–15.4)
FERRITIN SERPL-MCNC: 107 NG/ML (ref 13–150)
FOLATE SERPL-MCNC: 3.74 NG/ML (ref 4.78–24.2)
GLOBULIN UR ELPH-MCNC: 3.2 GM/DL
GLUCOSE SERPL-MCNC: 76 MG/DL (ref 65–99)
HCT VFR BLD AUTO: 44.1 % (ref 34–46.6)
HGB BLD-MCNC: 14 G/DL (ref 12–15.9)
IRON 24H UR-MRATE: 30 MCG/DL (ref 37–145)
IRON SATN MFR SERPL: 6 % (ref 20–50)
LYMPHOCYTES # BLD AUTO: 1.53 10*3/MM3 (ref 0.7–3.1)
LYMPHOCYTES NFR BLD AUTO: 28.1 % (ref 19.6–45.3)
MCH RBC QN AUTO: 31 PG (ref 26.6–33)
MCHC RBC AUTO-ENTMCNC: 31.7 G/DL (ref 31.5–35.7)
MCV RBC AUTO: 97.6 FL (ref 79–97)
MONOCYTES # BLD AUTO: 0.62 10*3/MM3 (ref 0.1–0.9)
MONOCYTES NFR BLD AUTO: 11.4 % (ref 5–12)
NEUTROPHILS NFR BLD AUTO: 3.14 10*3/MM3 (ref 1.7–7)
NEUTROPHILS NFR BLD AUTO: 57.7 % (ref 42.7–76)
PLATELET # BLD AUTO: 254 10*3/MM3 (ref 140–450)
PMV BLD AUTO: 8.6 FL (ref 6–12)
POTASSIUM SERPL-SCNC: 3.7 MMOL/L (ref 3.5–5.2)
PROT SERPL-MCNC: 7 G/DL (ref 6–8.5)
RBC # BLD AUTO: 4.52 10*6/MM3 (ref 3.77–5.28)
SODIUM SERPL-SCNC: 138 MMOL/L (ref 136–145)
TIBC SERPL-MCNC: 471 MCG/DL (ref 298–536)
TRANSFERRIN SERPL-MCNC: 316 MG/DL (ref 200–360)
VIT B12 BLD-MCNC: 242 PG/ML (ref 211–946)
WBC NRBC COR # BLD AUTO: 5.44 10*3/MM3 (ref 3.4–10.8)

## 2024-12-10 PROCEDURE — 36415 COLL VENOUS BLD VENIPUNCTURE: CPT

## 2024-12-10 PROCEDURE — 82607 VITAMIN B-12: CPT | Performed by: PHYSICIAN ASSISTANT

## 2024-12-10 PROCEDURE — 83540 ASSAY OF IRON: CPT | Performed by: PHYSICIAN ASSISTANT

## 2024-12-10 PROCEDURE — 85025 COMPLETE CBC W/AUTO DIFF WBC: CPT

## 2024-12-10 PROCEDURE — 80053 COMPREHEN METABOLIC PANEL: CPT | Performed by: PHYSICIAN ASSISTANT

## 2024-12-10 PROCEDURE — 82746 ASSAY OF FOLIC ACID SERUM: CPT | Performed by: PHYSICIAN ASSISTANT

## 2024-12-10 PROCEDURE — 84466 ASSAY OF TRANSFERRIN: CPT | Performed by: PHYSICIAN ASSISTANT

## 2024-12-10 PROCEDURE — 82728 ASSAY OF FERRITIN: CPT | Performed by: PHYSICIAN ASSISTANT

## 2024-12-11 ENCOUNTER — TELEPHONE (OUTPATIENT)
Dept: ONCOLOGY | Facility: CLINIC | Age: 44
End: 2024-12-11
Payer: COMMERCIAL

## 2024-12-12 NOTE — TELEPHONE ENCOUNTER
Hub staff attempted to follow warm transfer process and was unsuccessful     Caller: Christy Castillo    Relationship to patient: Self    Best call back number: [phone]    Patient is needing: PT RETURNING MARY'S CALL TO SCHEDULE.  SHE IS AVAIL 12/17 AND 12/18 MORNINGS, 12/20 ANYTIME OR WEEK OF 12/23 ANYTIME.

## 2024-12-26 ENCOUNTER — HOSPITAL ENCOUNTER (OUTPATIENT)
Facility: HOSPITAL | Age: 44
Discharge: HOME | End: 2024-12-26
Admitting: OBSTETRICS & GYNECOLOGY
Payer: COMMERCIAL

## 2024-12-26 ENCOUNTER — OFFICE VISIT (OUTPATIENT)
Dept: OBSTETRICS AND GYNECOLOGY | Age: 44
End: 2024-12-26
Payer: COMMERCIAL

## 2024-12-26 VITALS
HEIGHT: 65 IN | DIASTOLIC BLOOD PRESSURE: 82 MMHG | WEIGHT: 203 LBS | SYSTOLIC BLOOD PRESSURE: 134 MMHG | BODY MASS INDEX: 33.82 KG/M2

## 2024-12-26 DIAGNOSIS — Z01.419 WELL FEMALE EXAM WITH ROUTINE GYNECOLOGICAL EXAM: Primary | ICD-10-CM

## 2024-12-26 DIAGNOSIS — Z12.31 SCREENING MAMMOGRAM FOR BREAST CANCER: ICD-10-CM

## 2024-12-26 PROCEDURE — 77067 SCR MAMMO BI INCL CAD: CPT

## 2024-12-26 PROCEDURE — 77063 BREAST TOMOSYNTHESIS BI: CPT

## 2024-12-26 NOTE — PROGRESS NOTES
Subjective     Chief Complaint   Patient presents with    Gynecologic Exam     AE Today, Last AE 2023 (-), HPV (-),  MG scheduled today       History of Present Illness    Christy Castillo is a 44 y.o.  who presents for annual exam. Iron infusion due gastric bypass. Started paxil and it has helped.  Patient had a Mirena IUD placed last year to help with bleeding.  At first she had some irregularity but now she rarely has any spotting and she is very happy with it.  She wishes to continue.  Her and her  joined a gym but she does not go very often.  She is hoping to start doing more gym visits and hiking after the start of the year.  Her menses are rare, lasting 0-3 days, dysmenorrhea none   Obstetric History:  OB History          3    Para   3    Term   3            AB        Living   2         SAB        IAB        Ectopic        Molar        Multiple        Live Births   2               Menstrual History:     No LMP recorded. Patient has had an implant.         Current contraception: IUD  Mirena 2023  bleeding control  vas  History of abnormal Pap smear: yes - age 16 colp normal  LPS  normal   Received Gardasil immunization: no  Perform regular self breast exam : yes  Family history of uterine or ovarian cancer: no  Family History of colon cancer: yes - PGF 47   Family history of breast cancer: no    Mammogram: done today.  Colonoscopy: recommended. Cologuard     DEXA: not indicated.    Exercise: not active plans  Calcium/Vitamin D: inadequate intake    The following portions of the patient's history were reviewed and updated as appropriate: allergies, current medications, past family history, past medical history, past social history, past surgical history, and problem list.    Review of Systems       Genitourinary: Negative for dysuria.   Neurological: Negative for headaches.   Psychiatric/Behavioral: Negative for dysphoric mood.     Objective   Physical Exam    BP  "134/82   Ht 165.1 cm (65\")   Wt 92.1 kg (203 lb)   BMI 33.78 kg/m²     General:   alert, appears stated age and cooperative   Neck: thyroid normal to palpation   Heart: regular rate and rhythm   Lungs: clear to auscultation bilaterally   Abdomen: soft, non-tender, without masses or organomegaly   Breast: inspection negative, no nipple discharge or bleeding, no masses or nodularity palpable   Vulva: normal, Bartholin's, Urethra, Birdseye's normal   Vagina: normal mucosa, normal discharge   Cervix: no cervical motion tenderness and no lesions   Uterus: non-tender, normal shape and consistency   Adnexa: no mass, fullness, tenderness   Rectal: not indicated     Assessment & Plan   Diagnoses and all orders for this visit:    1. Well female exam with routine gynecological exam (Primary)    2. Screening mammogram for breast cancer  -     Mammo Screening Digital Tomosynthesis Bilateral With CAD; Future    Discussed limitations of Cologuard  Continue yearly mammograms  Pap smear is up-to-date  Continue IUD for cycle control.    All questions answered.  Breast self exam technique reviewed and patient encouraged to perform self-exam monthly.  Discussed healthy lifestyle modifications.  Recommended 30 minutes of aerobic exercise five times per week.  Discussed calcium needs to prevent osteoporosis.                     "

## 2024-12-27 ENCOUNTER — HOSPITAL ENCOUNTER (OUTPATIENT)
Dept: ONCOLOGY | Facility: HOSPITAL | Age: 44
Discharge: HOME | End: 2024-12-27
Admitting: PHYSICIAN ASSISTANT
Payer: COMMERCIAL

## 2024-12-27 VITALS
HEART RATE: 62 BPM | WEIGHT: 208.8 LBS | TEMPERATURE: 98 F | RESPIRATION RATE: 16 BRPM | DIASTOLIC BLOOD PRESSURE: 87 MMHG | OXYGEN SATURATION: 99 % | SYSTOLIC BLOOD PRESSURE: 141 MMHG | HEIGHT: 65 IN | BODY MASS INDEX: 34.79 KG/M2

## 2024-12-27 DIAGNOSIS — D50.9 IRON DEFICIENCY ANEMIA, UNSPECIFIED IRON DEFICIENCY ANEMIA TYPE: ICD-10-CM

## 2024-12-27 DIAGNOSIS — K90.9 MALABSORPTION OF IRON: Primary | ICD-10-CM

## 2024-12-27 PROCEDURE — 96365 THER/PROPH/DIAG IV INF INIT: CPT

## 2024-12-27 PROCEDURE — 25010000002 IRON SUCROSE PER 1 MG: Performed by: PHYSICIAN ASSISTANT

## 2024-12-27 PROCEDURE — 25810000003 SODIUM CHLORIDE 0.9 % SOLUTION 250 ML FLEX CONT: Performed by: PHYSICIAN ASSISTANT

## 2024-12-27 PROCEDURE — 96366 THER/PROPH/DIAG IV INF ADDON: CPT

## 2024-12-27 RX ORDER — SODIUM CHLORIDE 9 MG/ML
20 INJECTION, SOLUTION INTRAVENOUS ONCE
Status: DISCONTINUED | OUTPATIENT
Start: 2024-12-27 | End: 2024-12-28 | Stop reason: HOSPADM

## 2024-12-27 RX ADMIN — SODIUM CHLORIDE 300 MG: 900 INJECTION, SOLUTION INTRAVENOUS at 10:20

## 2024-12-27 NOTE — PROGRESS NOTES
Patient opted not to stay the 30 min observation time. Patient discharged after PIV removed and VS WDL.

## 2025-01-03 ENCOUNTER — HOSPITAL ENCOUNTER (OUTPATIENT)
Dept: ONCOLOGY | Facility: HOSPITAL | Age: 45
Discharge: HOME | End: 2025-01-03
Payer: COMMERCIAL

## 2025-01-03 VITALS
DIASTOLIC BLOOD PRESSURE: 91 MMHG | RESPIRATION RATE: 16 BRPM | TEMPERATURE: 98.4 F | WEIGHT: 206 LBS | HEART RATE: 64 BPM | SYSTOLIC BLOOD PRESSURE: 151 MMHG | HEIGHT: 65 IN | BODY MASS INDEX: 34.32 KG/M2 | OXYGEN SATURATION: 98 %

## 2025-01-03 DIAGNOSIS — D50.9 IRON DEFICIENCY ANEMIA, UNSPECIFIED IRON DEFICIENCY ANEMIA TYPE: ICD-10-CM

## 2025-01-03 DIAGNOSIS — K90.9 MALABSORPTION OF IRON: Primary | ICD-10-CM

## 2025-01-03 PROCEDURE — 25810000003 SODIUM CHLORIDE 0.9 % SOLUTION 250 ML FLEX CONT: Performed by: PHYSICIAN ASSISTANT

## 2025-01-03 PROCEDURE — 25010000002 IRON SUCROSE PER 1 MG: Performed by: PHYSICIAN ASSISTANT

## 2025-01-03 PROCEDURE — 96366 THER/PROPH/DIAG IV INF ADDON: CPT

## 2025-01-03 PROCEDURE — 96365 THER/PROPH/DIAG IV INF INIT: CPT

## 2025-01-03 RX ORDER — SODIUM CHLORIDE 9 MG/ML
20 INJECTION, SOLUTION INTRAVENOUS ONCE
Status: DISCONTINUED | OUTPATIENT
Start: 2025-01-03 | End: 2025-01-04 | Stop reason: HOSPADM

## 2025-01-03 RX ADMIN — IRON SUCROSE 300 MG: 20 INJECTION, SOLUTION INTRAVENOUS at 13:53

## 2025-01-03 NOTE — PROGRESS NOTES
Patient here for her iron infusion. Tolerated well. VSS at beginning and end of infusion. She did not want to stay for 30min observation as she has no reactions in the past.

## 2025-01-10 ENCOUNTER — HOSPITAL ENCOUNTER (OUTPATIENT)
Dept: ONCOLOGY | Facility: HOSPITAL | Age: 45
Discharge: HOME | End: 2025-01-10
Admitting: PHYSICIAN ASSISTANT
Payer: COMMERCIAL

## 2025-01-10 VITALS
HEART RATE: 74 BPM | RESPIRATION RATE: 16 BRPM | WEIGHT: 207 LBS | OXYGEN SATURATION: 97 % | HEIGHT: 65 IN | BODY MASS INDEX: 34.49 KG/M2 | TEMPERATURE: 98.1 F | SYSTOLIC BLOOD PRESSURE: 124 MMHG | DIASTOLIC BLOOD PRESSURE: 64 MMHG

## 2025-01-10 DIAGNOSIS — D50.9 IRON DEFICIENCY ANEMIA, UNSPECIFIED IRON DEFICIENCY ANEMIA TYPE: ICD-10-CM

## 2025-01-10 DIAGNOSIS — K90.9 MALABSORPTION OF IRON: Primary | ICD-10-CM

## 2025-01-10 PROCEDURE — 25810000003 SODIUM CHLORIDE 0.9 % SOLUTION: Performed by: PHYSICIAN ASSISTANT

## 2025-01-10 PROCEDURE — 25010000002 IRON SUCROSE PER 1 MG: Performed by: PHYSICIAN ASSISTANT

## 2025-01-10 PROCEDURE — 96365 THER/PROPH/DIAG IV INF INIT: CPT

## 2025-01-10 PROCEDURE — 96366 THER/PROPH/DIAG IV INF ADDON: CPT

## 2025-01-10 RX ORDER — SODIUM CHLORIDE 9 MG/ML
20 INJECTION, SOLUTION INTRAVENOUS ONCE
Status: DISCONTINUED | OUTPATIENT
Start: 2025-01-10 | End: 2025-01-11 | Stop reason: HOSPADM

## 2025-01-10 RX ADMIN — IRON SUCROSE 300 MG: 20 INJECTION, SOLUTION INTRAVENOUS at 07:54

## 2025-01-30 DIAGNOSIS — K21.9 GASTROESOPHAGEAL REFLUX DISEASE, UNSPECIFIED WHETHER ESOPHAGITIS PRESENT: ICD-10-CM

## 2025-01-30 DIAGNOSIS — E53.8 VITAMIN B12 DEFICIENCY: ICD-10-CM

## 2025-01-30 RX ORDER — CYANOCOBALAMIN 1000 UG/ML
1000 INJECTION, SOLUTION INTRAMUSCULAR; SUBCUTANEOUS
Qty: 1 ML | Refills: 1 | OUTPATIENT
Start: 2025-01-30

## 2025-01-30 RX ORDER — CYANOCOBALAMIN 1000 UG/ML
1000 INJECTION, SOLUTION INTRAMUSCULAR; SUBCUTANEOUS
Qty: 1 ML | Refills: 1 | Status: CANCELLED | OUTPATIENT
Start: 2025-01-30

## 2025-01-30 RX ORDER — PAROXETINE 10 MG/1
10 TABLET, FILM COATED ORAL EVERY MORNING
Qty: 90 TABLET | Refills: 0 | Status: SHIPPED | OUTPATIENT
Start: 2025-01-30

## 2025-01-30 NOTE — TELEPHONE ENCOUNTER
Rx Refill Note  Requested Prescriptions     Pending Prescriptions Disp Refills    cyanocobalamin 1000 MCG/ML injection 1 mL 1     Sig: Inject 1 mL into the appropriate muscle as directed by prescriber Every 30 (Thirty) Days. 1st of the month      Last office visit with prescribing clinician: 8/5/2024   Last telemedicine visit with prescribing clinician: Visit date not found   Next office visit with prescribing clinician: 1/30/2025                         Would you like a call back once the refill request has been completed: [] Yes [] No    If the office needs to give you a call back, can they leave a voicemail: [] Yes [] No    Kareen Newton MA  01/30/25, 11:21 EST

## 2025-04-22 DIAGNOSIS — I10 PRIMARY HYPERTENSION: ICD-10-CM

## 2025-04-22 DIAGNOSIS — Z76.0 MEDICATION REFILL: ICD-10-CM

## 2025-04-22 RX ORDER — LISINOPRIL 5 MG/1
5 TABLET ORAL DAILY
Qty: 90 TABLET | Refills: 0 | Status: SHIPPED | OUTPATIENT
Start: 2025-04-22

## 2025-07-22 DIAGNOSIS — Z76.0 MEDICATION REFILL: ICD-10-CM

## 2025-07-22 DIAGNOSIS — I10 PRIMARY HYPERTENSION: ICD-10-CM

## 2025-07-22 RX ORDER — PAROXETINE 10 MG/1
10 TABLET, FILM COATED ORAL EVERY MORNING
Qty: 90 TABLET | Refills: 0 | Status: SHIPPED | OUTPATIENT
Start: 2025-07-22

## 2025-07-22 RX ORDER — LISINOPRIL 5 MG/1
5 TABLET ORAL DAILY
Qty: 90 TABLET | Refills: 0 | Status: SHIPPED | OUTPATIENT
Start: 2025-07-22